# Patient Record
Sex: FEMALE | Race: WHITE | NOT HISPANIC OR LATINO | Employment: OTHER | URBAN - METROPOLITAN AREA
[De-identification: names, ages, dates, MRNs, and addresses within clinical notes are randomized per-mention and may not be internally consistent; named-entity substitution may affect disease eponyms.]

---

## 2019-02-26 ENCOUNTER — LAB REQUISITION (OUTPATIENT)
Dept: LAB | Facility: HOSPITAL | Age: 84
End: 2019-02-26

## 2019-02-26 DIAGNOSIS — Z00.00 ROUTINE GENERAL MEDICAL EXAMINATION AT A HEALTH CARE FACILITY: ICD-10-CM

## 2019-02-26 LAB
ANION GAP SERPL CALCULATED.3IONS-SCNC: 8 MMOL/L (ref 3–11)
BACTERIA UR QL AUTO: ABNORMAL /HPF
BASOPHILS # BLD AUTO: 0.01 10*3/MM3 (ref 0–0.2)
BASOPHILS NFR BLD AUTO: 0.1 % (ref 0–1)
BILIRUB UR QL STRIP: NEGATIVE
BUN BLD-MCNC: 33 MG/DL (ref 9–23)
BUN/CREAT SERPL: 44.6 (ref 7–25)
CALCIUM SPEC-SCNC: 8.9 MG/DL (ref 8.7–10.4)
CHLORIDE SERPL-SCNC: 97 MMOL/L (ref 99–109)
CLARITY UR: CLEAR
CO2 SERPL-SCNC: 26 MMOL/L (ref 20–31)
COLOR UR: YELLOW
CREAT BLD-MCNC: 0.74 MG/DL (ref 0.6–1.3)
DEPRECATED RDW RBC AUTO: 43.4 FL (ref 37–54)
EOSINOPHIL # BLD AUTO: 0.03 10*3/MM3 (ref 0–0.3)
EOSINOPHIL NFR BLD AUTO: 0.2 % (ref 0–3)
ERYTHROCYTE [DISTWIDTH] IN BLOOD BY AUTOMATED COUNT: 12.8 % (ref 11.3–14.5)
GFR SERPL CREATININE-BSD FRML MDRD: 73 ML/MIN/1.73
GFR SERPL CREATININE-BSD FRML MDRD: 89 ML/MIN/1.73
GLUCOSE BLD-MCNC: 135 MG/DL (ref 70–100)
GLUCOSE UR STRIP-MCNC: NEGATIVE MG/DL
HCT VFR BLD AUTO: 38.8 % (ref 34.5–44)
HGB BLD-MCNC: 12.7 G/DL (ref 11.5–15.5)
HGB UR QL STRIP.AUTO: NEGATIVE
HYALINE CASTS UR QL AUTO: ABNORMAL /LPF
IMM GRANULOCYTES # BLD AUTO: 0.08 10*3/MM3 (ref 0–0.05)
IMM GRANULOCYTES NFR BLD AUTO: 0.4 % (ref 0–0.6)
KETONES UR QL STRIP: NEGATIVE
LEUKOCYTE ESTERASE UR QL STRIP.AUTO: ABNORMAL
LYMPHOCYTES # BLD AUTO: 0.89 10*3/MM3 (ref 0.6–4.8)
LYMPHOCYTES NFR BLD AUTO: 4.5 % (ref 24–44)
MCH RBC QN AUTO: 30.5 PG (ref 27–31)
MCHC RBC AUTO-ENTMCNC: 32.7 G/DL (ref 32–36)
MCV RBC AUTO: 93.3 FL (ref 80–99)
MONOCYTES # BLD AUTO: 1.19 10*3/MM3 (ref 0–1)
MONOCYTES NFR BLD AUTO: 6 % (ref 0–12)
NEUTROPHILS # BLD AUTO: 17.55 10*3/MM3 (ref 1.5–8.3)
NEUTROPHILS NFR BLD AUTO: 89.2 % (ref 41–71)
NITRITE UR QL STRIP: NEGATIVE
PH UR STRIP.AUTO: 7 [PH] (ref 5–8)
PLATELET # BLD AUTO: 239 10*3/MM3 (ref 150–450)
PMV BLD AUTO: 9.8 FL (ref 6–12)
POTASSIUM BLD-SCNC: 3.8 MMOL/L (ref 3.5–5.5)
PROT UR QL STRIP: ABNORMAL
RBC # BLD AUTO: 4.16 10*6/MM3 (ref 3.89–5.14)
RBC # UR: ABNORMAL /HPF
REF LAB TEST METHOD: ABNORMAL
SODIUM BLD-SCNC: 131 MMOL/L (ref 132–146)
SP GR UR STRIP: 1.01 (ref 1–1.03)
SQUAMOUS #/AREA URNS HPF: ABNORMAL /HPF
UROBILINOGEN UR QL STRIP: ABNORMAL
WBC NRBC COR # BLD: 19.67 10*3/MM3 (ref 3.5–10.8)
WBC UR QL AUTO: ABNORMAL /HPF
YEAST URNS QL MICRO: ABNORMAL /HPF

## 2019-02-26 PROCEDURE — 85025 COMPLETE CBC W/AUTO DIFF WBC: CPT

## 2019-02-26 PROCEDURE — 81001 URINALYSIS AUTO W/SCOPE: CPT

## 2019-02-26 PROCEDURE — 80048 BASIC METABOLIC PNL TOTAL CA: CPT

## 2019-02-27 ENCOUNTER — HOSPITAL ENCOUNTER (EMERGENCY)
Facility: HOSPITAL | Age: 84
Discharge: NURSING, REHAB OR LONG-TERM CARE | End: 2019-02-27
Attending: EMERGENCY MEDICINE | Admitting: NURSE PRACTITIONER

## 2019-02-27 ENCOUNTER — APPOINTMENT (OUTPATIENT)
Dept: GENERAL RADIOLOGY | Facility: HOSPITAL | Age: 84
End: 2019-02-27

## 2019-02-27 VITALS
DIASTOLIC BLOOD PRESSURE: 78 MMHG | HEART RATE: 77 BPM | TEMPERATURE: 99.1 F | HEIGHT: 65 IN | RESPIRATION RATE: 16 BRPM | SYSTOLIC BLOOD PRESSURE: 171 MMHG | BODY MASS INDEX: 19.99 KG/M2 | WEIGHT: 120 LBS | OXYGEN SATURATION: 91 %

## 2019-02-27 DIAGNOSIS — R11.0 NAUSEA: ICD-10-CM

## 2019-02-27 DIAGNOSIS — N30.00 ACUTE CYSTITIS WITHOUT HEMATURIA: Primary | ICD-10-CM

## 2019-02-27 LAB
ALBUMIN SERPL-MCNC: 3.5 G/DL (ref 3.2–4.8)
ALBUMIN/GLOB SERPL: 1.1 G/DL (ref 1.5–2.5)
ALP SERPL-CCNC: 93 U/L (ref 25–100)
ALT SERPL W P-5'-P-CCNC: 27 U/L (ref 7–40)
ANION GAP SERPL CALCULATED.3IONS-SCNC: 6 MMOL/L (ref 3–11)
AST SERPL-CCNC: 22 U/L (ref 0–33)
BACTERIA UR QL AUTO: ABNORMAL /HPF
BASOPHILS # BLD AUTO: 0.01 10*3/MM3 (ref 0–0.2)
BASOPHILS NFR BLD AUTO: 0.1 % (ref 0–1)
BILIRUB SERPL-MCNC: 0.5 MG/DL (ref 0.3–1.2)
BILIRUB UR QL STRIP: NEGATIVE
BUN BLD-MCNC: 25 MG/DL (ref 9–23)
BUN/CREAT SERPL: 35.2 (ref 7–25)
CALCIUM SPEC-SCNC: 9.1 MG/DL (ref 8.7–10.4)
CHLORIDE SERPL-SCNC: 99 MMOL/L (ref 99–109)
CLARITY UR: ABNORMAL
CO2 SERPL-SCNC: 27 MMOL/L (ref 20–31)
COLOR UR: YELLOW
CREAT BLD-MCNC: 0.71 MG/DL (ref 0.6–1.3)
D-LACTATE SERPL-SCNC: 1.2 MMOL/L (ref 0.5–2)
DEPRECATED RDW RBC AUTO: 42.9 FL (ref 37–54)
EOSINOPHIL # BLD AUTO: 0.06 10*3/MM3 (ref 0–0.3)
EOSINOPHIL NFR BLD AUTO: 0.4 % (ref 0–3)
ERYTHROCYTE [DISTWIDTH] IN BLOOD BY AUTOMATED COUNT: 12.7 % (ref 11.3–14.5)
GFR SERPL CREATININE-BSD FRML MDRD: 77 ML/MIN/1.73
GLOBULIN UR ELPH-MCNC: 3.1 GM/DL
GLUCOSE BLD-MCNC: 120 MG/DL (ref 70–100)
GLUCOSE UR STRIP-MCNC: NEGATIVE MG/DL
HCT VFR BLD AUTO: 35.9 % (ref 34.5–44)
HGB BLD-MCNC: 11.8 G/DL (ref 11.5–15.5)
HGB UR QL STRIP.AUTO: NEGATIVE
HOLD SPECIMEN: NORMAL
HOLD SPECIMEN: NORMAL
HYALINE CASTS UR QL AUTO: ABNORMAL /LPF
IMM GRANULOCYTES # BLD AUTO: 0.06 10*3/MM3 (ref 0–0.05)
IMM GRANULOCYTES NFR BLD AUTO: 0.4 % (ref 0–0.6)
KETONES UR QL STRIP: NEGATIVE
LEUKOCYTE ESTERASE UR QL STRIP.AUTO: ABNORMAL
LIPASE SERPL-CCNC: 39 U/L (ref 6–51)
LYMPHOCYTES # BLD AUTO: 0.83 10*3/MM3 (ref 0.6–4.8)
LYMPHOCYTES NFR BLD AUTO: 5 % (ref 24–44)
MCH RBC QN AUTO: 30.6 PG (ref 27–31)
MCHC RBC AUTO-ENTMCNC: 32.9 G/DL (ref 32–36)
MCV RBC AUTO: 93 FL (ref 80–99)
MONOCYTES # BLD AUTO: 1.15 10*3/MM3 (ref 0–1)
MONOCYTES NFR BLD AUTO: 7 % (ref 0–12)
NEUTROPHILS # BLD AUTO: 14.44 10*3/MM3 (ref 1.5–8.3)
NEUTROPHILS NFR BLD AUTO: 87.5 % (ref 41–71)
NITRITE UR QL STRIP: NEGATIVE
PH UR STRIP.AUTO: 7 [PH] (ref 5–8)
PLATELET # BLD AUTO: 240 10*3/MM3 (ref 150–450)
PMV BLD AUTO: 9.3 FL (ref 6–12)
POTASSIUM BLD-SCNC: 3.4 MMOL/L (ref 3.5–5.5)
PROT SERPL-MCNC: 6.6 G/DL (ref 5.7–8.2)
PROT UR QL STRIP: ABNORMAL
RBC # BLD AUTO: 3.86 10*6/MM3 (ref 3.89–5.14)
RBC # UR: ABNORMAL /HPF
REF LAB TEST METHOD: ABNORMAL
SODIUM BLD-SCNC: 132 MMOL/L (ref 132–146)
SP GR UR STRIP: 1.02 (ref 1–1.03)
SQUAMOUS #/AREA URNS HPF: ABNORMAL /HPF
UROBILINOGEN UR QL STRIP: ABNORMAL
WBC NRBC COR # BLD: 16.49 10*3/MM3 (ref 3.5–10.8)
WBC UR QL AUTO: ABNORMAL /HPF
WHOLE BLOOD HOLD SPECIMEN: NORMAL
WHOLE BLOOD HOLD SPECIMEN: NORMAL

## 2019-02-27 PROCEDURE — 83690 ASSAY OF LIPASE: CPT | Performed by: EMERGENCY MEDICINE

## 2019-02-27 PROCEDURE — 87086 URINE CULTURE/COLONY COUNT: CPT | Performed by: NURSE PRACTITIONER

## 2019-02-27 PROCEDURE — 80053 COMPREHEN METABOLIC PANEL: CPT | Performed by: EMERGENCY MEDICINE

## 2019-02-27 PROCEDURE — 85025 COMPLETE CBC W/AUTO DIFF WBC: CPT | Performed by: EMERGENCY MEDICINE

## 2019-02-27 PROCEDURE — 81001 URINALYSIS AUTO W/SCOPE: CPT | Performed by: EMERGENCY MEDICINE

## 2019-02-27 PROCEDURE — 25010000002 CEFTRIAXONE PER 250 MG: Performed by: NURSE PRACTITIONER

## 2019-02-27 PROCEDURE — 99284 EMERGENCY DEPT VISIT MOD MDM: CPT

## 2019-02-27 PROCEDURE — 96365 THER/PROPH/DIAG IV INF INIT: CPT

## 2019-02-27 PROCEDURE — 87147 CULTURE TYPE IMMUNOLOGIC: CPT | Performed by: NURSE PRACTITIONER

## 2019-02-27 PROCEDURE — 83605 ASSAY OF LACTIC ACID: CPT | Performed by: EMERGENCY MEDICINE

## 2019-02-27 PROCEDURE — 96361 HYDRATE IV INFUSION ADD-ON: CPT

## 2019-02-27 PROCEDURE — 71045 X-RAY EXAM CHEST 1 VIEW: CPT

## 2019-02-27 RX ORDER — ACETAMINOPHEN 325 MG/1
650 TABLET ORAL 2 TIMES DAILY
COMMUNITY

## 2019-02-27 RX ORDER — HYDROCHLOROTHIAZIDE 12.5 MG/1
12.5 TABLET ORAL DAILY
COMMUNITY
End: 2019-04-11 | Stop reason: HOSPADM

## 2019-02-27 RX ORDER — GABAPENTIN 100 MG/1
100 CAPSULE ORAL 2 TIMES DAILY
Status: ON HOLD | COMMUNITY
End: 2019-04-11 | Stop reason: SDUPTHER

## 2019-02-27 RX ORDER — ONDANSETRON 4 MG/1
4 TABLET, FILM COATED ORAL EVERY 6 HOURS PRN
COMMUNITY

## 2019-02-27 RX ORDER — PROMETHAZINE HYDROCHLORIDE 25 MG/1
25 SUPPOSITORY RECTAL EVERY 6 HOURS PRN
COMMUNITY

## 2019-02-27 RX ORDER — RANITIDINE 150 MG/1
150 TABLET ORAL DAILY
COMMUNITY

## 2019-02-27 RX ORDER — SODIUM CHLORIDE 0.9 % (FLUSH) 0.9 %
10 SYRINGE (ML) INJECTION AS NEEDED
Status: DISCONTINUED | OUTPATIENT
Start: 2019-02-27 | End: 2019-02-27 | Stop reason: HOSPADM

## 2019-02-27 RX ORDER — CEFDINIR 300 MG/1
300 CAPSULE ORAL 2 TIMES DAILY
Qty: 20 CAPSULE | Refills: 0 | Status: ON HOLD | OUTPATIENT
Start: 2019-02-27 | End: 2019-04-05

## 2019-02-27 RX ORDER — DOCUSATE SODIUM 100 MG/1
100 CAPSULE, LIQUID FILLED ORAL 2 TIMES DAILY
COMMUNITY

## 2019-02-27 RX ORDER — ALENDRONATE SODIUM 70 MG/1
70 TABLET ORAL
COMMUNITY

## 2019-02-27 RX ORDER — LORATADINE 10 MG/1
10 TABLET ORAL DAILY
COMMUNITY

## 2019-02-27 RX ORDER — ASPIRIN 81 MG/1
81 TABLET ORAL DAILY
COMMUNITY

## 2019-02-27 RX ORDER — CALCIUM CARBONATE 500(1250)
1000 TABLET ORAL DAILY
Status: ON HOLD | COMMUNITY
End: 2019-04-05

## 2019-02-27 RX ORDER — ROPINIROLE 0.5 MG/1
0.5 TABLET, FILM COATED ORAL NIGHTLY
COMMUNITY

## 2019-02-27 RX ORDER — AMLODIPINE BESYLATE 5 MG/1
5 TABLET ORAL 2 TIMES DAILY
COMMUNITY
End: 2019-04-11 | Stop reason: HOSPADM

## 2019-02-27 RX ORDER — VENLAFAXINE 37.5 MG/1
37.5 TABLET ORAL 2 TIMES DAILY
COMMUNITY

## 2019-02-27 RX ORDER — POTASSIUM CHLORIDE 1500 MG/1
20 TABLET, FILM COATED, EXTENDED RELEASE ORAL DAILY
COMMUNITY
End: 2019-04-11 | Stop reason: HOSPADM

## 2019-02-27 RX ORDER — CEFTRIAXONE SODIUM 1 G/50ML
1 INJECTION, SOLUTION INTRAVENOUS ONCE
Status: COMPLETED | OUTPATIENT
Start: 2019-02-27 | End: 2019-02-27

## 2019-02-27 RX ADMIN — CEFTRIAXONE SODIUM 1 G: 1 INJECTION, SOLUTION INTRAVENOUS at 14:07

## 2019-02-27 RX ADMIN — SODIUM CHLORIDE 1000 ML: 9 INJECTION, SOLUTION INTRAVENOUS at 12:56

## 2019-03-01 LAB
BACTERIA SPEC AEROBE CULT: ABNORMAL
BACTERIA SPEC AEROBE CULT: ABNORMAL
STREP GROUPING: ABNORMAL

## 2019-04-05 ENCOUNTER — HOSPITAL ENCOUNTER (INPATIENT)
Facility: HOSPITAL | Age: 84
LOS: 6 days | Discharge: NURSING, REHAB OR LONG-TERM CARE | End: 2019-04-11
Attending: EMERGENCY MEDICINE | Admitting: INTERNAL MEDICINE

## 2019-04-05 ENCOUNTER — APPOINTMENT (OUTPATIENT)
Dept: GENERAL RADIOLOGY | Facility: HOSPITAL | Age: 84
End: 2019-04-05

## 2019-04-05 DIAGNOSIS — R41.82 ALTERED MENTAL STATUS, UNSPECIFIED ALTERED MENTAL STATUS TYPE: ICD-10-CM

## 2019-04-05 DIAGNOSIS — R74.02 ELEVATED SERUM LACTATE DEHYDROGENASE: ICD-10-CM

## 2019-04-05 DIAGNOSIS — R13.12 OROPHARYNGEAL DYSPHAGIA: ICD-10-CM

## 2019-04-05 DIAGNOSIS — Z78.9 IMPAIRED MOBILITY AND ADLS: ICD-10-CM

## 2019-04-05 DIAGNOSIS — Z74.09 IMPAIRED FUNCTIONAL MOBILITY, BALANCE, GAIT, AND ENDURANCE: ICD-10-CM

## 2019-04-05 DIAGNOSIS — N28.9 RENAL INSUFFICIENCY: ICD-10-CM

## 2019-04-05 DIAGNOSIS — A41.9 SEPSIS DUE TO URINARY TRACT INFECTION (HCC): Primary | ICD-10-CM

## 2019-04-05 DIAGNOSIS — N39.0 SEPSIS DUE TO URINARY TRACT INFECTION (HCC): Primary | ICD-10-CM

## 2019-04-05 DIAGNOSIS — E87.3 RESPIRATORY ALKALOSIS: ICD-10-CM

## 2019-04-05 DIAGNOSIS — Z74.09 IMPAIRED MOBILITY AND ADLS: ICD-10-CM

## 2019-04-05 DIAGNOSIS — E86.0 DEHYDRATION: ICD-10-CM

## 2019-04-05 PROBLEM — R74.01 TRANSAMINITIS: Status: ACTIVE | Noted: 2019-04-05

## 2019-04-05 PROBLEM — I10 HYPERTENSION: Status: ACTIVE | Noted: 2019-04-05

## 2019-04-05 PROBLEM — E11.9 DIABETES MELLITUS (HCC): Status: ACTIVE | Noted: 2019-04-05

## 2019-04-05 PROBLEM — F32.A DEPRESSION: Status: ACTIVE | Noted: 2019-04-05

## 2019-04-05 PROBLEM — E87.20 LACTIC ACIDOSIS: Status: ACTIVE | Noted: 2019-04-05

## 2019-04-05 PROBLEM — N17.9 AKI (ACUTE KIDNEY INJURY) (HCC): Status: ACTIVE | Noted: 2019-04-05

## 2019-04-05 PROBLEM — F03.90 DEMENTIA (HCC): Status: ACTIVE | Noted: 2019-04-05

## 2019-04-05 LAB
ALBUMIN SERPL-MCNC: 4.41 G/DL (ref 3.2–4.8)
ALBUMIN/GLOB SERPL: 1 G/DL (ref 1.5–2.5)
ALP SERPL-CCNC: 134 U/L (ref 25–100)
ALT SERPL W P-5'-P-CCNC: 86 U/L (ref 7–40)
AMPHET+METHAMPHET UR QL: NEGATIVE
AMPHETAMINES UR QL: NEGATIVE
ANION GAP SERPL CALCULATED.3IONS-SCNC: 12 MMOL/L (ref 3–11)
ARTERIAL PATENCY WRIST A: ABNORMAL
AST SERPL-CCNC: 66 U/L (ref 0–33)
ATMOSPHERIC PRESS: ABNORMAL MMHG
BACTERIA UR QL AUTO: ABNORMAL /HPF
BARBITURATES UR QL SCN: NEGATIVE
BASE EXCESS BLDA CALC-SCNC: 2.5 MMOL/L (ref 0–2)
BASOPHILS # BLD AUTO: 0.02 10*3/MM3 (ref 0–0.2)
BASOPHILS NFR BLD AUTO: 0.2 % (ref 0–1)
BDY SITE: ABNORMAL
BENZODIAZ UR QL SCN: NEGATIVE
BILIRUB SERPL-MCNC: 0.5 MG/DL (ref 0.3–1.2)
BILIRUB UR QL STRIP: NEGATIVE
BODY TEMPERATURE: 37 C
BUN BLD-MCNC: 39 MG/DL (ref 9–23)
BUN/CREAT SERPL: 28.9 (ref 7–25)
BUPRENORPHINE SERPL-MCNC: NEGATIVE NG/ML
CALCIUM SPEC-SCNC: 10.8 MG/DL (ref 8.7–10.4)
CANNABINOIDS SERPL QL: NEGATIVE
CHLORIDE SERPL-SCNC: 111 MMOL/L (ref 99–109)
CLARITY UR: ABNORMAL
CO2 BLDA-SCNC: 25.1 MMOL/L (ref 23–27)
CO2 SERPL-SCNC: 24 MMOL/L (ref 20–31)
COCAINE UR QL: NEGATIVE
COHGB MFR BLD: 1.5 % (ref 0–2)
COLOR UR: ABNORMAL
CREAT BLD-MCNC: 1.35 MG/DL (ref 0.6–1.3)
D-LACTATE SERPL-SCNC: 1.6 MMOL/L (ref 0.5–2)
D-LACTATE SERPL-SCNC: 3.1 MMOL/L (ref 0.5–2)
DEPRECATED RDW RBC AUTO: 48.8 FL (ref 37–54)
EOSINOPHIL # BLD AUTO: 0 10*3/MM3 (ref 0–0.3)
EOSINOPHIL NFR BLD AUTO: 0 % (ref 0–3)
ERYTHROCYTE [DISTWIDTH] IN BLOOD BY AUTOMATED COUNT: 14.3 % (ref 11.3–14.5)
GFR SERPL CREATININE-BSD FRML MDRD: 37 ML/MIN/1.73
GLOBULIN UR ELPH-MCNC: 4.6 GM/DL
GLUCOSE BLD-MCNC: 177 MG/DL (ref 70–100)
GLUCOSE UR STRIP-MCNC: NEGATIVE MG/DL
HCO3 BLDA-SCNC: 24.2 MMOL/L (ref 20–26)
HCT VFR BLD AUTO: 51.8 % (ref 34.5–44)
HCT VFR BLD CALC: 46 %
HGB BLD-MCNC: 16.6 G/DL (ref 11.5–15.5)
HGB BLDA-MCNC: 15 G/DL (ref 14–18)
HGB UR QL STRIP.AUTO: ABNORMAL
HOLD SPECIMEN: NORMAL
HOROWITZ INDEX BLD+IHG-RTO: 21 %
HYALINE CASTS UR QL AUTO: ABNORMAL /LPF
IMM GRANULOCYTES # BLD AUTO: 0.04 10*3/MM3 (ref 0–0.05)
IMM GRANULOCYTES NFR BLD AUTO: 0.3 % (ref 0–0.6)
KETONES UR QL STRIP: ABNORMAL
LEUKOCYTE ESTERASE UR QL STRIP.AUTO: ABNORMAL
LYMPHOCYTES # BLD AUTO: 1.22 10*3/MM3 (ref 0.6–4.8)
LYMPHOCYTES NFR BLD AUTO: 10 % (ref 24–44)
MCH RBC QN AUTO: 30.4 PG (ref 27–31)
MCHC RBC AUTO-ENTMCNC: 32 G/DL (ref 32–36)
MCV RBC AUTO: 94.9 FL (ref 80–99)
METHADONE UR QL SCN: NEGATIVE
METHGB BLD QL: 0.7 % (ref 0–1.5)
MODALITY: ABNORMAL
MONOCYTES # BLD AUTO: 0.47 10*3/MM3 (ref 0–1)
MONOCYTES NFR BLD AUTO: 3.9 % (ref 0–12)
NEUTROPHILS # BLD AUTO: 10.49 10*3/MM3 (ref 1.5–8.3)
NEUTROPHILS NFR BLD AUTO: 85.9 % (ref 41–71)
NITRITE UR QL STRIP: NEGATIVE
NOTE: ABNORMAL
OPIATES UR QL: NEGATIVE
OXYCODONE UR QL SCN: NEGATIVE
OXYHGB MFR BLDV: 94.9 % (ref 94–99)
PCO2 BLDA: 29.2 MM HG (ref 35–45)
PCO2 TEMP ADJ BLD: 29.2 MM HG (ref 35–45)
PCP UR QL SCN: NEGATIVE
PH BLDA: 7.53 PH UNITS (ref 7.35–7.45)
PH UR STRIP.AUTO: 8 [PH] (ref 5–8)
PH, TEMP CORRECTED: 7.53 PH UNITS
PLATELET # BLD AUTO: 361 10*3/MM3 (ref 150–450)
PMV BLD AUTO: 9.9 FL (ref 6–12)
PO2 BLDA: 76.2 MM HG (ref 83–108)
PO2 TEMP ADJ BLD: 76.2 MM HG (ref 83–108)
POTASSIUM BLD-SCNC: 3.9 MMOL/L (ref 3.5–5.5)
PROPOXYPH UR QL: NEGATIVE
PROT SERPL-MCNC: 9 G/DL (ref 5.7–8.2)
PROT UR QL STRIP: ABNORMAL
RBC # BLD AUTO: 5.46 10*6/MM3 (ref 3.89–5.14)
RBC # UR: ABNORMAL /HPF
REF LAB TEST METHOD: ABNORMAL
SODIUM BLD-SCNC: 147 MMOL/L (ref 132–146)
SP GR UR STRIP: 1.02 (ref 1–1.03)
SQUAMOUS #/AREA URNS HPF: ABNORMAL /HPF
TRICYCLICS UR QL SCN: NEGATIVE
UROBILINOGEN UR QL STRIP: ABNORMAL
VENTILATOR MODE: ABNORMAL
WBC NRBC COR # BLD: 12.2 10*3/MM3 (ref 3.5–10.8)
WBC UR QL AUTO: ABNORMAL /HPF
WHOLE BLOOD HOLD SPECIMEN: NORMAL
WHOLE BLOOD HOLD SPECIMEN: NORMAL

## 2019-04-05 PROCEDURE — 81001 URINALYSIS AUTO W/SCOPE: CPT | Performed by: EMERGENCY MEDICINE

## 2019-04-05 PROCEDURE — 80053 COMPREHEN METABOLIC PANEL: CPT | Performed by: EMERGENCY MEDICINE

## 2019-04-05 PROCEDURE — 85025 COMPLETE CBC W/AUTO DIFF WBC: CPT | Performed by: EMERGENCY MEDICINE

## 2019-04-05 PROCEDURE — 25010000002 VANCOMYCIN PER 500 MG

## 2019-04-05 PROCEDURE — 87040 BLOOD CULTURE FOR BACTERIA: CPT | Performed by: EMERGENCY MEDICINE

## 2019-04-05 PROCEDURE — 80306 DRUG TEST PRSMV INSTRMNT: CPT | Performed by: EMERGENCY MEDICINE

## 2019-04-05 PROCEDURE — 93010 ELECTROCARDIOGRAM REPORT: CPT | Performed by: INTERNAL MEDICINE

## 2019-04-05 PROCEDURE — 71045 X-RAY EXAM CHEST 1 VIEW: CPT

## 2019-04-05 PROCEDURE — 83605 ASSAY OF LACTIC ACID: CPT | Performed by: EMERGENCY MEDICINE

## 2019-04-05 PROCEDURE — 87086 URINE CULTURE/COLONY COUNT: CPT | Performed by: EMERGENCY MEDICINE

## 2019-04-05 PROCEDURE — 25010000002 CEFEPIME 2 G/NS 100 ML SOLUTION: Performed by: INTERNAL MEDICINE

## 2019-04-05 PROCEDURE — 36600 WITHDRAWAL OF ARTERIAL BLOOD: CPT

## 2019-04-05 PROCEDURE — 25010000003 CEFTRIAXONE PER 250 MG: Performed by: EMERGENCY MEDICINE

## 2019-04-05 PROCEDURE — 99291 CRITICAL CARE FIRST HOUR: CPT

## 2019-04-05 PROCEDURE — 87077 CULTURE AEROBIC IDENTIFY: CPT | Performed by: EMERGENCY MEDICINE

## 2019-04-05 PROCEDURE — 93005 ELECTROCARDIOGRAM TRACING: CPT | Performed by: INTERNAL MEDICINE

## 2019-04-05 PROCEDURE — 99291 CRITICAL CARE FIRST HOUR: CPT | Performed by: INTERNAL MEDICINE

## 2019-04-05 PROCEDURE — 82805 BLOOD GASES W/O2 SATURATION: CPT

## 2019-04-05 PROCEDURE — 92610 EVALUATE SWALLOWING FUNCTION: CPT

## 2019-04-05 PROCEDURE — 25010000002 LINEZOLID 600 MG/300ML SOLUTION: Performed by: INTERNAL MEDICINE

## 2019-04-05 PROCEDURE — 87186 SC STD MICRODIL/AGAR DIL: CPT | Performed by: EMERGENCY MEDICINE

## 2019-04-05 RX ORDER — LINEZOLID 2 MG/ML
600 INJECTION, SOLUTION INTRAVENOUS EVERY 12 HOURS
Status: DISCONTINUED | OUTPATIENT
Start: 2019-04-05 | End: 2019-04-06

## 2019-04-05 RX ORDER — NYSTATIN 100000 [USP'U]/G
1 POWDER TOPICAL 2 TIMES DAILY
COMMUNITY

## 2019-04-05 RX ORDER — CEFTRIAXONE SODIUM 2 G/50ML
2 INJECTION, SOLUTION INTRAVENOUS ONCE
Status: COMPLETED | OUTPATIENT
Start: 2019-04-05 | End: 2019-04-05

## 2019-04-05 RX ORDER — SODIUM CHLORIDE 0.9 % (FLUSH) 0.9 %
10 SYRINGE (ML) INJECTION AS NEEDED
Status: DISCONTINUED | OUTPATIENT
Start: 2019-04-05 | End: 2019-04-11 | Stop reason: HOSPADM

## 2019-04-05 RX ORDER — SODIUM CHLORIDE 9 MG/ML
50 INJECTION, SOLUTION INTRAVENOUS CONTINUOUS
Status: DISCONTINUED | OUTPATIENT
Start: 2019-04-05 | End: 2019-04-11 | Stop reason: HOSPADM

## 2019-04-05 RX ORDER — VANCOMYCIN HYDROCHLORIDE 1 G/200ML
1000 INJECTION, SOLUTION INTRAVENOUS ONCE
Status: COMPLETED | OUTPATIENT
Start: 2019-04-05 | End: 2019-04-05

## 2019-04-05 RX ADMIN — CEFTRIAXONE SODIUM 2 G: 2 INJECTION, SOLUTION INTRAVENOUS at 10:06

## 2019-04-05 RX ADMIN — VANCOMYCIN HYDROCHLORIDE 1000 MG: 1 INJECTION, SOLUTION INTRAVENOUS at 11:39

## 2019-04-05 RX ADMIN — SODIUM CHLORIDE 125 ML/HR: 900 INJECTION INTRAVENOUS at 09:23

## 2019-04-05 RX ADMIN — NICARDIPINE HYDROCHLORIDE 5 MG/HR: 0.1 INJECTION, SOLUTION INTRAVENOUS at 13:22

## 2019-04-05 RX ADMIN — CEFEPIME 2 G: 2 INJECTION, POWDER, FOR SOLUTION INTRAVENOUS at 16:11

## 2019-04-05 RX ADMIN — LINEZOLID 600 MG: 600 INJECTION, SOLUTION INTRAVENOUS at 14:46

## 2019-04-05 RX ADMIN — SODIUM CHLORIDE 1632 ML: 9 INJECTION, SOLUTION INTRAVENOUS at 09:23

## 2019-04-05 NOTE — H&P
INTENSIVIST   INITIAL HOSPITAL VISIT NOTE     Hospital:  LOS: 0 days     Ms. Roro Guzman, 89 y.o. female is seen for a Chief Complaint of:  Chief Complaint   Patient presents with   • Altered Mental Status       Sepsis due to urinary tract infection (CMS/[facility])    Hypertension    Diabetes mellitus (CMS/[facility])    Depression    Dementia    TAMI (acute kidney injury) (CMS/[facility])    Lactic acidosis    Transaminitis      Subjective   S     Ms. Guzman is a 89yo F Nursing Home Resident with a history of dementia and recurrent UTI who presented to the ED from [facility] for evaluation of altered mental status. Her son and POA are at bedside. Her son notes that for the past 1 week she has been less responsive and has been unable to feed herself. She developed a fever last night and her mentation worsened prompting transfer to the ED.     Upon arrival to the ED, she was noted to be poorly responsive with periods of apnea. Urinalysis was indicative of a UTI and she was given 1 dose of Rocephin.     Her POA and son both note that over the past several months, she has become weaker and cannot get around unassisted which is a change for her.        PMH: She  has a past medical history of Anemia, Clostridium difficile infection, Dementia, Depression, Diabetes mellitus (CMS/[facility]), GERD (gastroesophageal reflux disease), Hemorrhoid, Hypertension, and PVD (peripheral vascular disease) (CMS/[facility]).   PSxH: She  has no past surgical history on file.      Medications:  No current facility-administered medications on file prior to encounter.      Current Outpatient Medications on File Prior to Encounter   Medication Sig   • acetaminophen (TYLENOL) 325 MG tablet Take 650 mg by mouth 2 (Two) Times a Day.   • alendronate (FOSAMAX) 70 MG tablet Take 70 mg by mouth Every 7 (Seven) Days.   • amLODIPine (NORVASC) 5 MG tablet Take 5 mg by mouth 2 (Two) Times a Day.   • Artificial Tear Solution (BION TEARS OP) Apply 1 drop to eye(s) as directed by  provider 2 (Two) Times a Day.   • aspirin 81 MG EC tablet Take 81 mg by mouth Daily.   • calcium carbonate, oyster shell, 500 MG tablet tablet Take 1,000 mg by mouth Daily.   • docusate sodium (COLACE) 100 MG capsule Take 100 mg by mouth 2 (Two) Times a Day.   • gabapentin (NEURONTIN) 100 MG capsule Take 100 mg by mouth 2 (Two) Times a Day.   • hydrochlorothiazide (HYDRODIURIL) 12.5 MG tablet Take 12.5 mg by mouth Daily.   • loratadine (CLARITIN) 10 MG tablet Take 10 mg by mouth Daily.   • ondansetron (ZOFRAN) 4 MG tablet Take 4 mg by mouth Every 6 (Six) Hours As Needed for Nausea or Vomiting.   • potassium chloride (K-DUR) 10 MEQ CR tablet Take 20 mEq by mouth Daily.   • promethazine (PHENERGAN) 25 MG suppository Insert 25 mg into the rectum Every 6 (Six) Hours As Needed for Nausea or Vomiting.   • raNITIdine (ZANTAC) 150 MG tablet Take 150 mg by mouth Daily.   • rOPINIRole (REQUIP) 0.5 MG tablet Take 0.5 mg by mouth Every Night. Take 1 hour before bedtime.   • venlafaxine (EFFEXOR) 37.5 MG tablet Take 37.5 mg by mouth 2 (Two) Times a Day.   • cefdinir (OMNICEF) 300 MG capsule Take 1 capsule by mouth 2 (Two) Times a Day.       Allergies: She is allergic to ace inhibitors; amoxicillin; and penicillins.   FH: Her family history is not on file.   SH: She  reports that she does not drink alcohol or use drugs.     The patient's relevant past medical, surgical and social history were reviewed and updated in Epic as appropriate.     ROS (14):   Review of Systems   Unable to perform ROS: Mental status change       Objective   O     Vitals    Temp  Min: 102.3 °F (39.1 °C)  Max: 102.3 °F (39.1 °C)  BP  Min: 145/108  Max: 220/107  Pulse  Min: 101  Max: 134  Resp  Min: 24  Max: 24  SpO2  Min: 92 %  Max: 96 % No Data Recorded     I/O 24 hrs (7:00AM - 6:59 AM)  Intake/Output       04/05/19 0700 - 04/06/19 0659    Intake (ml) 3314    Output (ml) --    Net (ml) 3314    Last Weight  54.4 kg (120 lb)          Medications  (drips):    niCARdipine    Pharmacy to dose vancomycin    sodium chloride Last Rate: 125 mL/hr (04/05/19 0923)       Physical Examination    Telemetry: Normal sinus rhythm.    Constitutional:  No acute distress.  Poorly responsive.    HEENT: Normocephalic and atraumatic.   Neck:  Normal range of motion. Neck supple.   No JVD present.   No thyromegaly.    Cardiovascular: Regular rate and rhythm.  No murmurs, rub or gallop.  No peripheral edema.   Respiratory: Normal respiratory effort.  Clear to ascultation.   Abdominal:  Soft. No masses.   Non-tender. No distension.   No hepatosplenomegaly.   MSK: Normal muscle strength and tone.   Extremities: No digital cyanosis or clubbing.   Skin: Skin is warm and dry.  No rashes, lesions or ulcers noted.    Neurological:   Minimal response to painful stimuli.    Psychiatric:  Unable to assess.             Results from last 7 days   Lab Units 04/05/19  0858   WBC 10*3/mm3 12.20*   HEMOGLOBIN g/dL 16.6*   MCV fL 94.9   PLATELETS 10*3/mm3 361     Results from last 7 days   Lab Units 04/05/19  0858   SODIUM mmol/L 147*   POTASSIUM mmol/L 3.9   CO2 mmol/L 24.0   CREATININE mg/dL 1.35*     Estimated Creatinine Clearance: 22.8 mL/min (A) (by C-G formula based on SCr of 1.35 mg/dL (H)).  Results from last 7 days   Lab Units 04/05/19  0858   ALK PHOS U/L 134*   BILIRUBIN mg/dL 0.5   ALT (SGPT) U/L 86*   AST (SGOT) U/L 66*       Results from last 7 days   Lab Units 04/05/19  1009   PH, ARTERIAL pH units 7.527*   PCO2, ARTERIAL mm Hg 29.2*   PO2 ART mm Hg 76.2*   FIO2 % 21       Images:    Imaging Results (last 24 hours)     Procedure Component Value Units Date/Time    XR Chest 1 View [[id number]] Collected:  04/05/19 1143     Updated:  04/05/19 1147    Narrative:       EXAMINATION: XR CHEST 1 VW-04/05/2019:      INDICATION: Pneumonia; A41.9-Sepsis, unspecified organism; N39.0-Urinary  tract infection, site not specified.      COMPARISON: 02/27/2019.     FINDINGS: The cardiac silhouette is  normal. There is a tortuous thoracic  aorta. There is no acute inflammatory process, mass or effusion.           Impression:       Chronic change. There are no acute findings.     D:  04/05/2019  E:  04/05/2019     This report was finalized on 4/5/2019 11:44 AM by Dr. Derek Contreras MD.           ECG/EMG Results (last 24 hours)     ** No results found for the last 24 hours. **          I reviewed the patient's new laboratory and imaging results.  I independently reviewed the patient's new images.    Assessment/Plan   A / P     Ms. Guzman is a 89yo F nursing home resident who presents to the ED with altered mental status and UTI. She is hypertensive and minimally responsive.       Nutrition:   No diet orders on file  Advance Directives:   Code Status and Medical Interventions:   Ordered at: 04/05/19 1104     Limited Support to NOT Include:    Intubation    Cardioversion/Defibrillation     Level Of Support Discussed With:    Health Care Surrogate     Code Status:    No CPR     Medical Interventions (Level of Support Prior to Arrest):    Limited       Active Hospital Problems    Diagnosis   • **Sepsis due to urinary tract infection (CMS/[facility])   • Hypertension   • Diabetes mellitus (CMS/[facility])   • Depression   • Dementia   • TAMI (acute kidney injury) (CMS/[facility])   • Lactic acidosis   • Transaminitis       Assessment / Plan:    1. Admit to ICU  2. Change Rocephin to Linezolid and Cefepime as she has a history of VRE UTI and to cover pseudomonas.   3. Cardene as needed for BP control  4. Monitor renal function.   5. Trend lactate  6. Continue IV fluids  7. NPO until mental status improves.   8. AM labs    I have discussed the patient's current condition with her son and her POA at the bedside. They agree that she should be DNR with no intubation. They would like for her to be full support otherwise. If she does not have any improvement and our interventions are causing her pain, they would then be agreeable to focusing on  comfort measures.       I discussed the patient's findings and my recommendations with family and nursing staff    Time:   Critical Care Time: was greater than 30 minutes.(excluding time spent on other separately billable procedures or treating other patients).        Porsha V Case, DO  Pulmonary and Critical Care Medicine

## 2019-04-05 NOTE — PLAN OF CARE
Problem: Patient Care Overview  Goal: Plan of Care Review  Outcome: Ongoing (interventions implemented as appropriate)   04/05/19 6304   Coping/Psychosocial   Plan of Care Reviewed With patient;son   Plan of Care Review   Progress (eval)   SLP evaluation completed. Will continue to address dysphagia. Please see note for further details and recommendations.

## 2019-04-05 NOTE — THERAPY EVALUATION
Acute Care - Speech Language Pathology   Swallow Initial Evaluation [facility]   Clinical Swallow Evaluation       Patient Name: Roro Guzman  : 1926  MRN: [id number]  Today's Date: 2019               Admit Date: 2019    Visit Dx:     ICD-10-CM ICD-9-CM   1. Sepsis due to urinary tract infection (CMS/HCC) A41.9 038.9    N39.0 995.91     599.0   2. Renal insufficiency N28.9 593.9   3. Dehydration E86.0 276.51   4. Respiratory alkalosis E87.3 276.3   5. Elevated serum lactate dehydrogenase R74.0 790.4   6. Altered mental status, unspecified altered mental status type R41.82 780.97     Patient Active Problem List   Diagnosis   • Sepsis due to urinary tract infection (CMS/HCC)   • Hypertension   • Diabetes mellitus (CMS/HCC)   • Depression   • Dementia   • TAMI (acute kidney injury) (CMS/HCC)   • Lactic acidosis   • Transaminitis     Past Medical History:   Diagnosis Date   • Anemia    • Clostridium difficile infection    • Dementia    • Depression    • Diabetes mellitus (CMS/HCC)    • GERD (gastroesophageal reflux disease)    • Hemorrhoid    • Hypertension    • PVD (peripheral vascular disease) (CMS/HCC)      History reviewed. No pertinent surgical history.     SWALLOW EVALUATION (last 72 hours)      SLP Adult Swallow Evaluation     Row Name 19 1500                   Rehab Evaluation    Document Type  evaluation  -AV        Subjective Information  no complaints  -AV        Patient Observations  alert;cooperative  -AV        Patient/Family Observations  son present   -AV        Patient Effort  good  -AV           General Information    Patient Profile Reviewed  yes  -AV        Pertinent History Of Current Problem  sepsis, cognitive decline recently at [facility]  -AV        Current Method of Nutrition  pureed;nectar/syrup-thick liquids  -AV        Precautions/Limitations, Vision  WFL;for purposes of eval  -AV        Precautions/Limitations, Hearing  WFL;for purposes of eval  -AV        Prior Level of  Function-Communication  cognitive-linguistic impairment  -AV        Prior Level of Function-Swallowing  nectar thick liquids;puree  -AV        Plans/Goals Discussed with  patient;family  -AV        Barriers to Rehab  cognitive status  -AV        Patient's Goals for Discharge  patient could not state  -AV        Family Goals for Discharge  patient able to return to PO diet  -AV           Pain Assessment    Additional Documentation  Pain Scale: FACES Pre/Post-Treatment (Group)  -AV           Pain Scale: FACES Pre/Post-Treatment    Pain: FACES Scale, Pretreatment  0-->no hurt  -AV        Pain: FACES Scale, Post-Treatment  0-->no hurt  -AV           Oral Motor and Function    Dentition Assessment  poor oral hygiene;missing teeth  -AV        Secretion Management  WNL/WFL  -AV        Mucosal Quality  dry  -AV        Volitional Swallow  WFL  -AV        Volitional Cough  WFL  -AV           Oral Musculature and Cranial Nerve Assessment    Oral Motor General Assessment  generalized oral motor weakness  -AV           General Eating/Swallowing Observations    Eating/Swallowing Skills  fed by SLP  -AV        Positioning During Eating  upright 90 degree;upright in bed  -AV        Utensils Used  spoon;cup;straw  -AV        Consistencies Trialed  pureed;thin liquids  -AV           Clinical Swallow Eval    Oral Prep Phase  impaired  -AV        Oral Transit  impaired  -AV        Oral Residue  WFL  -AV        Pharyngeal Phase  no overt signs/symptoms of pharyngeal impairment  -AV        Esophageal Phase  unremarkable  -AV        Clinical Swallow Evaluation Summary  Clinical swallow eval completed this pm:  patient placed on puree and nectar at SNF 2' cog change. Patient more alert today per family. Patient with no overt s/s with puree and thins.  Provided with adaptive cup. Did not trial solids at this time 2' concern for fatigue.  Rec puree and thins at this time.  will follow up with diet tolerance   -AV           Clinical  Impression    SLP Swallowing Diagnosis  suspected pharyngeal dysfunction  -AV        Functional Impact  risk of aspiration/pneumonia  -AV        Rehab Potential/Prognosis, Swallowing  adequate, monitor progress closely  -AV        Swallow Criteria for Skilled Therapeutic Interventions Met  demonstrates skilled criteria  -AV           Recommendations    Therapy Frequency (Swallow)  PRN  -AV        Predicted Duration Therapy Intervention (Days)  until discharge  -AV        SLP Diet Recommendation  puree;thin liquids  -AV        Recommended Precautions and Strategies  upright posture during/after eating;small bites of food and sips of liquid  -AV        SLP Rec. for Method of Medication Administration  meds whole;as tolerated  -AV        Monitor for Signs of Aspiration  yes;notify SLP if any concerns  -AV        Anticipated Dischage Disposition  [facility] nursing [facility]  -AV          User Key  (r) = Recorded By, (t) = Taken By, (c) = Cosigned By    Initials Name Effective Dates    AV Giovanna Rojas, MS CCC-SLP 04/03/18 -           EDUCATION  The patient has been educated in the following areas:   Dysphagia (Swallowing Impairment) Oral Care/Hydration.    SLP Recommendation and Plan  SLP Swallowing Diagnosis: suspected pharyngeal dysfunction  SLP Diet Recommendation: puree, thin liquids  Recommended Precautions and Strategies: upright posture during/after eating, small bites of food and sips of liquid     Monitor for Signs of Aspiration: yes, notify SLP if any concerns     Swallow Criteria for Skilled Therapeutic Interventions Met: demonstrates skilled criteria  Anticipated Dischage Disposition: skilled nursing facility  Rehab Potential/Prognosis, Swallowing: adequate, monitor progress closely  Therapy Frequency (Swallow): PRN  Predicted Duration Therapy Intervention (Days): until discharge       Plan of Care Reviewed With: patient, son  Plan of Care Review  Plan of Care Reviewed With: patient, son  Progress:  (eval)    SLP GOALS     Row Name 04/05/19 1500             Oral Nutrition/Hydration Goal 1 (SLP)    Oral Nutrition/Hydration Goal 1, SLP  LTG:  Patient will tolerate LRD without s/s of pen/asp.   -AV      Time Frame (Oral Nutrition/Hydration Goal 1, SLP)  by discharge  -AV        User Key  (r) = Recorded By, (t) = Taken By, (c) = Cosigned By    Initials Name Provider Type    Giovanna Ferguson, MS CCC-SLP Speech and Language Pathologist             Time Calculation:   Time Calculation- SLP     Row Name 04/05/19 1534             Time Calculation- SLP    SLP Start Time  1430  -AV      SLP Received On  04/05/19  -AV        User Key  (r) = Recorded By, (t) = Taken By, (c) = Cosigned By    Initials Name Provider Type    Giovanna Ferguson, MS CCC-SLP Speech and Language Pathologist          Therapy Charges for Today     Code Description Service Date Service Provider Modifiers Qty    92190011249  ST EVAL ORAL PHARYNG SWALLOW 3 4/5/2019 Giovanna Rojas, MS CCC-SLP GN 1               Giovanna Rojas MS CCC-SLP  4/5/2019

## 2019-04-05 NOTE — PLAN OF CARE
Problem: Skin Injury Risk (Adult)  Goal: Identify Related Risk Factors and Signs and Symptoms  Outcome: Outcome(s) achieved Date Met: 04/05/19    Goal: Skin Health and Integrity  Outcome: Ongoing (interventions implemented as appropriate)      Problem: Fall Risk (Adult)  Goal: Identify Related Risk Factors and Signs and Symptoms  Outcome: Outcome(s) achieved Date Met: 04/05/19    Goal: Absence of Fall  Outcome: Ongoing (interventions implemented as appropriate)      Problem: Patient Care Overview  Goal: Plan of Care Review  Outcome: Ongoing (interventions implemented as appropriate)   04/05/19 3698   Coping/Psychosocial   Plan of Care Reviewed With patient;son   Plan of Care Review   Progress improving   OTHER   Outcome Summary Received pt from ED with dx of urosepsis. IVABX infusing. Pt awake/alert/oriented to person only. Speech consult, puree and nectar thick diet ordered. Cardene gtt infused for a few hours, to keep systolic <180 per MD order.        Problem: Sepsis/Septic Shock (Adult)  Goal: Signs and Symptoms of Listed Potential Problems Will be Absent, Minimized or Managed (Sepsis/Septic Shock)  Outcome: Ongoing (interventions implemented as appropriate)

## 2019-04-05 NOTE — ED PROVIDER NOTES
Subjective   Roro Guzman is a 89 y.o.female with a history of dementia and recurrent urinary tract infections who was brought to the emergency department by EMS from [facility] for evaluation of altered mental status. Staff at the facility report that the patient developed a fever last night with an associated decrease in baseline mental status and responsiveness. Her symptoms had not improved this morning which is when they called EMS for transport. EMS obtained an axillary temperature of 100.4 prior to arrival.        History provided by:  EMS personnel  History limited by:  Dementia and mental status change  Altered Mental Status   Presenting symptoms: partial responsiveness    Severity:  Unable to specify  Most recent episode:  Yesterday  Episode history:  Continuous  Duration:  2 days  Timing:  Constant  Progression:  Unchanged  Chronicity:  New  Context: dementia    Associated symptoms: fever        Review of Systems   Unable to perform ROS: Dementia   Constitutional: Positive for fever.       Past Medical History:   Diagnosis Date   • Anemia    • Clostridium difficile infection    • Dementia    • Depression    • Diabetes mellitus (CMS/[facility])    • GERD (gastroesophageal reflux disease)    • Hemorrhoid    • Hypertension    • PVD (peripheral vascular disease) (CMS/[facility])        Allergies   Allergen Reactions   • Ace Inhibitors Other (See Comments)     UNKNOWN   • Amoxicillin Other (See Comments)     UNKNOWN   • Penicillins Other (See Comments)     UNKNOWN       History reviewed. No pertinent surgical history.    History reviewed. No pertinent family history.    Social History     Socioeconomic History   • Marital status:      Spouse name: Not on file   • Number of children: Not on file   • Years of education: Not on file   • Highest education level: Not on file   Tobacco Use   • Smoking status: Unknown If Ever Smoked   Substance and Sexual Activity   • Alcohol use: No     Frequency:  Never   • Drug use: No   • Sexual activity: Defer         Objective   Physical Exam   Constitutional: She appears well-developed and well-nourished. No distress.   HENT:   Head: Normocephalic and atraumatic.   Mouth/Throat: Mucous membranes are dry.   Dry oropharynx.   Eyes: Conjunctivae are normal. No scleral icterus.   Neck: Normal range of motion. Neck supple.   Cardiovascular: Normal rate, regular rhythm and normal heart sounds.   No murmur heard.  Pulmonary/Chest: Effort normal. Tachypnea noted. No respiratory distress. She has rales in the left middle field and the left lower field.   Crackles in the left base and left mid-lung field.   Abdominal: Soft. There is no tenderness.   Musculoskeletal: She exhibits no edema.   No CCE.   Neurological:   Minimally responsive. Flaccid.   Skin: Skin is warm and dry. No rash noted. No cyanosis or erythema. Nails show no clubbing.   Nursing note and vitals reviewed.      Critical Care  Performed by: Tee Simons MD  Authorized by: Tee Simons MD     Critical care provider statement:     Critical care time (minutes):  40    Critical care time was exclusive of:  Separately billable procedures and treating other patients    Critical care was necessary to treat or prevent imminent or life-threatening deterioration of the following conditions:  Sepsis and CNS failure or compromise    Critical care was time spent personally by me on the following activities:  Evaluation of patient's response to treatment, examination of patient, obtaining history from patient or surrogate, ordering and performing treatments and interventions, ordering and review of laboratory studies, ordering and review of radiographic studies, re-evaluation of patient's condition, development of treatment plan with patient or surrogate, pulse oximetry and discussions with consultants                 ED Course  ED Course as of Apr 05 1114 Fri Apr 05, 2019   0901 Rectal temperature is 102.3  [AS]    0909 I discussed findings with Rui Pelletier, her power of .  He is a very thin layer with the patient and reports she has a good quality of life and had complete control of her mental faculties 2 weeks ago but has had a downhill course over the last week.  I discussed plan of care and he would like to pursue aggressive medications including fluids antibiotics and further care, not a palliative care at the current time.  I discussed intubation and he said he would like to make that decision at a later date based on how she responds initially, and will proceed into the ED to be with her now.  [HH]   0959 On re-evaluation she is now moving somewhat and is more responsive but with cheyne-escalanet breathing.  [AS]   1000 Paged the intensivist Dr. Major.  [AS]   1016 I discussed the findings to date with the son.  Patient is actually improved from presentation although she is poorly responsive now and certainly not anywhere near her stated baseline per her POA.  Sepsis fluid bolus is infusing.  Antibiotics are given.  She is alkalotic on ABG.I discussed case with . Pedrito will admit for definitive care to the ICU.  [HH]   1107 Patient is again reevaluated.  Her heart rate is improved markedly.  The patient suddenly awakened in the ER.  She is now conversive.  She looks markedly improved after her initial fluid bolus.  I discussed the findings with patient and family.  [HH]   1110 Patient is seen by Dr. Major.  She is awaiting transfer to the ICU.  [HH]      ED Course User Index  [AS] Bridgette Canales  [HH] Tee Simons MD     Recent Results (from the past 24 hour(s))   Comprehensive Metabolic Panel    Collection Time: 04/05/19  8:58 AM   Result Value Ref Range    Glucose 177 (H) 70 - 100 mg/dL    BUN 39 (H) 9 - 23 mg/dL    Creatinine 1.35 (H) 0.60 - 1.30 mg/dL    Sodium 147 (H) 132 - 146 mmol/L    Potassium 3.9 3.5 - 5.5 mmol/L    Chloride 111 (H) 99 - 109 mmol/L    CO2 24.0 20.0 - 31.0 mmol/L    Calcium 10.8  (H) 8.7 - 10.4 mg/dL    Total Protein 9.0 (H) 5.7 - 8.2 g/dL    Albumin 4.41 3.20 - 4.80 g/dL    ALT (SGPT) 86 (H) 7 - 40 U/L    AST (SGOT) 66 (H) 0 - 33 U/L    Alkaline Phosphatase 134 (H) 25 - 100 U/L    Total Bilirubin 0.5 0.3 - 1.2 mg/dL    eGFR Non African Amer 37 (L) >60 mL/min/1.73    Globulin 4.6 gm/dL    A/G Ratio 1.0 (L) 1.5 - 2.5 g/dL    BUN/Creatinine Ratio 28.9 (H) 7.0 - 25.0    Anion Gap 12.0 (H) 3.0 - 11.0 mmol/L   Lactic Acid, Plasma    Collection Time: 04/05/19  8:58 AM   Result Value Ref Range    Lactate 3.1 (C) 0.5 - 2.0 mmol/L   Urinalysis With Culture If Indicated - Urine, Catheter    Collection Time: 04/05/19  8:58 AM   Result Value Ref Range    Color, UA Dark Yellow (A) Yellow, Straw    Appearance, UA Turbid (A) Clear    pH, UA 8.0 5.0 - 8.0    Specific Gravity, UA 1.020 1.005 - 1.030    Glucose, UA Negative Negative    Ketones, UA Trace (A) Negative    Bilirubin, UA Negative Negative    Blood, UA Large (3+) (A) Negative    Protein, UA >=300 mg/dL (3+) (A) Negative    Leuk Esterase, UA Large (3+) (A) Negative    Nitrite, UA Negative Negative    Urobilinogen, UA 0.2 E.U./dL 0.2 - 1.0 E.U./dL   CBC Auto Differential    Collection Time: 04/05/19  8:58 AM   Result Value Ref Range    WBC 12.20 (H) 3.50 - 10.80 10*3/mm3    RBC 5.46 (H) 3.89 - 5.14 10*6/mm3    Hemoglobin 16.6 (H) 11.5 - 15.5 g/dL    Hematocrit 51.8 (H) 34.5 - 44.0 %    MCV 94.9 80.0 - 99.0 fL    MCH 30.4 27.0 - 31.0 pg    MCHC 32.0 32.0 - 36.0 g/dL    RDW 14.3 11.3 - 14.5 %    RDW-SD 48.8 37.0 - 54.0 fl    MPV 9.9 6.0 - 12.0 fL    Platelets 361 150 - 450 10*3/mm3    Neutrophil % 85.9 (H) 41.0 - 71.0 %    Lymphocyte % 10.0 (L) 24.0 - 44.0 %    Monocyte % 3.9 0.0 - 12.0 %    Eosinophil % 0.0 0.0 - 3.0 %    Basophil % 0.2 0.0 - 1.0 %    Immature Grans % 0.3 0.0 - 0.6 %    Neutrophils, Absolute 10.49 (H) 1.50 - 8.30 10*3/mm3    Lymphocytes, Absolute 1.22 0.60 - 4.80 10*3/mm3    Monocytes, Absolute 0.47 0.00 - 1.00 10*3/mm3     Eosinophils, Absolute 0.00 0.00 - 0.30 10*3/mm3    Basophils, Absolute 0.02 0.00 - 0.20 10*3/mm3    Immature Grans, Absolute 0.04 0.00 - 0.05 10*3/mm3   Light Blue Top    Collection Time: 04/05/19  8:58 AM   Result Value Ref Range    Extra Tube hold for add-on    Green Top (Gel)    Collection Time: 04/05/19  8:58 AM   Result Value Ref Range    Extra Tube Hold for add-ons.    Lavender Top    Collection Time: 04/05/19  8:58 AM   Result Value Ref Range    Extra Tube hold for add-on    Gold Top - SST    Collection Time: 04/05/19  8:58 AM   Result Value Ref Range    Extra Tube Hold for add-ons.    Urinalysis, Microscopic Only - Urine, Catheter    Collection Time: 04/05/19  8:58 AM   Result Value Ref Range    RBC, UA 21-30 (A) None Seen, 0-2 /HPF    WBC, UA Too Numerous to Count (A) None Seen, 0-2 /HPF    Bacteria, UA 4+ (A) None Seen, Trace /HPF    Squamous Epithelial Cells, UA 0-2 None Seen, 0-2 /HPF    Hyaline Casts, UA None Seen 0 - 6 /LPF    Methodology Manual Light Microscopy    Blood Gas, Arterial With Co-Ox    Collection Time: 04/05/19 10:09 AM   Result Value Ref Range    Site Left Radial     Raf's Test N/A     pH, Arterial 7.527 (H) 7.350 - 7.450 pH units    pCO2, Arterial 29.2 (L) 35.0 - 45.0 mm Hg    pO2, Arterial 76.2 (L) 83.0 - 108.0 mm Hg    HCO3, Arterial 24.2 20.0 - 26.0 mmol/L    Base Excess, Arterial 2.5 (H) 0.0 - 2.0 mmol/L    Hemoglobin, Blood Gas 15.0 14 - 18 g/dL    Hematocrit, Blood Gas 46.0 %    Oxyhemoglobin 94.9 94 - 99 %    Methemoglobin 0.70 0.00 - 1.50 %    Carboxyhemoglobin 1.5 0 - 2 %    CO2 Content 25.1 23 - 27 mmol/L    Temperature 37.0 C    Barometric Pressure for Blood Gas  mmHg    Modality Room Air     FIO2 21 %    Ventilator Mode       Note      pH, Temp Corrected 7.527 pH Units    pCO2, Temperature Corrected 29.2 (L) 35 - 45 mm Hg    pO2, Temperature Corrected 76.2 (L) 83 - 108 mm Hg     Note: In addition to lab results from this visit, the labs listed above may include labs taken  "at another facility or during a different encounter within the last 24 hours. Please correlate lab times with ED admission and discharge times for further clarification of the services performed during this visit.    XR Chest 1 View    (Results Pending)     Vitals:    04/05/19 0854 04/05/19 1015   BP: (!) 145/108 (!) 220/107   Patient Position: Lying    Pulse: (!) 134 113   Resp: 24    Temp: (!) 102.3 °F (39.1 °C)    TempSrc: Rectal    SpO2: 92% 94%   Weight: 54.4 kg (120 lb)    Height: 165.1 cm (65\")      Medications   sodium chloride 0.9 % flush 10 mL (not administered)   sodium chloride 0.9 % infusion (125 mL/hr Intravenous New Bag 4/5/19 0923)   Pharmacy to dose vancomycin (not administered)   aztreonam (AZACTAM) 2 g in sodium chloride 0.9 % 100 mL IVPB-MBP (not administered)   aztreonam (AZACTAM) 1 g in sodium chloride 0.9 % 100 mL IVPB-MBP (not administered)   sodium chloride 0.9 % bolus 1,632 mL (1,632 mL Intravenous New Bag 4/5/19 0923)   cefTRIAXone (ROCEPHIN) IVPB 2 g (2 g Intravenous New Bag 4/5/19 1006)     ECG/EMG Results (last 24 hours)     ** No results found for the last 24 hours. **        No orders to display                      MDM  Number of Diagnoses or Management Options  Dehydration:   Elevated serum lactate dehydrogenase:   Renal insufficiency:   Respiratory alkalosis:   Sepsis due to urinary tract infection (CMS/HCC):      Amount and/or Complexity of Data Reviewed  Decide to obtain previous medical records or to obtain history from someone other than the patient: yes    Critical Care  Total time providing critical care: 30-74 minutes      Final diagnoses:   Sepsis due to urinary tract infection (CMS/HCC)   Renal insufficiency   Dehydration   Respiratory alkalosis   Elevated serum lactate dehydrogenase   Altered mental status, unspecified altered mental status type       Documentation assistance provided by miriam Canales.  Information recorded by the miriam was done at my direction " and has been verified and validated by me.       Bridgette Canales  04/05/19 0900       Bridgette Canales  04/05/19 0912       Bridgette Canales  04/05/19 1020       Tee Simons MD  04/05/19 1111

## 2019-04-06 ENCOUNTER — APPOINTMENT (OUTPATIENT)
Dept: GENERAL RADIOLOGY | Facility: HOSPITAL | Age: 84
End: 2019-04-06

## 2019-04-06 LAB
ANION GAP SERPL CALCULATED.3IONS-SCNC: 10 MMOL/L (ref 3–11)
BASOPHILS # BLD AUTO: 0.01 10*3/MM3 (ref 0–0.2)
BASOPHILS NFR BLD AUTO: 0.1 % (ref 0–1)
BUN BLD-MCNC: 30 MG/DL (ref 9–23)
BUN/CREAT SERPL: 33 (ref 7–25)
CALCIUM SPEC-SCNC: 8.8 MG/DL (ref 8.7–10.4)
CHLORIDE SERPL-SCNC: 115 MMOL/L (ref 99–109)
CO2 SERPL-SCNC: 21 MMOL/L (ref 20–31)
CREAT BLD-MCNC: 0.91 MG/DL (ref 0.6–1.3)
DEPRECATED RDW RBC AUTO: 48.9 FL (ref 37–54)
EOSINOPHIL # BLD AUTO: 0.01 10*3/MM3 (ref 0–0.3)
EOSINOPHIL NFR BLD AUTO: 0.1 % (ref 0–3)
ERYTHROCYTE [DISTWIDTH] IN BLOOD BY AUTOMATED COUNT: 14.1 % (ref 11.3–14.5)
GFR SERPL CREATININE-BSD FRML MDRD: 58 ML/MIN/1.73
GLUCOSE BLD-MCNC: 143 MG/DL (ref 70–100)
HCT VFR BLD AUTO: 42.1 % (ref 34.5–44)
HGB BLD-MCNC: 13 G/DL (ref 11.5–15.5)
IMM GRANULOCYTES # BLD AUTO: 0.06 10*3/MM3 (ref 0–0.05)
IMM GRANULOCYTES NFR BLD AUTO: 0.4 % (ref 0–0.6)
LYMPHOCYTES # BLD AUTO: 1.82 10*3/MM3 (ref 0.6–4.8)
LYMPHOCYTES NFR BLD AUTO: 13.2 % (ref 24–44)
MAGNESIUM SERPL-MCNC: 1.9 MG/DL (ref 1.3–2.7)
MCH RBC QN AUTO: 29.5 PG (ref 27–31)
MCHC RBC AUTO-ENTMCNC: 30.9 G/DL (ref 32–36)
MCV RBC AUTO: 95.5 FL (ref 80–99)
MONOCYTES # BLD AUTO: 0.79 10*3/MM3 (ref 0–1)
MONOCYTES NFR BLD AUTO: 5.7 % (ref 0–12)
NEUTROPHILS # BLD AUTO: 11.14 10*3/MM3 (ref 1.5–8.3)
NEUTROPHILS NFR BLD AUTO: 80.9 % (ref 41–71)
PLATELET # BLD AUTO: 284 10*3/MM3 (ref 150–450)
PMV BLD AUTO: 9.6 FL (ref 6–12)
POTASSIUM BLD-SCNC: 2.7 MMOL/L (ref 3.5–5.5)
POTASSIUM BLD-SCNC: 3.8 MMOL/L (ref 3.5–5.5)
RBC # BLD AUTO: 4.41 10*6/MM3 (ref 3.89–5.14)
SODIUM BLD-SCNC: 146 MMOL/L (ref 132–146)
WBC NRBC COR # BLD: 13.77 10*3/MM3 (ref 3.5–10.8)

## 2019-04-06 PROCEDURE — 92610 EVALUATE SWALLOWING FUNCTION: CPT

## 2019-04-06 PROCEDURE — 97110 THERAPEUTIC EXERCISES: CPT

## 2019-04-06 PROCEDURE — 97530 THERAPEUTIC ACTIVITIES: CPT

## 2019-04-06 PROCEDURE — 84132 ASSAY OF SERUM POTASSIUM: CPT | Performed by: INTERNAL MEDICINE

## 2019-04-06 PROCEDURE — 25010000002 LINEZOLID 600 MG/300ML SOLUTION: Performed by: INTERNAL MEDICINE

## 2019-04-06 PROCEDURE — 97162 PT EVAL MOD COMPLEX 30 MIN: CPT

## 2019-04-06 PROCEDURE — 85025 COMPLETE CBC W/AUTO DIFF WBC: CPT | Performed by: INTERNAL MEDICINE

## 2019-04-06 PROCEDURE — 97165 OT EVAL LOW COMPLEX 30 MIN: CPT

## 2019-04-06 PROCEDURE — 80048 BASIC METABOLIC PNL TOTAL CA: CPT

## 2019-04-06 PROCEDURE — 71045 X-RAY EXAM CHEST 1 VIEW: CPT

## 2019-04-06 PROCEDURE — 99233 SBSQ HOSP IP/OBS HIGH 50: CPT | Performed by: INTERNAL MEDICINE

## 2019-04-06 PROCEDURE — 83735 ASSAY OF MAGNESIUM: CPT | Performed by: NURSE PRACTITIONER

## 2019-04-06 PROCEDURE — 25010000003 POTASSIUM CHLORIDE 10 MEQ/100ML SOLUTION: Performed by: NURSE PRACTITIONER

## 2019-04-06 PROCEDURE — 25010000002 MAGNESIUM SULFATE 2 GM/50ML SOLUTION: Performed by: INTERNAL MEDICINE

## 2019-04-06 RX ORDER — MAGNESIUM SULFATE HEPTAHYDRATE 40 MG/ML
2 INJECTION, SOLUTION INTRAVENOUS AS NEEDED
Status: DISCONTINUED | OUTPATIENT
Start: 2019-04-06 | End: 2019-04-11 | Stop reason: HOSPADM

## 2019-04-06 RX ORDER — AMLODIPINE BESYLATE 5 MG/1
5 TABLET ORAL
Status: DISCONTINUED | OUTPATIENT
Start: 2019-04-06 | End: 2019-04-07

## 2019-04-06 RX ORDER — MINERAL OIL AND WHITE PETROLATUM 150; 830 MG/G; MG/G
OINTMENT OPHTHALMIC
Status: DISCONTINUED | OUTPATIENT
Start: 2019-04-06 | End: 2019-04-11 | Stop reason: HOSPADM

## 2019-04-06 RX ORDER — NYSTATIN 100000 [USP'U]/G
1 POWDER TOPICAL 2 TIMES DAILY
Status: DISCONTINUED | OUTPATIENT
Start: 2019-04-06 | End: 2019-04-11 | Stop reason: HOSPADM

## 2019-04-06 RX ORDER — MAGNESIUM SULFATE 1 G/100ML
1 INJECTION INTRAVENOUS AS NEEDED
Status: DISCONTINUED | OUTPATIENT
Start: 2019-04-06 | End: 2019-04-11 | Stop reason: HOSPADM

## 2019-04-06 RX ORDER — MAGNESIUM SULFATE HEPTAHYDRATE 40 MG/ML
2 INJECTION, SOLUTION INTRAVENOUS ONCE
Status: COMPLETED | OUTPATIENT
Start: 2019-04-06 | End: 2019-04-06

## 2019-04-06 RX ORDER — ACETAMINOPHEN 325 MG/1
650 TABLET ORAL 2 TIMES DAILY
Status: DISCONTINUED | OUTPATIENT
Start: 2019-04-06 | End: 2019-04-11 | Stop reason: HOSPADM

## 2019-04-06 RX ORDER — POTASSIUM CHLORIDE 750 MG/1
40 CAPSULE, EXTENDED RELEASE ORAL AS NEEDED
Status: DISCONTINUED | OUTPATIENT
Start: 2019-04-06 | End: 2019-04-11 | Stop reason: HOSPADM

## 2019-04-06 RX ORDER — HEPARIN SODIUM 5000 [USP'U]/ML
5000 INJECTION, SOLUTION INTRAVENOUS; SUBCUTANEOUS EVERY 8 HOURS SCHEDULED
Status: DISCONTINUED | OUTPATIENT
Start: 2019-04-06 | End: 2019-04-11 | Stop reason: HOSPADM

## 2019-04-06 RX ORDER — SODIUM CHLORIDE 0.9 % (FLUSH) 0.9 %
3 SYRINGE (ML) INJECTION EVERY 12 HOURS SCHEDULED
Status: DISCONTINUED | OUTPATIENT
Start: 2019-04-06 | End: 2019-04-11 | Stop reason: HOSPADM

## 2019-04-06 RX ORDER — POTASSIUM CHLORIDE 7.45 MG/ML
10 INJECTION INTRAVENOUS
Status: DISCONTINUED | OUTPATIENT
Start: 2019-04-06 | End: 2019-04-11 | Stop reason: HOSPADM

## 2019-04-06 RX ORDER — VENLAFAXINE 37.5 MG/1
37.5 TABLET ORAL 2 TIMES DAILY WITH MEALS
Status: DISCONTINUED | OUTPATIENT
Start: 2019-04-06 | End: 2019-04-11 | Stop reason: HOSPADM

## 2019-04-06 RX ORDER — MAGNESIUM SULFATE HEPTAHYDRATE 40 MG/ML
4 INJECTION, SOLUTION INTRAVENOUS AS NEEDED
Status: DISCONTINUED | OUTPATIENT
Start: 2019-04-06 | End: 2019-04-11 | Stop reason: HOSPADM

## 2019-04-06 RX ORDER — FAMOTIDINE 20 MG/1
20 TABLET, FILM COATED ORAL DAILY
Status: DISCONTINUED | OUTPATIENT
Start: 2019-04-06 | End: 2019-04-11 | Stop reason: HOSPADM

## 2019-04-06 RX ORDER — POTASSIUM CHLORIDE 1.5 G/1.77G
40 POWDER, FOR SOLUTION ORAL AS NEEDED
Status: DISCONTINUED | OUTPATIENT
Start: 2019-04-06 | End: 2019-04-11 | Stop reason: HOSPADM

## 2019-04-06 RX ORDER — SODIUM CHLORIDE 0.9 % (FLUSH) 0.9 %
3-10 SYRINGE (ML) INJECTION AS NEEDED
Status: DISCONTINUED | OUTPATIENT
Start: 2019-04-06 | End: 2019-04-11 | Stop reason: HOSPADM

## 2019-04-06 RX ADMIN — AMLODIPINE BESYLATE 5 MG: 5 TABLET ORAL at 13:31

## 2019-04-06 RX ADMIN — FAMOTIDINE 20 MG: 20 TABLET ORAL at 13:31

## 2019-04-06 RX ADMIN — VENLAFAXINE 37.5 MG: 37.5 TABLET ORAL at 17:25

## 2019-04-06 RX ADMIN — NYSTATIN 1 APPLICATION: 100000 POWDER TOPICAL at 20:19

## 2019-04-06 RX ADMIN — POTASSIUM CHLORIDE 10 MEQ: 7.46 INJECTION, SOLUTION INTRAVENOUS at 05:48

## 2019-04-06 RX ADMIN — POTASSIUM CHLORIDE 10 MEQ: 7.46 INJECTION, SOLUTION INTRAVENOUS at 10:28

## 2019-04-06 RX ADMIN — NYSTATIN 1 APPLICATION: 100000 POWDER TOPICAL at 13:31

## 2019-04-06 RX ADMIN — POTASSIUM CHLORIDE 10 MEQ: 7.46 INJECTION, SOLUTION INTRAVENOUS at 07:55

## 2019-04-06 RX ADMIN — CEFEPIME HYDROCHLORIDE 1 G: 1 INJECTION, POWDER, FOR SOLUTION INTRAMUSCULAR; INTRAVENOUS at 13:36

## 2019-04-06 RX ADMIN — MAGNESIUM SULFATE HEPTAHYDRATE 2 G: 40 INJECTION, SOLUTION INTRAVENOUS at 08:47

## 2019-04-06 RX ADMIN — LINEZOLID 600 MG: 600 INJECTION, SOLUTION INTRAVENOUS at 03:48

## 2019-04-06 RX ADMIN — POTASSIUM CHLORIDE 10 MEQ: 7.46 INJECTION, SOLUTION INTRAVENOUS at 08:58

## 2019-04-06 RX ADMIN — POTASSIUM CHLORIDE 10 MEQ: 7.46 INJECTION, SOLUTION INTRAVENOUS at 12:04

## 2019-04-06 RX ADMIN — POTASSIUM CHLORIDE 10 MEQ: 7.46 INJECTION, SOLUTION INTRAVENOUS at 06:52

## 2019-04-06 RX ADMIN — ACETAMINOPHEN 650 MG: 325 TABLET, FILM COATED ORAL at 13:31

## 2019-04-06 NOTE — PLAN OF CARE
Problem: Skin Injury Risk (Adult)  Goal: Skin Health and Integrity  Outcome: Ongoing (interventions implemented as appropriate)   04/06/19 0634   Skin Injury Risk (Adult)   Skin Health and Integrity achieves outcome       Problem: Fall Risk (Adult)  Goal: Absence of Fall  Outcome: Ongoing (interventions implemented as appropriate)   04/06/19 0634   Fall Risk (Adult)   Absence of Fall achieves outcome       Problem: Patient Care Overview  Goal: Plan of Care Review  Outcome: Ongoing (interventions implemented as appropriate)   04/06/19 0634   Coping/Psychosocial   Plan of Care Reviewed With patient   Plan of Care Review   Progress improving   OTHER   Outcome Summary Pt AOX1 to name. Able to communicate. [place]. SBP<180. BM x1 large, digital disimpaction. More awake, alert this morning. AM lab K 2.7 replacement started.    Coping/Psychosocial   Patient Agreement with Plan of Care unable to participate       Problem: Sepsis/Septic Shock (Adult)  Goal: Signs and Symptoms of Listed Potential Problems Will be Absent, Minimized or Managed (Sepsis/Septic Shock)  Outcome: Ongoing (interventions implemented as appropriate)   04/05/19 5695   Goal/Outcome Evaluation   Problems Assessed (Sepsis) all   Problems Present (Sepsis) none

## 2019-04-06 NOTE — PLAN OF CARE
Problem: Patient Care Overview  Goal: Plan of Care Review  Outcome: Ongoing (interventions implemented as appropriate)   04/06/19 4838   Coping/Psychosocial   Plan of Care Reviewed With patient   OTHER   Outcome Summary OT initial eval and brief chart review completed. Pt presents with decreased independence with ADL's and mobility. Recommend continued skilled OT services and return to ECF at d/c.

## 2019-04-06 NOTE — PROGRESS NOTES
"Critical Care Note     LOS: 1 day   Patient Care Team:  Maria Puentes APRN as PCP - General (Nurse Practitioner)    Chief Complaint/Reason for visit:    Chief Complaint   Patient presents with   • Altered Mental Status   urosepsis    Subjective     89-year-old woman, nursing home resident with dementia and a history of recurrent urinary tract infections presenting with altered mentation and found to have pyuria, fever.  Her son indicates that over the last several months he seen her become visibly weaker.  Currently she cannot get around without assistance.    Interval History:   Afebrile  Room air saturation 95%  Blood pressure elevated  Tolerated dysphagia diet  Incontinent of urine  Serum creatinine normalized at 0.91  Potassium low at 2.7, being replaced    Review of Systems:    All systems were reviewed and negative except as noted in subjective.    Medical history, surgical history, social history, family history reviewed    Objective     Intake/Output:    Intake/Output Summary (Last 24 hours) at 4/6/2019 1224  Last data filed at 4/6/2019 1204  Gross per 24 hour   Intake 3753.49 ml   Output 800 ml   Net 2953.49 ml       Nutrition:  Diet Pureed; Nectar / Syrup Thick; Cardiac    Infusions:    niCARdipine 5-15 mg/hr Last Rate: Stopped (04/05/19 1616)   sodium chloride 125 mL/hr Last Rate: Stopped (04/05/19 1244)       Telemetry: Sinus rhythm             Vital Signs  Blood pressure 172/86, pulse 96, temperature 97.9 °F (36.6 °C), temperature source Axillary, resp. rate 22, height 165.1 cm (65\"), weight 50 kg (110 lb 3.7 oz), SpO2 95 %.    Physical Exam:  General Appearance:   Elderly white woman in no distress   Head:   Normocephalic, atraumatic   Eyes:          Nipples are equal and reactive to light, no jaundice   Ears:     Throat:  Oral mucosa moist   Neck:  Limited range of motion, trachea midline, no JVD, no palpable thyroid   Back:    Thoracic spine kyphosis   Lungs:    Symmetric chest expansion.  Breath " sounds bilateral, equal, clear    Heart:   Regular rhythm, S1, S2 auscultated, no murmur   Abdomen:    Bowel sounds present, nontender, nondistended   Rectal:   Deferred   Extremities:  Generalized decreased musculature.  No pitting edema   Pulses:    Skin:  Intact without any breakdown   Lymph nodes:    Neurologic:  Awake, hard of hearing, follows simple commands      Results Review:     I reviewed the patient's new clinical results.   Results from last 7 days   Lab Units 04/06/19  0352 04/05/19  0858   SODIUM mmol/L 146 147*   POTASSIUM mmol/L 2.7* 3.9   CHLORIDE mmol/L 115* 111*   CO2 mmol/L 21.0 24.0   BUN mg/dL 30* 39*   CREATININE mg/dL 0.91 1.35*   CALCIUM mg/dL 8.8 10.8*   BILIRUBIN mg/dL  --  0.5   ALK PHOS U/L  --  134*   ALT (SGPT) U/L  --  86*   AST (SGOT) U/L  --  66*   GLUCOSE mg/dL 143* 177*     Results from last 7 days   Lab Units 04/06/19  0352 04/05/19  0858   WBC 10*3/mm3 13.77* 12.20*   HEMOGLOBIN g/dL 13.0 16.6*   HEMATOCRIT % 42.1 51.8*   PLATELETS 10*3/mm3 284 361     Results from last 7 days   Lab Units 04/05/19  1009   PH, ARTERIAL pH units 7.527*   PO2 ART mm Hg 76.2*   PCO2, ARTERIAL mm Hg 29.2*   HCO3 ART mmol/L 24.2     Lab Results   Component Value Date    BLOODCX No growth at 24 hours 04/05/2019     Lab Results   Component Value Date    URINECX >100,000 CFU/mL Gram Negative Bacilli (A) 04/05/2019       I reviewed the patient's new imaging including images and reports.      All medications reviewed.     cefepime 1 g Intravenous Q12H   Linezolid 600 mg Intravenous Q12H         Assessment/Plan       Sepsis due to urinary tract infection (CMS/HCC)    TAMI (acute kidney injury) (CMS/HCC)    Diabetes mellitus (CMS/HCC)    Lactic acidosis    Hypertension    Depression    Dementia    Transaminitis      89-year-old woman hospitalized with sepsis secondary to recurrent urinary tract infection with associated lactic acidosis and renal insufficiency.  Overnight with hydration her creatinine has  normalized and temperature has normalized as well.  Blood pressure is now elevated.  She takes Norvasc, hydrochlorothiazide as an outpatient.  Urine culture is showing a gram-negative marlin.    PLAN:  Resume antihypertensives, except the diuretic  Continue cefepime, stop Zyvox given gram-negative marlin on culture  Follow-up blood and urine cultures  Mobilize to the chair  Dysphasia diet  Start H2 blocker  Monitor electrolytes and replace    VTE Prophylaxis:SCDS    Stress Ulcer Prophylaxis:none    Elaina Frost MD  04/06/19  12:24 PM      Time:35min  I personally provided care to this critically ill patient as documented above.  Critical care time does not include time spent on separately billed procedures.  Non of my critical care time was concurrent with other critical care providers.

## 2019-04-06 NOTE — PLAN OF CARE
Problem: Skin Injury Risk (Adult)  Goal: Skin Health and Integrity  Outcome: Ongoing (interventions implemented as appropriate)      Problem: Fall Risk (Adult)  Goal: Absence of Fall  Outcome: Ongoing (interventions implemented as appropriate)      Problem: Sepsis/Septic Shock (Adult)  Goal: Signs and Symptoms of Listed Potential Problems Will be Absent, Minimized or Managed (Sepsis/Septic Shock)  Outcome: Ongoing (interventions implemented as appropriate)      Problem: Patient Care Overview  Goal: Plan of Care Review  Outcome: Ongoing (interventions implemented as appropriate)

## 2019-04-06 NOTE — PLAN OF CARE
Problem: Patient Care Overview  Goal: Plan of Care Review  Outcome: Ongoing (interventions implemented as appropriate)   04/06/19 4261   Coping/Psychosocial   Plan of Care Reviewed With patient   Plan of Care Review   Progress no change   SLP re-evaluation completed. Will address dysphagia. Please see note for further details and recommendations.

## 2019-04-06 NOTE — PLAN OF CARE
Problem: Patient Care Overview  Goal: Plan of Care Review  Outcome: Ongoing (interventions implemented as appropriate)   04/06/19 5667   Coping/Psychosocial   Plan of Care Reviewed With patient   Plan of Care Review   Progress improving   OTHER   Outcome Summary Presents w/ evolving sympt incl. dehy, lact.acid, elev BP (172/86 p/t PT), lethargy, abn.electrolytes,decr.focusing on task(unable to focus onLE's during ther.ex), underlying Dem.,decr.strength/endurance & impaired funct mobil;able to sixto rolling/bed mob,sitting activ.x4 min & LE ther.ex, but c/o B ankle pain & fatigue; recommend ret. to ECF at d/c

## 2019-04-06 NOTE — PLAN OF CARE
Problem: Patient Care Overview  Goal: Plan of Care Review   04/06/19 7804   OTHER   Outcome Summary Pt awke for meals, but still not eating much. IVF and home meds ordred. Pt remains only oriented to person, follows commands. PT/OT worked with pt in bed. K/Mag replaced. Ordered to floor.

## 2019-04-06 NOTE — THERAPY EVALUATION
Acute Care - Occupational Therapy Initial Evaluation  [facility]     Patient Name: Roro Guzman  : 1926  MRN: [id number]  Today's Date: 2019  Onset of Illness/Injury or Date of Surgery: 19  Date of Referral to OT: 19  Referring Physician: Dr. Garay    Admit Date: 2019       ICD-10-CM ICD-9-CM   1. Sepsis due to urinary tract infection (CMS/[facility]) A41.9 038.9    N39.0 995.91     599.0   2. Renal insufficiency N28.9 593.9   3. Dehydration E86.0 276.51   4. Respiratory alkalosis E87.3 276.3   5. Elevated serum lactate dehydrogenase R74.0 790.4   6. Altered mental status, unspecified altered mental status type R41.82 780.97   7. Impaired mobility and ADLs Z74.09 799.89     Patient Active Problem List   Diagnosis   • Sepsis due to urinary tract infection (CMS/[facility])   • Hypertension   • Diabetes mellitus (CMS/[facility])   • Depression   • Dementia   • TAMI (acute kidney injury) (CMS/[facility])   • Lactic acidosis   • Transaminitis     Past Medical History:   Diagnosis Date   • Anemia    • Clostridium difficile infection    • Dementia    • Depression    • Diabetes mellitus (CMS/[facility])    • GERD (gastroesophageal reflux disease)    • Hemorrhoid    • Hypertension    • PVD (peripheral vascular disease) (CMS/[facility])      History reviewed. No pertinent surgical history.       OT ASSESSMENT FLOWSHEET (last 12 hours)      Occupational Therapy Evaluation     Row Name 19 1352                   OT Evaluation Time/Intention    Subjective Information  no complaints  -JR        Document Type  evaluation  -JR        Mode of Treatment  occupational therapy  -JR        Patient Effort  good  -JR        Symptoms Noted During/After Treatment  none  -JR           General Information    Patient Profile Reviewed?  yes  -JR        Onset of Illness/Injury or Date of Surgery  19  -JR        Referring Physician  Dr. Garay  -JR        Prior Level of Function  independent:;feeding;mod assist:;transfer;max  assist:;dressing;bathing Son reports 1-2 person assist with transfers  -JR        Equipment Currently Used at Home  wheelchair  -JR        Pertinent History of Current Functional Problem  Pt with h/o dementia and recurrent UTI admitted from [facility] with AMS, found to have pyuria and fever. Pt's son reports she has been less responsive and unable to feed herself x 1 week.  -JR        Existing Precautions/Restrictions  fall dementia  -JR        Risks Reviewed  patient and family:;increased discomfort  -JR        Benefits Reviewed  patient and family:;improve function;increase independence  -JR        Barriers to Rehab  medically complex;previous functional deficit;cognitive status  -JR           Relationship/Environment    Primary Source of Support/Comfort  child(susan)  -JR           Resource/Environmental Concerns    Current Living Arrangements  extended care facility  -JR           Cognitive Assessment/Interventions    Additional Documentation  Cognitive Assessment/Intervention (Group)  -JR           Cognitive Assessment/Intervention- PT/OT    Affect/Mental Status (Cognitive)  confused  -JR        Orientation Status (Cognition)  oriented to;person Unable to state last name, oriented to   -JR        Follows Commands (Cognition)  follows one step commands;0-24% accuracy;delayed response/completion;increased processing time needed;verbal cues/prompting required  -JR        Safety Deficit (Cognitive)  severe deficit  -JR           Bed Mobility Assessment/Treatment    Comment (Bed Mobility)  Son refused OOB to chair this date  -JR           ADL Assessment/Intervention    BADL Assessment/Intervention  grooming  -JR           Grooming Assessment/Training    [place] Level (Grooming)  wash face, hands;maximum assist (25% patient effort);verbal cues  -JR        Grooming Position  supine  -JR           General ROM    GENERAL ROM COMMENTS  B UE ROM WFL  -JR           MMT (Manual Muscle Testing)    General MMT  Comments  Functionally 2+/5  -JR           Motor Assessment/Interventions    Additional Documentation  Therapeutic Exercise (Group);Therapeutic Exercise Interventions (Group)  -JR           Therapeutic Exercise    Upper Extremity Range of Motion (Therapeutic Exercise)  shoulder flexion/extension, bilateral;shoulder abduction/adduction, bilateral;elbow flexion/extension, bilateral;forearm supination/pronation, bilateral  -JR        Exercise Type (Therapeutic Exercise)  PROM (passive range of motion)  -JR        Position (Therapeutic Exercise)  supine  -JR        Sets/Reps (Therapeutic Exercise)  1/10  -JR           Sensory Assessment/Intervention    Sensory General Assessment  no sensation deficits identified  -JR           Positioning and Restraints    Pre-Treatment Position  in bed  -JR        Post Treatment Position  bed  -JR        In Bed  notified nsg;supine;call light within reach;encouraged to call for assist;exit alarm on;with family/caregiver  -JR           Pain Scale: FACES Pre/Post-Treatment    Pain: FACES Scale, Pretreatment  0-->no hurt  -JR        Pain: FACES Scale, Post-Treatment  0-->no hurt  -JR           Plan of Care Review    Plan of Care Reviewed With  patient  -JR           Clinical Impression (OT)    Date of Referral to OT  04/05/19  -JR        OT Diagnosis  Decreased independence with ADL's and moblity  -JR        Patient/Family Goals Statement (OT Eval)  Son would like pt to be able to [facility]  -        Criteria for Skilled Therapeutic Interventions Met (OT Eval)  yes;treatment indicated  -JR        Rehab Potential (OT Eval)  fair, will monitor progress closely  -JR        Therapy Frequency (OT Eval)  daily  -JR        Care Plan Review (OT)  risks/benefits reviewed;patient/other agree to care plan  -JR        Anticipated Discharge Disposition (OT)  extended care facility  -JR           Vital Signs    Pre Systolic BP Rehab  172  -JR        Pre Treatment Diastolic BP  90  -JR         Post Systolic BP Rehab  142  -JR        Post Treatment Diastolic BP  82  -JR        Pretreatment Heart Rate (beats/min)  92  -JR        Posttreatment Heart Rate (beats/min)  92  -JR        Pre SpO2 (%)  96  -JR        O2 Delivery Pre Treatment  room air  -JR        Post SpO2 (%)  97  -JR        O2 Delivery Post Treatment  room air  -JR        Pre Patient Position  Supine  -JR        Intra Patient Position  Supine  -JR        Post Patient Position  Supine  -JR           Planned OT Interventions    Planned Therapy Interventions (OT Eval)  BADL retraining;patient/caregiver education/training;ROM/therapeutic exercise;strengthening exercise;transfer/mobility retraining  -JR           OT Goals    Grooming Goal Selection (OT)  grooming, OT goal 1  -JR        Self-Feeding Goal Selection (OT)  self feeding, OT goal 1  -JR        Strength Goal Selection (OT)  strength, OT goal 1  -JR        Additional Documentation  Self-Feeding Goal Selection (OT) (Row);Grooming Goal Selection (OT) (Row);Strength Goal Selection (OT) (Row)  -JR           Grooming Goal 1 (OT)    Activity/Device (Grooming Goal 1, OT)  wash face, hands;hair care  -JR        [place] (Grooming Goal 1, OT)  moderate assist (50-74% patient effort);verbal cues required  -JR        Time Frame (Grooming Goal 1, OT)  long term goal (LTG);2 weeks  -JR        Progress/Outcome (Grooming Goal 1, OT)  goal ongoing  -JR           Self-Feeding Goal 1 (OT)    Activity/Assistive Device (Self-Feeding Goal 1, OT)  self-feeding skills, all  -JR        [place] Level/Cues Needed (Self-Feeding Goal 1, OT)  moderate assist (50-74% patient effort);verbal cues required  -JR        Time Frame (Self-Feeding Goal 1, OT)  long term goal (LTG);2 weeks  -JR        Progress/Outcomes (Self-Feeding Goal 1, OT)  goal ongoing  -JR           Strength Goal 1 (OT)    Strength Goal 1 (OT)  Pt to participate with B UE AAROM x 10 reps to support ADL participation.  -JR        Time Frame  (Strength Goal 1, OT)  long term goal (LTG);2 weeks  -JR        Progress/Outcome (Strength Goal 1, OT)  goal ongoing  -JR          User Key  (r) = Recorded By, (t) = Taken By, (c) = Cosigned By    Initials Name Effective Dates    JR Patito James OT 06/22/15 -          Occupational Therapy Education     Title: PT OT SLP Therapies (In Progress)     Topic: Occupational Therapy (In Progress)     Point: ADL training (In Progress)     Description: Instruct learner(s) on proper safety adaptation and remediation techniques during self care or transfers.   Instruct in proper use of assistive devices.    Learning Progress Summary           Patient Acceptance, E, NR by  at 4/6/2019  1:52 PM    Comment:  Educated pt and family regarding role of therapy and ongoing treatment plan.                               User Key     Initials Effective Dates Name Provider Type Discipline     06/22/15 -  Patito James, OT Occupational Therapist OT                  OT Recommendation and Plan  Outcome Summary/Treatment Plan (OT)  Anticipated Discharge Disposition (OT): extended care facility  Planned Therapy Interventions (OT Eval): BADL retraining, patient/caregiver education/training, ROM/therapeutic exercise, strengthening exercise, transfer/mobility retraining  Therapy Frequency (OT Eval): daily  Plan of Care Review  Plan of Care Reviewed With: patient  Plan of Care Reviewed With: patient  Outcome Summary: OT initial eval and brief chart review completed. Pt presents with decreased independence with ADL's and mobility. Recommend continued skilled OT services and return to ECF at d/c.    Outcome Measures     Row Name 04/06/19 8876             How much help from another is currently needed...    Putting on and taking off regular lower body clothing?  1  -JR      Bathing (including washing, rinsing, and drying)  1  -JR      Toileting (which includes using toilet bed pan or urinal)  1  -JR      Putting on and taking off regular  upper body clothing  1  -JR      Taking care of personal grooming (such as brushing teeth)  1  -JR      Eating meals  1  -JR      Score  6  -         Functional Assessment    Outcome Measure Options  AM-PAC 6 Clicks Daily Activity (OT)  -        User Key  (r) = Recorded By, (t) = Taken By, (c) = Cosigned By    Initials Name Provider Type    Patito Ray OT Occupational Therapist          Time Calculation:   Time Calculation- OT     Row Name 04/06/19 1352             Time Calculation- OT    OT Start Time  1352  -      OT Received On  04/06/19  -      OT Goal Re-Cert Due Date  04/16/19  -        User Key  (r) = Recorded By, (t) = Taken By, (c) = Cosigned By    Initials Name Provider Type     Patito James OT Occupational Therapist        Therapy Charges for Today     Code Description Service Date Service Provider Modifiers Qty    [id number]  OT EVAL LOW COMPLEXITY 4 4/6/2019 Patito James OT GO 1               Patito James OT  4/6/2019

## 2019-04-06 NOTE — PROGRESS NOTES
"                  Clinical Nutrition     Nutrition Assessment  Reason for Visit:   Difficulty chewing/swallowing, MST score 2+, Unintentional weight loss, Reduced oral intake      Patient Name: Roro Guzman  YOB: 1926  MRN: [id number]  Date of Encounter: 04/06/19 2:40 PM  Admission date: 4/5/2019      Nutrition Assessment   Assessment       Admission diagnosis     Sepsis due to urinary tract infection (CMS/HCC)    Additional applicable diagnosis/conditions/procedures this adm   AMS  TAMI  Lactic acidosis  Suspected pharyngeal dysfunction    (4/6) S/p SLP evaluation at bedside: SLP Diet Recommendation: puree, nectar thick liquids      Applicable PMH:  Hypertension  Diabetes mellitus (CMS/HCC)  Depression  Dementia  PVD  CDiff  GERD    Pt [facility] a [facility] at [facility]    Reported/Observed/Food/Nutrition Related History:      Pts son states that pt has lost weight recently with UBW being at approx 128lb, he states pt has not been eating well for 3-4 weeks, unclear of how much less than normal she's been eating. He states pts recurrent UTI's have seemed to affect her overall status and her PO intake. He states she loves desserts and will drink Boost ONS.       Anthropometrics     Height: 165.1 cm (65\")  Last filed wt: Weight: 50 kg (110 lb 3.7 oz) (04/05/19 1300)  Weight Method: Bed scale    BMI: BMI (Calculated): 18.3  Underweight:<18.5kg/m2    Ideal Body Weight (IBW) (kg): 57.29    Weight Change   UBW: 128lb   Weight change: will await to calculate as fluid status likley affecting adm wt     Labs reviewed     Results from last 7 days   Lab Units 04/06/19  0352 04/05/19  0858   GLUCOSE mg/dL 143* 177*   BUN mg/dL 30* 39*   CREATININE mg/dL 0.91 1.35*   SODIUM mmol/L 146 147*   CHLORIDE mmol/L 115* 111*   POTASSIUM mmol/L 2.7* 3.9   MAGNESIUM mg/dL 1.9  --    ALT (SGPT) U/L  --  86*     Results from last 7 days   Lab Units 04/05/19  0858   ALBUMIN g/dL 4.41             No results found for: " HGBA1C      Medications reviewed   Pertinent:  GTT:NaCl@50mL/hr    Current Nutrition Prescription     PO: Diet Pureed; Nectar / Syrup Thick; Cardiac    Intake: insufficent data            Nutrition Diagnosis     4/6  Problem Predicted suboptimal energy intake   Etiology PO intake trend PTA   Signs/Symptoms Poor per report        Nutrition Intervention   1.  Follow treatment progress, Care plan reviewed  2. Food preferences noted for   3. ONS added: Boost GC TID      Goal:   General: Nutrition support treatment  PO: Establish PO      Monitoring/Evaluation:   Per protocol, PO intake, Supplement intake, Weight      Will Continue to follow per protocol      Marsha Downs RDN, LD  Time Spent: 30min

## 2019-04-06 NOTE — THERAPY EVALUATION
"Acute Care - Physical Therapy Initial Evaluation  [facility]     Patient Name: Roro Guzman  : 1926  MRN: [id number]  Today's Date: 2019   Onset of Illness/Injury or Date of Surgery: 19  Date of Referral to PT: 19  Referring Physician: YODIT Garay MD      Admit Date: 2019    Visit Dx:     ICD-10-CM ICD-9-CM   1. Sepsis due to urinary tract infection (CMS/HCC) A41.9 038.9    N39.0 995.91     599.0   2. Renal insufficiency N28.9 593.9   3. Dehydration E86.0 276.51   4. Respiratory alkalosis E87.3 276.3   5. Elevated serum lactate dehydrogenase R74.0 790.4   6. Altered mental status, unspecified altered mental status type R41.82 780.97   7. Impaired mobility and ADLs Z74.09 799.89   8. Impaired functional mobility, balance, gait, and endurance Z74.09 V49.89     Patient Active Problem List   Diagnosis   • Sepsis due to urinary tract infection (CMS/HCC)   • Hypertension   • Diabetes mellitus (CMS/HCC)   • Depression   • Dementia   • TAMI (acute kidney injury) (CMS/[facility])   • Lactic acidosis   • Transaminitis     Past Medical History:   Diagnosis Date   • Anemia    • Clostridium difficile infection    • Dementia    • Depression    • Diabetes mellitus (CMS/HCC)    • GERD (gastroesophageal reflux disease)    • Hemorrhoid    • Hypertension    • PVD (peripheral vascular disease) (CMS/[facility])      History reviewed. No pertinent surgical history.     PT ASSESSMENT (last 12 hours)      Physical Therapy Evaluation     Row Name 19 1448          PT Evaluation Time/Intention    Subjective Information  complains of;fatigue sonHadRefUICw/OT(\"unableToTol.\");nsgOK'dBed inChairPosDavid shetty  -DM     Document Type  evaluation  -DM     Mode of Treatment  individual therapy;physical therapy  -DM     Patient Effort  fair  -DM     Symptoms Noted During/After Treatment  fatigue;increased pain  -DM     Comment  INCONT.;PCTin to change/bathe w/PT present;req.reposn.L SIDELYING   -DM     Row Name 19 1448          " "General Information    Patient Profile Reviewed?  yes  -DM     Onset of Illness/Injury or Date of Surgery  04/05/19  -DM     Referring Physician  YODIT Garay MD  -DM     Patient Observations  alert;cooperative;agree to therapy EYES open;nsg:pt.readWordsOnBoard,echoed\"[employer]\"(onTV)  -DM     Patient/Family Observations  son had left room  -DM     General Observations of Patient  R sidelying in ICU bed;tele,IV,RA,PureWick (changed),exit alarm;eyes following Staff but unable to focus purposefully; req. bed/linen change  -DM     Prior Level of Function  mod assist:;transfer mod2A SPT(W/CrolledToBR);maxAdsg/bathe,bedMob;indepFeeding  -DM     Equipment Currently Used at Home  wheelchair  -DM     Pertinent History of Current Functional Problem  onset AMS,FEVER@[facility];to [facility] ER w/ recurr UTI, Lact.acid, TAMI,dehy, transaminitis;H/o Dem, depression;in noted elevBP in ER;once rehydrated, fever subsided  -DM     Existing Precautions/Restrictions  fall;other (see comments) Dem;exit alarm  -DM     Risks Reviewed  patient:;increased discomfort;change in vital signs;lines disloged  -DM     Benefits Reviewed  patient:;improve function;increase independence;increase strength;decrease pain;increase knowledge  -DM     Barriers to Rehab  medically complex;previous functional deficit;cognitive status  -DM     Row Name 04/06/19 144          Relationship/Environment    Primary Source of Support/Comfort  child(susan)  -DM     Lives With  facility resident [place]  -DM     Family Caregiver if Needed  child(susan), adult son provided PMH to STAFF  -DM     Row Name 04/06/19 1447          Resource/Environmental Concerns    Current Living Arrangements  extended care facility  -DM     Resource/Environmental Concerns  none  -DM     Transportation Concerns  car, none  -DM     Row Name 04/06/19 1448          Cognitive Assessment/Interventions    Additional Documentation  Cognitive Assessment/Intervention (Group)  -DM     Row Name 04/06/19 " "1448          Cognitive Assessment/Intervention- PT/OT    Affect/Mental Status (Cognitive)  confused  -DM     Orientation Status (Cognition)  oriented to;person puyhov8wvFstz,thenPersev.w/\"usedToPlayTogether w/Murell\"  -DM     Follows Commands (Cognition)  follows one step commands;0-24% accuracy  -DM     Safety Deficit (Cognitive)  severe deficit;insight into deficits/self awareness;safety precautions follow-through/compliance  -DM     Personal Safety Interventions  fall prevention program maintained  -DM     Row Name 04/06/19 1448          Safety Issues, Functional Mobility    Impairments Affecting Function (Mobility)  balance;cognition;endurance/activity tolerance;pain;strength  -DM     Row Name 04/06/19 1448          Bed Mobility Assessment/Treatment    Bed Mobility Assessment/Treatment  rolling left;rolling right;scooting/bridging;supine-sit;sit-supine  -DM     Rolling Left [place] (Bed Mobility)  verbal cues;dependent (less than 25% patient effort);2 person assist PT/NsgRolled/PCTbathed s/pIncont;linensChanged  -DM     Rolling Right [place] (Bed Mobility)  verbal cues;dependent (less than 25% patient effort);2 person assist  -DM     Scooting/Bridging [place] (Bed Mobility)  verbal cues;dependent (less than 25% patient effort);2 person assist to HOB  -DM     Supine-Sit [place] (Bed Mobility)  verbal cues;dependent (less than 25% patient effort) Bed placed in chair position for 4 min.  -DM     Sit-Supine [place] (Bed Mobility)  verbal cues;dependent (less than 25% patient effort)  -DM     Bed Mobility, Safety Issues  cognitive deficits limit understanding;decreased use of arms for pushing/pulling;decreased use of legs for bridging/pushing;impaired trunk control for bed mobility  -DM     Assistive Device (Bed Mobility)  bed rails;draw sheet  -DM     Comment (Bed Mobility)  def. EOBd/tSonDecl.earlier duringOTev.;s/p therEx,sit.activ.,reposn.in L sidelying per nsg req.  -DM     Row Name " "04/06/19 1448          Transfer Assessment/Treatment    Comment (Transfers)  def.per nsg(son had decl.)  -DM     Row Name 04/06/19 1448          Gait/Stairs Assessment/Training    [place] Level (Gait)  unable to assess  -DM     Row Name 04/06/19 1448          General ROM    GENERAL ROM COMMENTS  min.act.mvmtBLE;B foot drop@rest (L > R);pass.L ankleDF guy. 50%,R ankle DF Guy. 25 - 30 %  -DM     Row Name 04/06/19 1448          MMT (Manual Muscle Testing)    General MMT Comments  unable to participate in MMT;LLE grossly 1 to 1+ /5, & RLE 1 + to 2-/5  -DM     Row Name 04/06/19 1448          Motor Assessment/Intervention    Additional Documentation  Balance (Group);Balance Interventions (Group);Therapeutic Exercise (Group);Therapeutic Exercise Interventions (Group)  -DM     Row Name 04/06/19 1448          Therapeutic Exercise    21575 - PT Therapeutic Exercise Minutes  19  -DM     37766 - PT Therapeutic Activity Minutes  16  -DM     Row Name 04/06/19 1448          Therapeutic Exercise    Lower Extremity (Therapeutic Exercise)  gastroc stretch, bilateral;hamstring stretch, bilateral  -DM     Lower Extremity Range of Motion (Therapeutic Exercise)  hip flexion/extension, bilateral;hip abduction/adduction, bilateral;hip internal/external rotation, bilateral;knee flexion/extension, bilateral;ankle dorsiflexion/plantar flexion, bilateral A/A BKFO;maxA HipAbd/add,rot,SAQ,H.slides,AP  -DM     Exercise Type (Therapeutic Exercise)  AAROM (active assistive range of motion);PROM (passive range of motion);isometric contraction, static;static stretching  -DM     Position (Therapeutic Exercise)  seated;supine  -DM     Sets/Reps (Therapeutic Exercise)  1/10  -DM     Comment (Therapeutic Exercise)  ANTI-GRAV.exer (INCL.Neck) w/bed in chair posn.x 4 min.;remainder in sup.,w/freq rests; onset B ankle discomfort;nods \"yes\" when asked if arthritis pain   -DM     Row Name 04/06/19 1448          Balance    Balance  static sitting " balance;dynamic sitting balance  -DM     Row Name 04/06/19 1448          Static Sitting Balance    Level of [place] (Unsupported Sitting, Static Balance)  dependent, less than 25% patient effort  -DM     Sitting Position (Unsupported Sitting, Static Balance)  other (see comments) bed in chair posn.  -DM     Time Able to Maintain Position (Unsupported Sitting, Static Balance)  3 to 4 minutes  -DM     Comment (Unsupported Sitting, Static Balance)  did anti-grav exer LE's,neck A/AROM  -DM     Row Name 04/06/19 1448          Dynamic Sitting Balance    Level of [place], Reaches Outside Midline (Sitting, Dynamic Balance)  dependent, less than 25% patient effort  -DM     Sitting Position, Reaches Outside Midline (Sitting, Dynamic Balance)  other (see comments) bed in chair posn.  -DM     Comment, Reaches Outside Midline (Sitting, Dynamic Balance)  WS trunk L/R & F/B; pillows supp. B UE's  -DM     Row Name 04/06/19 1448          Pain Assessment    Additional Documentation  Pain Scale: FACES Pre/Post-Treatment (Group)  -DM     Row Name 04/06/19 1448          Pain Scale: Numbers Pre/Post-Treatment    Pain Location - Side  Bilateral  -DM     Pain Location  ankle  -DM     Pain Intervention(s)  Repositioned;Rest  -DM     Row Name 04/06/19 1448          Pain Scale: FACES Pre/Post-Treatment    Pain: FACES Scale, Pretreatment  2-->hurts little bit  -DM     Pain: FACES Scale, Post-Treatment  4-->hurts little more  -DM     Row Name 04/06/19 1448          Plan of Care Review    Plan of Care Reviewed With  patient  -DM     Row Name 04/06/19 1448          Physical Therapy Clinical Impression    Date of Referral to PT  04/05/19  -DM     PT Diagnosis (PT Clinical Impression)  impaired funct mobil  -DM     Criteria for Skilled Interventions Met (PT Clinical Impression)  yes;treatment indicated  -DM     Pathology/Pathophysiology Noted (Describe Specifically for Each System)  pulmonary;genitourinary  -DM     Impairments Found  (describe specific impairments)  gait, locomotion, and balance  -DM     Rehab Potential (PT Clinical Summary)  fair, will monitor progress closely  -DM     Care Plan Review (PT)  evaluation/treatment results reviewed;patient/other agree to care plan  -DM     Row Name 04/06/19 1448          Vital Signs    Pre Systolic BP Rehab  142  -DM     Pre Treatment Diastolic BP  82  -DM     Post Systolic BP Rehab  144  -DM     Post Treatment Diastolic BP  83  -DM     Pretreatment Heart Rate (beats/min)  82  -DM     Intratreatment Heart Rate (beats/min)  96  -DM     Posttreatment Heart Rate (beats/min)  88  -DM     Pre SpO2 (%)  96  -DM     O2 Delivery Pre Treatment  room air  -DM     Intra SpO2 (%)  95  -DM     O2 Delivery Intra Treatment  room air  -DM     Post SpO2 (%)  97  -DM     O2 Delivery Post Treatment  room air  -DM     Pre Patient Position  Side Lying  -DM     Intra Patient Position  Sitting  -DM     Post Patient Position  Side Lying  -DM     Row Name 04/06/19 1448          Physical Therapy Goals    Bed Mobility Goal Selection (PT)  bed mobility, PT goal 1  -DM     Transfer Goal Selection (PT)  transfer, PT goal 1  -DM     Balance Goal Selection (PT)  balance, PT goal 1  -DM     Additional Documentation  Balance Goal Selection (PT) (Row)  -DM     Row Name 04/06/19 1448          Bed Mobility Goal 1 (PT)    Activity/Assistive Device (Bed Mobility Goal 1, PT)  bed mobility activities, all  -DM     [place] Level/Cues Needed (Bed Mobility Goal 1, PT)  maximum assist (25-49% patient effort)  -DM     Time Frame (Bed Mobility Goal 1, PT)  long term goal (LTG);1 week  -DM     Row Name 04/06/19 1448          Transfer Goal 1 (PT)    Activity/Assistive Device (Transfer Goal 1, PT)  sit-to-stand/stand-to-sit;bed-to-chair/chair-to-bed  -DM     [place] Level/Cues Needed (Transfer Goal 1, PT)  maximum assist (25-49% patient effort);2 person assist  -DM     Time Frame (Transfer Goal 1, PT)  long term goal (LTG);1 week  -DM      Row Name 04/06/19 1448          Balance Goal 1 (PT)    Activity/Assistive Device (Balance Goal 1, PT)  sitting, static  -DM     [place] Level/Cues Needed (Balance Goal 1, PT)  minimum assist (75% or more patient effort)  -DM     Time Frame (Balance Goal 1, PT)  long term goal (LTG);1 week  -DM     Row Name 04/06/19 1448          Patient Education Goal (PT)    Activity (Patient Education Goal, PT)  HEP EXER  -DM     [place]/Cues/Accuracy (Memory Goal 2, PT)  demonstrates adequately  -DM     Time Frame (Patient Education Goal, PT)  long term goal (LTG);1 week  -DM     Row Name 04/06/19 1448          Positioning and Restraints    Pre-Treatment Position  in bed  -DM     Post Treatment Position  bed  -DM     In Bed  notified nsg;side lying left;call light within reach;encouraged to call for assist;exit alarm on;pillow between legs;waffle boots/both Propped w/ pillows   -DM       User Key  (r) = Recorded By, (t) = Taken By, (c) = Cosigned By    Initials Name Provider Type    Chloé Gillette, PT Physical Therapist        Physical Therapy Education     Title: PT OT SLP Therapies (In Progress)     Topic: Physical Therapy (In Progress)     Point: Mobility training (In Progress)     Learning Progress Summary           Patient Acceptance, E,D, NR by DM at 4/6/2019  4:25 PM                   Point: Home exercise program (In Progress)     Learning Progress Summary           Patient Acceptance, E,D, NR by DM at 4/6/2019  4:25 PM                   Point: Body mechanics (In Progress)     Learning Progress Summary           Patient Acceptance, E,D, NR by DM at 4/6/2019  4:25 PM                   Point: Precautions (In Progress)     Learning Progress Summary           Patient Acceptance, E,D, NR by DM at 4/6/2019  4:25 PM                               User Key     Initials Effective Dates Name Provider Type Discipline    NICOLE 06/19/15 -  Chloé Herrmann, PT Physical Therapist PT              PT Recommendation and  Plan  Anticipated Discharge Disposition (PT): skilled nursing facility  Planned Therapy Interventions (PT Eval): balance training, bed mobility training, home exercise program, patient/family education, strengthening, transfer training  Therapy Frequency (PT Clinical Impression): daily  Outcome Summary/Treatment Plan (PT)  Anticipated Discharge Disposition (PT): skilled nursing facility  Plan of Care Reviewed With: patient  Progress: improving  Outcome Summary: Presents w/ evolving sympt incl. dehy, lact.acid, elev BP (172/86 p/t PT), lethargy, abn.electrolytes,decr.focusing on task(unable to focus onLE's during ther.ex), underlying Dem.,decr.strength/endurance & impaired funct mobil;able to sixto rolling/bed mob,sitting activ.x4 min & LE ther.ex, but c/o B ankle pain & fatigue; recommend ret. to ECF at d/c    Outcome Measures     Row Name 04/06/19 1448 04/06/19 1352          How much help from another person do you currently need...    Turning from your back to your side while in flat bed without using bedrails?  1  -DM  --     Moving from lying on back to sitting on the side of a flat bed without bedrails?  1  -DM  --     Moving to and from a bed to a chair (including a wheelchair)?  1  -DM  --     Standing up from a chair using your arms (e.g., wheelchair, bedside chair)?  1  -DM  --     Climbing 3-5 steps with a railing?  1  -DM  --     To walk in hospital room?  1  -DM  --     AM-PAC 6 Clicks Score  6  -DM  --        How much help from another is currently needed...    Putting on and taking off regular lower body clothing?  --  1  -JR     Bathing (including washing, rinsing, and drying)  --  1  -JR     Toileting (which includes using toilet bed pan or urinal)  --  1  -JR     Putting on and taking off regular upper body clothing  --  1  -JR     Taking care of personal grooming (such as brushing teeth)  --  1  -JR     Eating meals  --  1  -JR     Score  --  6  -JR        Functional Assessment    Outcome Measure  Options  AM-PAC 6 Clicks Basic Mobility (PT)  -DM  AM-PAC 6 Clicks Daily Activity (OT)  -JR       User Key  (r) = Recorded By, (t) = Taken By, (c) = Cosigned By    Initials Name Provider Type    Chloé Gillette, PT Physical Therapist    JR Jacob, Patito JENKINS, OT Occupational Therapist         Time Calculation:   PT Charges     Row Name 04/06/19 1632 04/06/19 1448          Time Calculation    Start Time  1448  -DM  --     PT Received On  04/06/19  -DM  --     PT Goal Re-Cert Due Date  04/16/19  -DM  --        Time Calculation- PT    Total Timed Code Minutes- PT  35 minute(s)  -DM  --        Timed Charges    81999 - PT Therapeutic Exercise Minutes  --  19  -DM     55197 - PT Therapeutic Activity Minutes  --  16  -DM       User Key  (r) = Recorded By, (t) = Taken By, (c) = Cosigned By    Initials Name Provider Type    Chloé Gillette, PT Physical Therapist        Therapy Charges for Today     Code Description Service Date Service Provider Modifiers Qty    07502322977 HC PT THER PROC EA 15 MIN 4/6/2019 Chloé Herrmann, PT GP 1    42960017285 HC PT THERAPEUTIC ACT EA 15 MIN 4/6/2019 Chloé Herrmann, PT GP 1    61111198115 HC PT EVAL MOD COMPLEXITY 4 4/6/2019 Chloé Herrmann, PT GP 1          PT G-Codes  Outcome Measure Options: AM-PAC 6 Clicks Basic Mobility (PT)  AM-PAC 6 Clicks Score: 6  Score: 6      Chloé Herrmann, PT  4/6/2019

## 2019-04-06 NOTE — THERAPY RE-EVALUATION
Acute Care - Speech Language Pathology   Swallow Initial Evaluation [facility]   Clinical Swallow Re-Evaluation       Patient Name: Roro Guzman  : 1926  MRN: [id number]  Today's Date: 2019               Admit Date: 2019    Visit Dx:     ICD-10-CM ICD-9-CM   1. Sepsis due to urinary tract infection (CMS/HCC) A41.9 038.9    N39.0 995.91     599.0   2. Renal insufficiency N28.9 593.9   3. Dehydration E86.0 276.51   4. Respiratory alkalosis E87.3 276.3   5. Elevated serum lactate dehydrogenase R74.0 790.4   6. Altered mental status, unspecified altered mental status type R41.82 780.97     Patient Active Problem List   Diagnosis   • Sepsis due to urinary tract infection (CMS/HCC)   • Hypertension   • Diabetes mellitus (CMS/HCC)   • Depression   • Dementia   • TAMI (acute kidney injury) (CMS/HCC)   • Lactic acidosis   • Transaminitis     Past Medical History:   Diagnosis Date   • Anemia    • Clostridium difficile infection    • Dementia    • Depression    • Diabetes mellitus (CMS/HCC)    • GERD (gastroesophageal reflux disease)    • Hemorrhoid    • Hypertension    • PVD (peripheral vascular disease) (CMS/[facility])      History reviewed. No pertinent surgical history.     SWALLOW EVALUATION (last 72 hours)      SLP Adult Swallow Evaluation     Row Name 19 1030 19 1500                Rehab Evaluation    Document Type  --  evaluation  -AV       Subjective Information  --  no complaints  -AV       Patient Observations  --  alert;cooperative  -AV       Patient/Family Observations  --  son present   -AV       Patient Effort  --  good  -AV          General Information    Patient Profile Reviewed  --  yes  -AV       Pertinent History Of Current Problem  --  sepsis, cognitive decline recently at [facility]  -AV       Current Method of Nutrition  --  pureed;nectar/syrup-thick liquids  -AV       Precautions/Limitations, Vision  --  WFL;for purposes of eval  -AV       Precautions/Limitations, Hearing  --  WFL;for  purposes of eval  -AV       Prior Level of Function-Communication  --  cognitive-linguistic impairment  -AV       Prior Level of Function-Swallowing  --  nectar thick liquids;puree  -AV       Plans/Goals Discussed with  --  patient;family  -AV       Barriers to Rehab  --  cognitive status  -AV       Patient's Goals for Discharge  --  patient could not state  -AV       Family Goals for Discharge  --  patient able to return to PO diet  -AV          Pain Assessment    Additional Documentation  --  Pain Scale: FACES Pre/Post-Treatment (Group)  -AV          Pain Scale: FACES Pre/Post-Treatment    Pain: FACES Scale, Pretreatment  --  0-->no hurt  -AV       Pain: FACES Scale, Post-Treatment  --  0-->no hurt  -AV          Oral Motor and Function    Dentition Assessment  --  poor oral hygiene;missing teeth  -AV       Secretion Management  --  WNL/WFL  -AV       Mucosal Quality  --  dry  -AV       Volitional Swallow  --  WFL  -AV       Volitional Cough  --  WFL  -AV          Oral Musculature and Cranial Nerve Assessment    Oral Motor General Assessment  --  generalized oral motor weakness  -AV          General Eating/Swallowing Observations    Eating/Swallowing Skills  --  fed by SLP  -AV       Positioning During Eating  --  upright 90 degree;upright in bed  -AV       Utensils Used  --  spoon;cup;straw  -AV       Consistencies Trialed  --  pureed;thin liquids  -AV          Clinical Swallow Eval    Oral Prep Phase  impaired  -AV  impaired  -AV       Oral Transit  impaired  -AV  impaired  -AV       Oral Residue  WFL  -AV  WFL  -AV       Pharyngeal Phase  suspected pharyngeal impairment  -AV  no overt signs/symptoms of pharyngeal impairment  -AV       Esophageal Phase  unremarkable  -AV  unremarkable  -AV       Clinical Swallow Evaluation Summary  Inconsistent coughing with thins and inconsistent alertness levels. No overt s/s with nectar and puree. rec level 2 and puree and MBS monday if still inpattient   -AV  Clinical swallow  sindhu completed this pm:  patient placed on puree and nectar at SNF 2' cog change. Patient more alert today per family. Patient with no overt s/s with puree and thins.  Provided with adaptive cup. Did not trial solids at this time 2' concern for fatigue.  Rec puree and thins at this time.  will follow up with diet tolerance   -AV          Clinical Impression    SLP Swallowing Diagnosis  suspected pharyngeal dysfunction  -AV  suspected pharyngeal dysfunction  -AV       Functional Impact  risk of aspiration/pneumonia  -AV  risk of aspiration/pneumonia  -AV       Rehab Potential/Prognosis, Swallowing  adequate, monitor progress closely  -AV  adequate, monitor progress closely  -AV       Swallow Criteria for Skilled Therapeutic Interventions Met  demonstrates skilled criteria  -AV  demonstrates skilled criteria  -AV          Recommendations    Therapy Frequency (Swallow)  evaluation only  -AV  PRN  -AV       Predicted Duration Therapy Intervention (Days)  until discharge  -AV  until discharge  -AV       SLP Diet Recommendation  puree;nectar thick liquids  -AV  puree;thin liquids  -AV       Recommended Precautions and Strategies  small bites of food and sips of liquid;upright posture during/after eating  -AV  upright posture during/after eating;small bites of food and sips of liquid  -AV       SLP Rec. for Method of Medication Administration  meds whole;with thick liquids  -AV  meds whole;as tolerated  -AV       Monitor for Signs of Aspiration  yes;notify SLP if any concerns  -AV  yes;notify SLP if any concerns  -AV       Anticipated Dischage Disposition  skilled nursing facility  -AV  skilled nursing facility  -AV         User Key  (r) = Recorded By, (t) = Taken By, (c) = Cosigned By    Initials Name Effective Dates    AV Giovanna Rojas, MS CCC-SLP 04/03/18 -           EDUCATION  The patient has been educated in the following areas:   Dysphagia (Swallowing Impairment) Oral Care/Hydration.    SLP Recommendation and  Plan  SLP Swallowing Diagnosis: suspected pharyngeal dysfunction  SLP Diet Recommendation: puree, nectar thick liquids  Recommended Precautions and Strategies: small bites of food and sips of liquid, upright posture during/after eating     Monitor for Signs of Aspiration: yes, notify SLP if any concerns     Swallow Criteria for Skilled Therapeutic Interventions Met: demonstrates skilled criteria  Anticipated Dischage Disposition: skilled nursing facility  Rehab Potential/Prognosis, Swallowing: adequate, monitor progress closely  Therapy Frequency (Swallow): evaluation only  Predicted Duration Therapy Intervention (Days): until discharge       Plan of Care Reviewed With: patient  Plan of Care Review  Plan of Care Reviewed With: patient  Progress: no change    SLP GOALS     Row Name 04/05/19 1500             Oral Nutrition/Hydration Goal 1 (SLP)    Oral Nutrition/Hydration Goal 1, SLP  LTG:  Patient will tolerate LRD without s/s of pen/asp.   -AV      Time Frame (Oral Nutrition/Hydration Goal 1, SLP)  by discharge  -AV        User Key  (r) = Recorded By, (t) = Taken By, (c) = Cosigned By    Initials Name Provider Type    Giovanna Ferguson MS CCC-SLP Speech and Language Pathologist             Time Calculation:   Time Calculation- SLP     Row Name 04/06/19 1336             Time Calculation- SLP    SLP Start Time  1030  -AV      SLP Received On  04/06/19  -AV        User Key  (r) = Recorded By, (t) = Taken By, (c) = Cosigned By    Initials Name Provider Type    Giovanna Ferguson MS CCC-SLP Speech and Language Pathologist          Therapy Charges for Today     Code Description Service Date Service Provider Modifiers Qty    53502735212 HC ST EVAL ORAL PHARYNG SWALLOW 3 4/5/2019 Giovanna Rojas MS CCC-SLP GN 1    [id number] HC ST EVAL ORAL PHARYNG SWALLOW 3 4/6/2019 Giovanna Rojas MS CCC-SLP GN 1               Giovanna Rojas MS CCC-SLP  4/6/2019

## 2019-04-07 LAB
ALBUMIN SERPL-MCNC: 3 G/DL (ref 3.2–4.8)
ALBUMIN/GLOB SERPL: 1 G/DL (ref 1.5–2.5)
ALP SERPL-CCNC: 76 U/L (ref 25–100)
ALT SERPL W P-5'-P-CCNC: 70 U/L (ref 7–40)
ANION GAP SERPL CALCULATED.3IONS-SCNC: 5 MMOL/L (ref 3–11)
AST SERPL-CCNC: 47 U/L (ref 0–33)
BASOPHILS # BLD AUTO: 0.01 10*3/MM3 (ref 0–0.2)
BASOPHILS NFR BLD AUTO: 0.1 % (ref 0–1)
BILIRUB SERPL-MCNC: 0.4 MG/DL (ref 0.3–1.2)
BUN BLD-MCNC: 29 MG/DL (ref 9–23)
BUN/CREAT SERPL: 38.7 (ref 7–25)
CALCIUM SPEC-SCNC: 8 MG/DL (ref 8.7–10.4)
CHLORIDE SERPL-SCNC: 116 MMOL/L (ref 99–109)
CO2 SERPL-SCNC: 21 MMOL/L (ref 20–31)
CREAT BLD-MCNC: 0.75 MG/DL (ref 0.6–1.3)
DEPRECATED RDW RBC AUTO: 48.6 FL (ref 37–54)
EOSINOPHIL # BLD AUTO: 0.08 10*3/MM3 (ref 0–0.3)
EOSINOPHIL NFR BLD AUTO: 0.7 % (ref 0–3)
ERYTHROCYTE [DISTWIDTH] IN BLOOD BY AUTOMATED COUNT: 14.3 % (ref 11.3–14.5)
GFR SERPL CREATININE-BSD FRML MDRD: 72 ML/MIN/1.73
GLOBULIN UR ELPH-MCNC: 2.9 GM/DL
GLUCOSE BLD-MCNC: 110 MG/DL (ref 70–100)
HCT VFR BLD AUTO: 36.9 % (ref 34.5–44)
HGB BLD-MCNC: 11.8 G/DL (ref 11.5–15.5)
IMM GRANULOCYTES # BLD AUTO: 0.05 10*3/MM3 (ref 0–0.05)
IMM GRANULOCYTES NFR BLD AUTO: 0.4 % (ref 0–0.6)
LYMPHOCYTES # BLD AUTO: 1.56 10*3/MM3 (ref 0.6–4.8)
LYMPHOCYTES NFR BLD AUTO: 12.8 % (ref 24–44)
MAGNESIUM SERPL-MCNC: 2.4 MG/DL (ref 1.3–2.7)
MCH RBC QN AUTO: 29.9 PG (ref 27–31)
MCHC RBC AUTO-ENTMCNC: 32 G/DL (ref 32–36)
MCV RBC AUTO: 93.4 FL (ref 80–99)
MONOCYTES # BLD AUTO: 0.66 10*3/MM3 (ref 0–1)
MONOCYTES NFR BLD AUTO: 5.4 % (ref 0–12)
NEUTROPHILS # BLD AUTO: 9.81 10*3/MM3 (ref 1.5–8.3)
NEUTROPHILS NFR BLD AUTO: 80.6 % (ref 41–71)
PLATELET # BLD AUTO: 218 10*3/MM3 (ref 150–450)
PMV BLD AUTO: 9.7 FL (ref 6–12)
POTASSIUM BLD-SCNC: 3.4 MMOL/L (ref 3.5–5.5)
POTASSIUM BLD-SCNC: 3.4 MMOL/L (ref 3.5–5.5)
PROT SERPL-MCNC: 5.9 G/DL (ref 5.7–8.2)
RBC # BLD AUTO: 3.95 10*6/MM3 (ref 3.89–5.14)
SODIUM BLD-SCNC: 142 MMOL/L (ref 132–146)
WBC NRBC COR # BLD: 12.17 10*3/MM3 (ref 3.5–10.8)

## 2019-04-07 PROCEDURE — 84132 ASSAY OF SERUM POTASSIUM: CPT | Performed by: INTERNAL MEDICINE

## 2019-04-07 PROCEDURE — 85025 COMPLETE CBC W/AUTO DIFF WBC: CPT | Performed by: INTERNAL MEDICINE

## 2019-04-07 PROCEDURE — 25010000003 POTASSIUM CHLORIDE 10 MEQ/100ML SOLUTION: Performed by: NURSE PRACTITIONER

## 2019-04-07 PROCEDURE — 80053 COMPREHEN METABOLIC PANEL: CPT | Performed by: INTERNAL MEDICINE

## 2019-04-07 PROCEDURE — 83735 ASSAY OF MAGNESIUM: CPT | Performed by: INTERNAL MEDICINE

## 2019-04-07 PROCEDURE — 99233 SBSQ HOSP IP/OBS HIGH 50: CPT | Performed by: INTERNAL MEDICINE

## 2019-04-07 PROCEDURE — 25010000002 HEPARIN (PORCINE) PER 1000 UNITS: Performed by: INTERNAL MEDICINE

## 2019-04-07 RX ORDER — LABETALOL HYDROCHLORIDE 5 MG/ML
20 INJECTION, SOLUTION INTRAVENOUS
Status: DISCONTINUED | OUTPATIENT
Start: 2019-04-07 | End: 2019-04-11 | Stop reason: HOSPADM

## 2019-04-07 RX ORDER — NIFEDIPINE 10 MG/1
20 CAPSULE ORAL EVERY 8 HOURS SCHEDULED
Status: DISCONTINUED | OUTPATIENT
Start: 2019-04-07 | End: 2019-04-10

## 2019-04-07 RX ADMIN — FAMOTIDINE 20 MG: 20 TABLET ORAL at 08:51

## 2019-04-07 RX ADMIN — SODIUM CHLORIDE, PRESERVATIVE FREE 3 ML: 5 INJECTION INTRAVENOUS at 00:06

## 2019-04-07 RX ADMIN — ACETAMINOPHEN 650 MG: 325 TABLET, FILM COATED ORAL at 08:52

## 2019-04-07 RX ADMIN — VENLAFAXINE 37.5 MG: 37.5 TABLET ORAL at 18:22

## 2019-04-07 RX ADMIN — POTASSIUM CHLORIDE 40 MEQ: 1.5 POWDER, FOR SOLUTION ORAL at 20:20

## 2019-04-07 RX ADMIN — CEFEPIME HYDROCHLORIDE 1 G: 1 INJECTION, POWDER, FOR SOLUTION INTRAMUSCULAR; INTRAVENOUS at 23:34

## 2019-04-07 RX ADMIN — VENLAFAXINE 37.5 MG: 37.5 TABLET ORAL at 09:09

## 2019-04-07 RX ADMIN — SODIUM CHLORIDE 50 ML/HR: 900 INJECTION INTRAVENOUS at 00:05

## 2019-04-07 RX ADMIN — NYSTATIN 1 APPLICATION: 100000 POWDER TOPICAL at 09:08

## 2019-04-07 RX ADMIN — SODIUM CHLORIDE 50 ML/HR: 900 INJECTION INTRAVENOUS at 14:22

## 2019-04-07 RX ADMIN — LABETALOL 20 MG/4 ML (5 MG/ML) INTRAVENOUS SYRINGE 20 MG: at 11:04

## 2019-04-07 RX ADMIN — SODIUM CHLORIDE, PRESERVATIVE FREE 3 ML: 5 INJECTION INTRAVENOUS at 09:03

## 2019-04-07 RX ADMIN — HEPARIN SODIUM 5000 UNITS: 5000 INJECTION INTRAVENOUS; SUBCUTANEOUS at 13:40

## 2019-04-07 RX ADMIN — POTASSIUM CHLORIDE 10 MEQ: 7.46 INJECTION, SOLUTION INTRAVENOUS at 07:54

## 2019-04-07 RX ADMIN — CEFEPIME HYDROCHLORIDE 1 G: 1 INJECTION, POWDER, FOR SOLUTION INTRAMUSCULAR; INTRAVENOUS at 00:06

## 2019-04-07 RX ADMIN — NIFEDIPINE 20 MG: 10 CAPSULE, LIQUID FILLED ORAL at 14:34

## 2019-04-07 RX ADMIN — POTASSIUM CHLORIDE 10 MEQ: 7.46 INJECTION, SOLUTION INTRAVENOUS at 11:53

## 2019-04-07 RX ADMIN — AMLODIPINE BESYLATE 5 MG: 5 TABLET ORAL at 08:51

## 2019-04-07 RX ADMIN — HEPARIN SODIUM 5000 UNITS: 5000 INJECTION INTRAVENOUS; SUBCUTANEOUS at 05:51

## 2019-04-07 RX ADMIN — ACETAMINOPHEN 650 MG: 325 TABLET, FILM COATED ORAL at 20:20

## 2019-04-07 RX ADMIN — NIFEDIPINE 20 MG: 10 CAPSULE, LIQUID FILLED ORAL at 20:20

## 2019-04-07 RX ADMIN — POTASSIUM CHLORIDE 10 MEQ: 7.46 INJECTION, SOLUTION INTRAVENOUS at 09:01

## 2019-04-07 RX ADMIN — POTASSIUM CHLORIDE 10 MEQ: 7.46 INJECTION, SOLUTION INTRAVENOUS at 10:44

## 2019-04-07 RX ADMIN — HEPARIN SODIUM 5000 UNITS: 5000 INJECTION INTRAVENOUS; SUBCUTANEOUS at 20:21

## 2019-04-07 RX ADMIN — CEFEPIME HYDROCHLORIDE 1 G: 1 INJECTION, POWDER, FOR SOLUTION INTRAMUSCULAR; INTRAVENOUS at 13:40

## 2019-04-07 RX ADMIN — NYSTATIN 1 APPLICATION: 100000 POWDER TOPICAL at 20:22

## 2019-04-07 NOTE — PROGRESS NOTES
Discharge Planning Assessment  [facility]     Patient Name: Roro Guzman  MRN: [id number]  Today's Date: 4/7/2019    Admit Date: 4/5/2019    Discharge Needs Assessment     Row Name 04/07/19 1511       Living Environment    Lives With  facility resident    Current Living Arrangements  extended care facility    Primary Care Provided by  other (see comments)    Provides Primary Care For  no one, unable/limited ability to care for self    Family Caregiver if Needed  child(susan), adult    Quality of Family Relationships  helpful;involved;supportive    Able to Return to Prior Arrangements  yes       Resource/Environmental Concerns    Resource/Environmental Concerns  none    Transportation Concerns  other (see comments)       Transition Planning    Patient/Family Anticipated Services at Transition      Transportation Anticipated  health plan transportation       Discharge Needs Assessment    Readmission Within the Last 30 Days  no previous admission in last 30 days    Concerns to be Addressed  discharge planning    Equipment Currently Used at Home  wheelchair;walker, rolling    Anticipated Changes Related to Illness  none    Equipment Needed After Discharge  none    Outpatient/Agency/Support Group Needs  skilled nursing facility    Discharge Facility/Level of Care Needs  nursing facility, skilled        Discharge Plan     Row Name 04/07/19 1514       Plan    Plan  IDP    Patient/Family in Agreement with Plan  yes    Plan Comments  Pt is in LTC at [facility] (per son) - CM will need to verify this on Monday.  Pt is wheelchair and bed bound but is able to transfer w assistance.  Pt is confused at this time, son assisted w IDP.  Pt has MD that follows [facility] . [facility]  She will need [facility] at the time of transport.  May need PT/OT at  when d/c. Pt goal is to return to  when medically ready. CM will continue to follow.    Final Discharge Disposition Code  03 - skilled  nursing facility (SNF)        Destination      No service coordination in this encounter.      Durable Medical Equipment      No service coordination in this encounter.      Dialysis/Infusion      No service coordination in this encounter.      Home Medical Care      No service coordination in this encounter.      Therapy      No service coordination in this encounter.      Community Resources      No service coordination in this encounter.          Demographic Summary     Row Name 04/07/19 1510       General Information    Admission Type  inpatient    Arrived From  emergency department    Referral Source  admission list    Reason for Consult  discharge planning    Preferred Language  English       Contact Information    Permission Granted to Share Info With  ;family/designee    Contact Information Comments  Rui (son/EC) [phone]        Functional Status     Row Name 04/07/19 1510       Functional Status    Usual Activity Tolerance  poor       Functional Status, IADL    Medications  completely dependent    Meal Preparation  completely dependent    Housekeeping  completely dependent    Laundry  completely dependent    Shopping  completely dependent        Psychosocial    No documentation.       Abuse/Neglect    No documentation.       Legal    No documentation.       Substance Abuse    No documentation.       Patient Forms    No documentation.           Kelli Grimm RN

## 2019-04-07 NOTE — PLAN OF CARE
Problem: Skin Injury Risk (Adult)  Goal: Skin Health and Integrity  Outcome: Ongoing (interventions implemented as appropriate)   04/07/19 0451   Skin Injury Risk (Adult)   Skin Health and Integrity making progress toward outcome       Problem: Fall Risk (Adult)  Goal: Absence of Fall  Outcome: Ongoing (interventions implemented as appropriate)   04/07/19 0451   Fall Risk (Adult)   Absence of Fall making progress toward outcome       Problem: Patient Care Overview  Goal: Plan of Care Review  Outcome: Ongoing (interventions implemented as appropriate)   04/07/19 0451   Coping/Psychosocial   Plan of Care Reviewed With patient   Plan of Care Review   Progress no change   OTHER   Outcome Summary Oriented to person only, but is pleasant. VSS. IVF infusing. Moans occasionally but denies pain. No family at bedside although ICU nurse stated that son has been notified of transfer. Primafit in place; draining yellow urine. Waffle mattress on the bed.

## 2019-04-07 NOTE — PROGRESS NOTES
[facility] Medicine Services  PROGRESS NOTE    Patient Name: Roro Guzman  : 1926  MRN: [id number]    Date of Admission: 2019  Length of Stay: 2  Primary Care Physician: Maria Puentes APRN    Subjective   Subjective     CC:  Sepsis 2/2 UTI    HPI:  Patient transferred out of ICU to floor today. BP has been very elevated, to as high as >200 systolic this AM. Patient has no complaints, oriented to self only. No family present at bedside.     Review of Systems  Unable to assess given mental status    Otherwise ROS is negative except as mentioned in the HPI.    Objective   Objective     Vital Signs:   Temp:  [97 °F (36.1 °C)-98 °F (36.7 °C)] 97 °F (36.1 °C)  Heart Rate:  [68-96] 87  Resp:  [22-28] 28  BP: (145-223)/(69-94) 223/89        Physical Exam:  GEN- no acute distress noted, resting in bed, awake  HEENT- atraumatic, normocephlic, eomi  NECK- supple, trachea midline, no masses  RESP: ctab, normal effort  CV: no murmurs, s1/s2, rrr  MSK: no edema noted, spontaneous movement of all extremities  NEURO: alert, oriented, no focal deficits  SKIN: no rashes  PSYCH: pleasantly confused       Results Reviewed:  I have personally reviewed current lab, radiology, and data and agree.    Results from last 7 days   Lab Units 19  0343 04/06/19  0352 19  0858   WBC 10*3/mm3 12.17* 13.77* 12.20*   HEMOGLOBIN g/dL 11.8 13.0 16.6*   HEMATOCRIT % 36.9 42.1 51.8*   PLATELETS 10*3/mm3 218 284 361     Results from last 7 days   Lab Units 19  0343 19  1539 19  0352 19  0858   SODIUM mmol/L 142  --  146 147*   POTASSIUM mmol/L 3.4* 3.8 2.7* 3.9   CHLORIDE mmol/L 116*  --  115* 111*   CO2 mmol/L 21.0  --  21.0 24.0   BUN mg/dL 29*  --  30* 39*   CREATININE mg/dL 0.75  --  0.91 1.35*   GLUCOSE mg/dL 110*  --  143* 177*   CALCIUM mg/dL 8.0*  --  8.8 10.8*   ALT (SGPT) U/L 70*  --   --  86*   AST (SGOT) U/L 47*  --   --  66*     Estimated Creatinine Clearance:  41.4 mL/min (by C-G formula based on SCr of 0.75 mg/dL).    No results found for: BNP    Microbiology Results Abnormal     Procedure Component Value - Date/Time    Blood Culture - Blood, Arm, Right [[id number]] Collected:  04/05/19 0945    Lab Status:  Preliminary result Specimen:  Blood from Arm, Right Updated:  04/07/19 1030     Blood Culture No growth at 2 days    Blood Culture - Blood, Arm, Right [[id number]] Collected:  04/05/19 0950    Lab Status:  Preliminary result Specimen:  Blood from Arm, Right Updated:  04/07/19 1030     Blood Culture No growth at 2 days    Urine Culture - Urine, Urine, Catheter [[id number]]  (Abnormal)  (Susceptibility) Collected:  04/05/19 0858    Lab Status:  Preliminary result Specimen:  Urine, Catheter Updated:  04/07/19 0738     Urine Culture >100,000 CFU/mL Escherichia coli      >100,000 CFU/mL Gram Negative Bacilli    Susceptibility      Escherichia coli     DAR (Preliminary)     Ampicillin Susceptible     Ampicillin + Sulbactam Susceptible     Aztreonam Susceptible     Cefepime Susceptible     Cefotaxime Susceptible     Ceftriaxone Susceptible     Cefuroxime sodium Susceptible     Cephalothin Resistant     Ciprofloxacin Resistant     Ertapenem Susceptible     Gentamicin Susceptible     Levofloxacin Resistant     Meropenem Susceptible     Nitrofurantoin Susceptible     Piperacillin + Tazobactam Susceptible     Tetracycline Resistant     Tobramycin Susceptible     Trimethoprim + Sulfamethoxazole Susceptible                          Imaging Results (last 24 hours)     Procedure Component Value Units Date/Time    XR Chest 1 View [[id number]] Collected:  04/06/19 1351     Updated:  04/06/19 2258    Narrative:          EXAMINATION: XR CHEST 1 VW - 04/06/2019     INDICATION:  A41.9-Sepsis, unspecified organism; N39.0-Urinary tract  infection, site not specified; N28.9-Disorder of kidney and ureter,  unspecified; E86.0-Dehydration; E87.3-Alkalosis; R74.0-Nonspecific  elevation of levels of  transaminase and lactic acid dehydrogenase (ldh);  R41.82-Altered mental status, unspecified.      COMPARISON: 2019     FINDINGS: Patient again appears rotated to the right, apparently due to  marked levoconvex scoliosis. Heart shadow is mildly enlarged. The  vasculature appears normal. Lungs are moderately well-expanded and  appear clear. Old left-sided rib fractures and advanced shoulder joint  DJD are again noted.           Impression:       Stable chest exam with cardiomegaly but no evidence of  congestive failure. No new chest disease is identified.     DICTATED:   2019  EDITED/ls :   2019      This report was finalized on 2019 10:55 PM by DR. Earl Valadez MD.             Results for orders placed during the hospital encounter of 02/07/15   CONVERTED (HISTORICAL) ECHO    Narrative Patient:      SABRINA CABA    Med Rec#:     [id number]               :          1926            Date:         2015            Age:          88y                   Height:       162.56 cm / 64.0 in  Weight:       58.51 kg / 129.0 lbs  Sex:          F                     BSA:          1.62  Room#:        3347-1                    Sonographer:  Laurie Jolly RDCS, RDMS  Referring:    BELCASTROMARISA  Reading:      Cory Culver MD  Primary:      Chuckie Burt  Unit:         3 Main  ______________________________________________________________________    Transthoracic Echocardiogram    Indication:  ASYMPTOMATIC BRADYCARDIA  BP:           187/71    Conclusions  1. The study quality is technically difficult; all measurements are  approximate.   2. Global left ventricular wall motion and contractility are within  normal limits.  3. The left ventricle appears hyperdynamic.  4. There is an E to A reversal in the mitral valve flow pattern  suggestive of diastolic dysfunction.  5. The right ventricle is mildly dilated.   6. There is mild aortic regurgitation.   7. There is no evidence of mitral valve prolapse.  8.  No pulmonary hypertension is noted.  9. There is no evidence of pericardial effusion.  10. There is no dilatation of the aortic root.  11. The venous system appears normal.    Findings       Technical Comments:  The study quality is technically difficult.  The study is technically  limited due to poor acoustic windows.      Left Ventricle:  The left ventricular chamber size is normal. Global left ventricular  wall motion and contractility are within normal limits. The left  ventricle appears hyperdynamic. The estimated ejection fraction is  greater than 65%.  There is an E to A reversal in the mitral valve flow  pattern suggestive of diastolic dysfunction.     Left Atrium:  The left atrial chamber size is normal.     Right Ventricle:  The right ventricle is mildly dilated.  The right ventricular global  systolic function is normal.   Right Atrium:  The right atrial cavity size is normal. No atrial septal defect is  visualized.     Aortic Valve:  The aortic valve is trileaflet. The aortic valve leaflets are mildly  thickened. Mild aortic cusp sclerosis is present. There is aortic  annular calcification. There is mild aortic regurgitation.  There is no  evidence of aortic stenosis.     Mitral Valve:  The mitral valve leaflets appear normal. There is no evidence of mitral  valve prolapse. There is no evidence of mitral regurgitation. There is  no evidence of mitral stenosis.     Tricuspid Valve:  The tricuspid valve leaflets are normal.  There is a trace tricuspid  regurgitation.  No pulmonary hypertension is noted.     Pulmonic Valve:  The pulmonic valve appears normal. There is a trace pulmonic  regurgitation.      Pericardium:  There is no evidence of pericardial effusion.     Aorta:  There is no dilatation of the aortic root.     Pulmonary Artery:  The main pulmonary artery appears normal.     Venous:  The venous system appears normal.     Measurements   Chambers  Name                    Value           RVIDd (AP)  2D           2.9 cm          IVSd (2D)               1 cm            LVIDd (2D)              4 cm            LVIDs (2D)              2.2 cm          LVPWd (2D)              1 cm            EF (2D)                 80 %            Ao root diameter (2D)   2.7 cm          LA dimension (AP) 2D    3 cm            LA:Ao ratio (2D)        1.1 ratio         Diastolic/Systolic Function  Name                    Value           MV E-wave Vmax          0.43 m/sec      MV A-wave Vmax          0.81 m/sec      MV E:A ratio            0.5 ratio       LV septal e' Vmax       0.08 m/sec      LV lateral e' Vmax      0.05 m/sec      LV E:e' septal ratio    5.2 ratio       LV E:e' lateral ratio   8.3 ratio         Aortic Valve  Name                    Value           AV Vmax                 1.25 m/sec      AV peak gradient        6 mmHg          AR PHT                  768 msec        AR peak gradient        73 mmHg           Pulmonic Valve/Qp:Qs  Name                    Value           PV Vmax                 0.85 m/sec      PV peak gradient        3 mmHg          PV acceleration time    35 msec           Electronically signed by: Cory Culver MD on 02/09/2015 17:21:27       I have reviewed the medications:    Current Facility-Administered Medications:   •  acetaminophen (TYLENOL) tablet 650 mg, 650 mg, Oral, BID, Elaina Frost MD, 650 mg at 04/07/19 0852  •  artificial tears (LUBRIFRESH P.M.) ophthalmic ointment, , Topical, Q1H PRN, Elaina Frost MD  •  cefepime (MAXIPIME) 1 g/100 mL 0.9% NS IVPB (mbp), 1 g, Intravenous, Q12H, Vishal Garay MD, 1 g at 04/07/19 0006  •  famotidine (PEPCID) tablet 20 mg, 20 mg, Oral, Daily, Elaina Frost MD, 20 mg at 04/07/19 0851  •  heparin (porcine) 5000 UNIT/ML injection 5,000 Units, 5,000 Units, Subcutaneous, Q8H, Case, Porsha V., DO, 5,000 Units at 04/07/19 0551  •  labetalol (NORMODYNE,TRANDATE) injection 20 mg, 20 mg, Intravenous, Q10 Min PRN, Rosanne Rankin  MD  •  magnesium sulfate 4 gram infusion - Mg less than or equal to 1mg/dL, 4 g, Intravenous, PRN **OR** magnesium sulfate 3 gram infusion (1gm x 3) - Mg 1.1 - 1.5 mg/dL, 1 g, Intravenous, PRN **OR** Magnesium Sulfate 2 gram infusion- Mg 1.6 - 1.9 mg/dL, 2 g, Intravenous, PRN, Elaina Frost MD  •  NIFEdipine (PROCARDIA) capsule 20 mg, 20 mg, Oral, Q8H, Rosnane Rankin MD  •  nystatin (MYCOSTATIN) powder 1 application, 1 application, Topical, BID, Elaina Frost MD, 1 application at 04/07/19 0908  •  potassium chloride (MICRO-K) CR capsule 40 mEq, 40 mEq, Oral, PRN **OR** potassium chloride (KLOR-CON) packet 40 mEq, 40 mEq, Oral, PRN **OR** potassium chloride 10 mEq in 100 mL IVPB, 10 mEq, Intravenous, Q1H PRN, Elaina Frost MD  •  potassium chloride 10 mEq in 100 mL IVPB, 10 mEq, Intravenous, Q1H PRN, Armand Dumont, APRN, Last Rate: 100 mL/hr at 04/07/19 0901, 10 mEq at 04/07/19 0901  •  sodium chloride 0.9 % flush 10 mL, 10 mL, Intravenous, PRN, Tee Simons MD  •  sodium chloride 0.9 % flush 3 mL, 3 mL, Intravenous, Q12H, Case, Porsha V., DO, 3 mL at 04/07/19 0903  •  sodium chloride 0.9 % flush 3-10 mL, 3-10 mL, Intravenous, PRN, Case, Porsha V., DO  •  sodium chloride 0.9 % infusion, 50 mL/hr, Intravenous, Continuous, Elaina Frost MD, Last Rate: 50 mL/hr at 04/07/19 0005, 50 mL/hr at 04/07/19 0005  •  venlafaxine (EFFEXOR) tablet 37.5 mg, 37.5 mg, Oral, BID With Meals, Elaina Frost MD, 37.5 mg at 04/07/19 0909      Assessment/Plan   Assessment / Plan     Active Hospital Problems    Diagnosis POA   • **Sepsis due to urinary tract infection (CMS/[facility]) [A41.9, N39.0] Yes   • Hypertension [I10] Yes   • Diabetes mellitus (CMS/[facility]) [E11.9] Yes   • Depression [F32.9] Yes   • Dementia [F03.90] Yes   • TAMI (acute kidney injury) (CMS/[facility]) [N17.9] Yes   • Lactic acidosis [E87.2] Yes   • Transaminitis [R74.0] Yes          Brief Hospital Course to date:  Roro Guzman  is a 89-year-old woman hospitalized with sepsis secondary to recurrent urinary tract infection with associated lactic acidosis and renal insufficiency. Patient was initially admitted to the ICU for her care, and quickly improved with IV hydration as well as IV abx. Patient transferred to the floor 4/7 for further care.     HTN urgency  -- noted issues with uncontrolled BP during admission, home anti-HTN initially held but resumed on HD 2 (amlodipine), did require cardene gtt in ICU   -- this AM BP up >200 systolic, will trial prn IV labetalol and adjust amlodipine to nifedipine for better BP control; would ideally like to add ACE/ARB as patient with T2DM, though reported allergy, will attempt to clarify    Sepsis secondary to UTI  Lactic acidosis, resolved  -- urine culture growing 2 organisms- >100k Ecoli and >100k GNB not yet speciated   -- currently on cefepime, but given her history of resistant organisms (VRE UTI) will ask ID to see in the AM to assist     Hypokalemia  -- replace per protocol  -- Mg normal     TAMI, resolved  -- improved with IVF, continue to monitor, CMP in AM    Transaminitis  -- likely related to sepsis, improving, monitor       DVT Prophylaxis:  heparin    Disposition: I expect the patient to be discharged to NH facility pending improvement in BP and ultimate abx plan.    CODE STATUS:   Code Status and Medical Interventions:   Ordered at: 04/05/19 1104     Limited Support to NOT Include:    Intubation    Cardioversion/Defibrillation     Level Of Support Discussed With:    Health Care Surrogate     Code Status:    No CPR     Medical Interventions (Level of Support Prior to Arrest):    Limited         Electronically signed by Rosanne Rankin MD, 04/07/19, 10:40 AM.

## 2019-04-08 ENCOUNTER — APPOINTMENT (OUTPATIENT)
Dept: ULTRASOUND IMAGING | Facility: HOSPITAL | Age: 84
End: 2019-04-08

## 2019-04-08 ENCOUNTER — APPOINTMENT (OUTPATIENT)
Dept: GENERAL RADIOLOGY | Facility: HOSPITAL | Age: 84
End: 2019-04-08

## 2019-04-08 LAB
ALBUMIN SERPL-MCNC: 3.32 G/DL (ref 3.2–4.8)
ALBUMIN/GLOB SERPL: 1.1 G/DL (ref 1.5–2.5)
ALP SERPL-CCNC: 83 U/L (ref 25–100)
ALT SERPL W P-5'-P-CCNC: 58 U/L (ref 7–40)
ANION GAP SERPL CALCULATED.3IONS-SCNC: 8 MMOL/L (ref 3–11)
AST SERPL-CCNC: 33 U/L (ref 0–33)
BACTERIA SPEC AEROBE CULT: ABNORMAL
BACTERIA SPEC AEROBE CULT: ABNORMAL
BASOPHILS # BLD AUTO: 0.01 10*3/MM3 (ref 0–0.2)
BASOPHILS NFR BLD AUTO: 0.1 % (ref 0–1)
BILIRUB SERPL-MCNC: 0.6 MG/DL (ref 0.3–1.2)
BUN BLD-MCNC: 24 MG/DL (ref 9–23)
BUN/CREAT SERPL: 36.9 (ref 7–25)
CALCIUM SPEC-SCNC: 8.1 MG/DL (ref 8.7–10.4)
CHLORIDE SERPL-SCNC: 115 MMOL/L (ref 99–109)
CO2 SERPL-SCNC: 20 MMOL/L (ref 20–31)
CREAT BLD-MCNC: 0.65 MG/DL (ref 0.6–1.3)
DEPRECATED RDW RBC AUTO: 47.2 FL (ref 37–54)
EOSINOPHIL # BLD AUTO: 0.1 10*3/MM3 (ref 0–0.3)
EOSINOPHIL NFR BLD AUTO: 1.3 % (ref 0–3)
ERYTHROCYTE [DISTWIDTH] IN BLOOD BY AUTOMATED COUNT: 14.1 % (ref 11.3–14.5)
GFR SERPL CREATININE-BSD FRML MDRD: 85 ML/MIN/1.73
GLOBULIN UR ELPH-MCNC: 3.1 GM/DL
GLUCOSE BLD-MCNC: 90 MG/DL (ref 70–100)
HCT VFR BLD AUTO: 36.9 % (ref 34.5–44)
HGB BLD-MCNC: 12.3 G/DL (ref 11.5–15.5)
IMM GRANULOCYTES # BLD AUTO: 0.05 10*3/MM3 (ref 0–0.05)
IMM GRANULOCYTES NFR BLD AUTO: 0.6 % (ref 0–0.6)
LYMPHOCYTES # BLD AUTO: 1.18 10*3/MM3 (ref 0.6–4.8)
LYMPHOCYTES NFR BLD AUTO: 15 % (ref 24–44)
MCH RBC QN AUTO: 30.6 PG (ref 27–31)
MCHC RBC AUTO-ENTMCNC: 33.3 G/DL (ref 32–36)
MCV RBC AUTO: 91.8 FL (ref 80–99)
MONOCYTES # BLD AUTO: 0.51 10*3/MM3 (ref 0–1)
MONOCYTES NFR BLD AUTO: 6.5 % (ref 0–12)
NEUTROPHILS # BLD AUTO: 6 10*3/MM3 (ref 1.5–8.3)
NEUTROPHILS NFR BLD AUTO: 76.5 % (ref 41–71)
PLATELET # BLD AUTO: 214 10*3/MM3 (ref 150–450)
PMV BLD AUTO: 9.5 FL (ref 6–12)
POTASSIUM BLD-SCNC: 3.8 MMOL/L (ref 3.5–5.5)
PROT SERPL-MCNC: 6.4 G/DL (ref 5.7–8.2)
RBC # BLD AUTO: 4.02 10*6/MM3 (ref 3.89–5.14)
SODIUM BLD-SCNC: 143 MMOL/L (ref 132–146)
WBC NRBC COR # BLD: 7.85 10*3/MM3 (ref 3.5–10.8)

## 2019-04-08 PROCEDURE — 97110 THERAPEUTIC EXERCISES: CPT

## 2019-04-08 PROCEDURE — 99233 SBSQ HOSP IP/OBS HIGH 50: CPT | Performed by: INTERNAL MEDICINE

## 2019-04-08 PROCEDURE — 76775 US EXAM ABDO BACK WALL LIM: CPT

## 2019-04-08 PROCEDURE — 85025 COMPLETE CBC W/AUTO DIFF WBC: CPT | Performed by: INTERNAL MEDICINE

## 2019-04-08 PROCEDURE — 92611 MOTION FLUOROSCOPY/SWALLOW: CPT

## 2019-04-08 PROCEDURE — 74230 X-RAY XM SWLNG FUNCJ C+: CPT

## 2019-04-08 PROCEDURE — 80053 COMPREHEN METABOLIC PANEL: CPT | Performed by: INTERNAL MEDICINE

## 2019-04-08 PROCEDURE — 25010000002 HEPARIN (PORCINE) PER 1000 UNITS: Performed by: INTERNAL MEDICINE

## 2019-04-08 RX ORDER — CARVEDILOL 12.5 MG/1
12.5 TABLET ORAL EVERY 12 HOURS SCHEDULED
Status: DISCONTINUED | OUTPATIENT
Start: 2019-04-08 | End: 2019-04-11 | Stop reason: HOSPADM

## 2019-04-08 RX ADMIN — HEPARIN SODIUM 5000 UNITS: 5000 INJECTION INTRAVENOUS; SUBCUTANEOUS at 22:28

## 2019-04-08 RX ADMIN — BARIUM SULFATE 20 ML: 400 PASTE ORAL at 11:03

## 2019-04-08 RX ADMIN — POTASSIUM CHLORIDE 40 MEQ: 1.5 POWDER, FOR SOLUTION ORAL at 01:04

## 2019-04-08 RX ADMIN — BARIUM SULFATE 100 ML: 0.81 POWDER, FOR SUSPENSION ORAL at 11:03

## 2019-04-08 RX ADMIN — CARVEDILOL 12.5 MG: 12.5 TABLET, FILM COATED ORAL at 22:29

## 2019-04-08 RX ADMIN — HEPARIN SODIUM 5000 UNITS: 5000 INJECTION INTRAVENOUS; SUBCUTANEOUS at 05:32

## 2019-04-08 RX ADMIN — NIFEDIPINE 20 MG: 10 CAPSULE, LIQUID FILLED ORAL at 16:08

## 2019-04-08 RX ADMIN — ACETAMINOPHEN 650 MG: 325 TABLET, FILM COATED ORAL at 08:04

## 2019-04-08 RX ADMIN — CARVEDILOL 12.5 MG: 12.5 TABLET, FILM COATED ORAL at 12:50

## 2019-04-08 RX ADMIN — NYSTATIN 1 APPLICATION: 100000 POWDER TOPICAL at 08:05

## 2019-04-08 RX ADMIN — VENLAFAXINE 37.5 MG: 37.5 TABLET ORAL at 08:05

## 2019-04-08 RX ADMIN — CEFEPIME HYDROCHLORIDE 1 G: 1 INJECTION, POWDER, FOR SOLUTION INTRAMUSCULAR; INTRAVENOUS at 12:51

## 2019-04-08 RX ADMIN — ACETAMINOPHEN 650 MG: 325 TABLET, FILM COATED ORAL at 22:29

## 2019-04-08 RX ADMIN — NIFEDIPINE 20 MG: 10 CAPSULE, LIQUID FILLED ORAL at 22:29

## 2019-04-08 RX ADMIN — NYSTATIN 1 APPLICATION: 100000 POWDER TOPICAL at 22:28

## 2019-04-08 RX ADMIN — FAMOTIDINE 20 MG: 20 TABLET ORAL at 08:05

## 2019-04-08 RX ADMIN — NIFEDIPINE 20 MG: 10 CAPSULE, LIQUID FILLED ORAL at 05:32

## 2019-04-08 RX ADMIN — BARIUM SULFATE 50 ML: 400 SUSPENSION ORAL at 11:03

## 2019-04-08 RX ADMIN — HEPARIN SODIUM 5000 UNITS: 5000 INJECTION INTRAVENOUS; SUBCUTANEOUS at 16:07

## 2019-04-08 NOTE — PROGRESS NOTES
"                  Clinical Nutrition     Nutrition Assessment  Reason for Visit:   Follow-up protocol      Patient Name: Roro Guzman  YOB: 1926  MRN: [id number]  Date of Encounter: 04/08/19 1:52 PM  Admission date: 4/5/2019    Comment: NSG Tech who has fed her rpt she is doing better, taking bites of food and some supplement.      Adjusted suppl to one with higher Kcal, offered trial of very high calorie supplement.   Will perform physical exam for malnutrition assessment as feasible     Nutrition Assessment     Admission diagnosis     Sepsis due to urinary tract infection (CMS/HCC)    Additional applicable diagnosis/conditions/procedures this adm   AMS  TAMI  Lactic acidosis  Suspected pharyngeal dysfunction    (4/6) S/p SLP evaluation at bedside: SLP Diet Recommendation: puree, nectar thick liquids      Applicable PMH:  Hypertension  Diabetes mellitus (CMS/HCC)  Depression  Dementia  PVD  CDiff  GERD    Pt is a resident at [facility]    Reported/Observed/Food/Nutrition Related History:     Pt sleeping at time of visit. Son believes has been wt loss but unable to quantify. Per records at her residence, [facility], pt was 123 lbs on March 12. Son allows she has not been eating normally with this illness. NSG Tech who has fed her rpt she is doing better, taking bites of food and some supplement.          Anthropometrics     Height: 165.1 cm (65\")  Last filed wt: Weight: 59.4 kg (131 lb) (04/08/19 0700)  Weight Method: Bed scale    BMI: BMI (Calculated): 18.3  Underweight:<18.5kg/m2    Ideal Body Weight (IBW) (kg): 57.29    Weight Change   UBW: 128lb, 123 lb on March 12 per residence records.   Weight change: ? current bed wt of 131 lbs (note bed wt of 110 lbs when arrived prior to IV hydration)       Labs reviewed     Results from last 7 days   Lab Units 04/08/19  0331 04/07/19  1707 04/07/19  0343  04/06/19  0352 04/05/19  0858   GLUCOSE mg/dL 90  --  110*  --  143* 177*   BUN mg/dL 24*  --  " "29*  --  30* 39*   CREATININE mg/dL 0.65  --  0.75  --  0.91 1.35*   SODIUM mmol/L 143  --  142  --  146 147*   CHLORIDE mmol/L 115*  --  116*  --  115* 111*   POTASSIUM mmol/L 3.8 3.4* 3.4*   < > 2.7* 3.9   MAGNESIUM mg/dL  --   --  2.4  --  1.9  --    ALT (SGPT) U/L 58*  --  70*  --   --  86*    < > = values in this interval not displayed.     Results from last 7 days   Lab Units 04/08/19  0331 04/07/19  0343 04/05/19  0858   ALBUMIN g/dL 3.32 3.00* 4.41             No results found for: HGBA1C      Medications reviewed   Pertinent:  Pepcid Abx; MgSO4, KCl given today    Current Nutrition Prescription     PO: Diet Dysphagia; II - Pureed; Thin; Other; small bite/sips   Supplement: Boost Glucose Control 3x/da  Adjusted today to Boost Plus with trial of Boost VHC    Intake: took up to 25% of a few meals, mostly \"bites\", containers of applesauce and supplement.       Nutrition Diagnosis     4/6, 4/8  Problem Predicted suboptimal energy intake   Etiology AMS and decr appetite w UTI   Signs/Symptoms Poor intake per report      Goal:   General: Nutrition support treatment  PO: Establish PO, Increase intake    Nutrition Intervention    Follow treatment progress, Care plan reviewed, Advise alternate selection, supplement adjusted      Monitoring/Evaluation:   PO intake, Supplement intake, Weight perform physical exam for malnutrition assessment as feasible      Will Continue to follow per protocol      Kaylie Guillory RD  Time Spent: 30min      "

## 2019-04-08 NOTE — NURSING NOTE
5 IV attempts, per charge nurse Stevie CARDONA and Diamond RN. Unsuccessful,  Jos notified and will come up to attempt.   Pt took multiple attempts to take PO blood pressure medication crushed in apple sauce will recheck BP shortly to see effect.

## 2019-04-08 NOTE — PROGRESS NOTES
[facility] Medicine Services  PROGRESS NOTE    Patient Name: Roro Guzman  : 1926  MRN: [id number]    Date of Admission: 2019  Length of Stay: 3  Primary Care Physician: Maria Puentes APRN    Subjective   Subjective     CC:  Sepsis 2/2 UTI    HPI:  No overnight events. BP remains high. IV access lost this morning, trying to regain access.     Review of Systems  Unable to assess given mental status    Otherwise ROS is negative except as mentioned in the HPI.    Objective   Objective     Vital Signs:   Temp:  [98.1 °F (36.7 °C)-98.7 °F (37.1 °C)] 98.2 °F (36.8 °C)  Heart Rate:  [63-79] 79  Resp:  [14-18] 18  BP: (143-196)/(67-82) 196/79        Physical Exam:  GEN- no acute distress noted, resting in bed, awake  HEENT- atraumatic, normocephlic, eomi  NECK- supple, trachea midline, no masses  RESP: ctab, normal effort  CV: no murmurs, s1/s2, rrr  MSK: no edema noted, spontaneous movement of all extremities  NEURO: alert, oriented, no focal deficits  SKIN: no rashes  PSYCH: pleasantly confused       Results Reviewed:  I have personally reviewed current lab, radiology, and data and agree.    Results from last 7 days   Lab Units 19  03319  0343 19  0352   WBC 10*3/mm3 7.85 12.17* 13.77*   HEMOGLOBIN g/dL 12.3 11.8 13.0   HEMATOCRIT % 36.9 36.9 42.1   PLATELETS 10*3/mm3 214 218 284     Results from last 7 days   Lab Units 19  0331 19  1707 19  0343  19  0352 19  0858   SODIUM mmol/L 143  --  142  --  146 147*   POTASSIUM mmol/L 3.8 3.4* 3.4*   < > 2.7* 3.9   CHLORIDE mmol/L 115*  --  116*  --  115* 111*   CO2 mmol/L 20.0  --  21.0  --  21.0 24.0   BUN mg/dL 24*  --  29*  --  30* 39*   CREATININE mg/dL 0.65  --  0.75  --  0.91 1.35*   GLUCOSE mg/dL 90  --  110*  --  143* 177*   CALCIUM mg/dL 8.1*  --  8.0*  --  8.8 10.8*   ALT (SGPT) U/L 58*  --  70*  --   --  86*   AST (SGOT) U/L 33  --  47*  --   --  66*    < > = values in this  interval not displayed.     Estimated Creatinine Clearance: 42.1 mL/min (by C-G formula based on SCr of 0.65 mg/dL).    No results found for: BNP    Microbiology Results Abnormal     Procedure Component Value - Date/Time    Urine Culture - Urine, Urine, Catheter [[id number]]  (Abnormal)  (Susceptibility) Collected:  04/05/19 0858    Lab Status:  Final result Specimen:  Urine, Catheter Updated:  04/08/19 1139     Urine Culture >100,000 CFU/mL Escherichia coli      >100,000 CFU/mL Proteus mirabilis    Susceptibility      Escherichia coli     DAR     Ampicillin Susceptible     Ampicillin + Sulbactam Susceptible     Aztreonam Susceptible     Cefepime Susceptible     Cefotaxime Susceptible     Ceftriaxone Susceptible     Cefuroxime sodium Susceptible     Cephalothin Resistant     Ciprofloxacin Resistant     Ertapenem Susceptible     Gentamicin Susceptible     Levofloxacin Resistant     Meropenem Susceptible     Nitrofurantoin Susceptible     Piperacillin + Tazobactam Susceptible     Tetracycline Resistant     Tobramycin Susceptible     Trimethoprim + Sulfamethoxazole Susceptible                Susceptibility      Proteus mirabilis     DAR     Ampicillin Resistant     Ampicillin + Sulbactam Susceptible     Aztreonam Susceptible     Cefepime Susceptible     Cefotaxime Susceptible     Ceftriaxone Susceptible     Cefuroxime sodium Susceptible     Cephalothin Susceptible     Ciprofloxacin Resistant     Ertapenem Susceptible     Gentamicin Susceptible     Levofloxacin Intermediate     Meropenem Susceptible     Piperacillin + Tazobactam Susceptible     Tobramycin Susceptible     Trimethoprim + Sulfamethoxazole Resistant                    Blood Culture - Blood, Arm, Right [[id number]] Collected:  04/05/19 0945    Lab Status:  Preliminary result Specimen:  Blood from Arm, Right Updated:  04/08/19 1030     Blood Culture No growth at 3 days    Blood Culture - Blood, Arm, Right [[id number]] Collected:  04/05/19 0950    Lab Status:   Preliminary result Specimen:  Blood from Arm, Right Updated:  19 1030     Blood Culture No growth at 3 days          Imaging Results (last 24 hours)     Procedure Component Value Units Date/Time    FL Video Swallow With Speech [[id number]] Updated:  19 1104          Results for orders placed during the hospital encounter of 02/07/15   CONVERTED (HISTORICAL) ECHO    Narrative Patient:      SABRINA CABA    [facility] Rec#:     [id number]               :          1926            Date:         2015            Age:          88y                   Height:       162.56 cm / 64.0 in  Weight:       58.51 kg / 129.0 lbs  Sex:          F                     BSA:          1.62  Room#:        [facility]                    Sonographer:  Laurie Jolly RDCS, RDMS  Referring:    LIZETH  Reading:      Cory Culver MD  Primary:      Chuckie Burt  Unit:         3 Main  ______________________________________________________________________    Transthoracic Echocardiogram    Indication:  ASYMPTOMATIC BRADYCARDIA  BP:           187/71    Conclusions  1. The study quality is technically difficult; all measurements are  approximate.   2. Global left ventricular wall motion and contractility are within  normal limits.  3. The left ventricle appears hyperdynamic.  4. There is an E to A reversal in the mitral valve flow pattern  suggestive of diastolic dysfunction.  5. The right ventricle is mildly dilated.   6. There is mild aortic regurgitation.   7. There is no evidence of mitral valve prolapse.  8. No pulmonary hypertension is noted.  9. There is no evidence of pericardial effusion.  10. There is no dilatation of the aortic root.  11. The venous system appears normal.    Findings       Technical Comments:  The study quality is technically difficult.  The study is technically  limited due to poor acoustic windows.      Left Ventricle:  The left ventricular chamber size is normal. Global left ventricular  wall motion and  contractility are within normal limits. The left  ventricle appears hyperdynamic. The estimated ejection fraction is  greater than 65%.  There is an E to A reversal in the mitral valve flow  pattern suggestive of diastolic dysfunction.     Left Atrium:  The left atrial chamber size is normal.     Right Ventricle:  The right ventricle is mildly dilated.  The right ventricular global  systolic function is normal.   Right Atrium:  The right atrial cavity size is normal. No atrial septal defect is  visualized.     Aortic Valve:  The aortic valve is trileaflet. The aortic valve leaflets are mildly  thickened. Mild aortic cusp sclerosis is present. There is aortic  annular calcification. There is mild aortic regurgitation.  There is no  evidence of aortic stenosis.     Mitral Valve:  The mitral valve leaflets appear normal. There is no evidence of mitral  valve prolapse. There is no evidence of mitral regurgitation. There is  no evidence of mitral stenosis.     Tricuspid Valve:  The tricuspid valve leaflets are normal.  There is a trace tricuspid  regurgitation.  No pulmonary hypertension is noted.     Pulmonic Valve:  The pulmonic valve appears normal. There is a trace pulmonic  regurgitation.      Pericardium:  There is no evidence of pericardial effusion.     Aorta:  There is no dilatation of the aortic root.     Pulmonary Artery:  The main pulmonary artery appears normal.     Venous:  The venous system appears normal.     Measurements   Chambers  Name                    Value           RVIDd (AP) 2D           2.9 cm          IVSd (2D)               1 cm            LVIDd (2D)              4 cm            LVIDs (2D)              2.2 cm          LVPWd (2D)              1 cm            EF (2D)                 80 %            Ao root diameter (2D)   2.7 cm          LA dimension (AP) 2D    3 cm            LA:Ao ratio (2D)        1.1 ratio         Diastolic/Systolic Function  Name                    Value           MV E-wave  Vmax          0.43 m/sec      MV A-wave Vmax          0.81 m/sec      MV E:A ratio            0.5 ratio       LV septal e' Vmax       0.08 m/sec      LV lateral e' Vmax      0.05 m/sec      LV E:e' septal ratio    5.2 ratio       LV E:e' lateral ratio   8.3 ratio         Aortic Valve  Name                    Value           AV Vmax                 1.25 m/sec      AV peak gradient        6 mmHg          AR PHT                  768 msec        AR peak gradient        73 mmHg           Pulmonic Valve/Qp:Qs  Name                    Value           PV Vmax                 0.85 m/sec      PV peak gradient        3 mmHg          PV acceleration time    35 msec           Electronically signed by: Cory Culver MD on 02/09/2015 17:21:27       I have reviewed the medications:    Current Facility-Administered Medications:   •  acetaminophen (TYLENOL) tablet 650 mg, 650 mg, Oral, BID, Elaina Frost MD, 650 mg at 04/08/19 0804  •  artificial tears (LUBRIFRESH P.M.) ophthalmic ointment, , Topical, Q1H PRN, Elaina Frost MD  •  carvedilol (COREG) tablet 12.5 mg, 12.5 mg, Oral, Q12H, Rosanne Rankin MD  •  cefepime (MAXIPIME) 1 g/100 mL 0.9% NS IVPB (mbp), 1 g, Intravenous, Q12H, Vishal Garay MD, 1 g at 04/07/19 2334  •  famotidine (PEPCID) tablet 20 mg, 20 mg, Oral, Daily, Elaina Frost MD, 20 mg at 04/08/19 0805  •  heparin (porcine) 5000 UNIT/ML injection 5,000 Units, 5,000 Units, Subcutaneous, Q8H, Case, Porsha V., DO, 5,000 Units at 04/08/19 0532  •  labetalol (NORMODYNE,TRANDATE) injection 20 mg, 20 mg, Intravenous, Q10 Min PRN, Rosanne Rankin MD, 20 mg at 04/07/19 1104  •  magnesium sulfate 4 gram infusion - Mg less than or equal to 1mg/dL, 4 g, Intravenous, PRN **OR** magnesium sulfate 3 gram infusion (1gm x 3) - Mg 1.1 - 1.5 mg/dL, 1 g, Intravenous, PRN **OR** Magnesium Sulfate 2 gram infusion- Mg 1.6 - 1.9 mg/dL, 2 g, Intravenous, PRN, Elaina Frost MD  •  NIFEdipine  (PROCARDIA) capsule 20 mg, 20 mg, Oral, Q8H, Rosanne Rankin MD, 20 mg at 04/08/19 0532  •  nystatin (MYCOSTATIN) powder 1 application, 1 application, Topical, BID, Elaina Frost MD, 1 application at 04/08/19 0805  •  potassium chloride (MICRO-K) CR capsule 40 mEq, 40 mEq, Oral, PRN **OR** potassium chloride (KLOR-CON) packet 40 mEq, 40 mEq, Oral, PRN, 40 mEq at 04/08/19 0104 **OR** potassium chloride 10 mEq in 100 mL IVPB, 10 mEq, Intravenous, Q1H PRN, Elaina Frost MD  •  potassium chloride 10 mEq in 100 mL IVPB, 10 mEq, Intravenous, Q1H PRN, Armand Dumont, APRN, Last Rate: 100 mL/hr at 04/07/19 1153, 10 mEq at 04/07/19 1153  •  sodium chloride 0.9 % flush 10 mL, 10 mL, Intravenous, PRN, Tee Simons MD  •  sodium chloride 0.9 % flush 3 mL, 3 mL, Intravenous, Q12H, Case, Porsha V., DO, 3 mL at 04/07/19 0903  •  sodium chloride 0.9 % flush 3-10 mL, 3-10 mL, Intravenous, PRN, Case, Porsha V., DO  •  sodium chloride 0.9 % infusion, 50 mL/hr, Intravenous, Continuous, Elaina Frost MD, Last Rate: 50 mL/hr at 04/07/19 1422, 50 mL/hr at 04/07/19 1422  •  venlafaxine (EFFEXOR) tablet 37.5 mg, 37.5 mg, Oral, BID With Meals, Elaina Frost MD, 37.5 mg at 04/08/19 0805      Assessment/Plan   Assessment / Plan     Active Hospital Problems    Diagnosis POA   • **Sepsis due to urinary tract infection (CMS/HCC) [A41.9, N39.0] Yes   • Hypertension [I10] Yes   • Diabetes mellitus (CMS/HCC) [E11.9] Yes   • Depression [F32.9] Yes   • Dementia [F03.90] Yes   • TAMI (acute kidney injury) (CMS/HCC) [N17.9] Yes   • Lactic acidosis [E87.2] Yes   • Transaminitis [R74.0] Yes          Brief Hospital Course to date:  Roro Guzman is a 89-year-old woman hospitalized with sepsis secondary to recurrent urinary tract infection with associated lactic acidosis and renal insufficiency. Patient was initially admitted to the ICU for her care, and quickly improved with IV hydration as well as IV abx.  Patient transferred to the floor 4/7 for further care.     HTN urgency  -- noted issues with uncontrolled BP during admission, home anti-HTN initially held but resumed on HD 2 (amlodipine), did require cardene gtt in ICU   -- BP continues to be elevated; difficulty with IV access, currently titrating medications-- will add coreg today     Sepsis secondary to UTI  Lactic acidosis, resolved  -- urine culture growing 2 organisms- >100k Ecoli and >100k Proteus   -- currently on cefepime, ID now following    Hypokalemia  -- replace per protocol  -- Mg normal     TAMI, resolved  -- improved with IVF, continue to monitor, CMP in AM    Transaminitis  -- likely related to sepsis, improving, monitor       DVT Prophylaxis:  heparin    Disposition: I expect the patient to be discharged to NH facility pending improvement in BP and ultimate abx plan.    CODE STATUS:   Code Status and Medical Interventions:   Ordered at: 04/05/19 1104     Limited Support to NOT Include:    Intubation    Cardioversion/Defibrillation     Level Of Support Discussed With:    Health Care Surrogate     Code Status:    No CPR     Medical Interventions (Level of Support Prior to Arrest):    Limited         Electronically signed by Rosanne Rankin MD, 04/08/19, 12:44 PM.

## 2019-04-08 NOTE — PLAN OF CARE
Problem: Patient Care Overview  Goal: Plan of Care Review  Outcome: Ongoing (interventions implemented as appropriate)   04/08/19 6667   Coping/Psychosocial   Plan of Care Reviewed With patient;son   Plan of Care Review   Progress improving   OTHER   Outcome Summary Pt awake and alert, son in room. Pt and pt's son agreeable with bed level exercises but pt's son stated pt cannot tolerate transfers this date. LE exercises completed, AAROM with heel slides and ankle pumps but PROM with remaining LE TE. Pt inconsistently following commands. Cont to progress as tolerated.

## 2019-04-08 NOTE — PLAN OF CARE
Problem: Patient Care Overview  Goal: Plan of Care Review  Outcome: Ongoing (interventions implemented as appropriate)   04/08/19 1045   Coping/Psychosocial   Plan of Care Reviewed With patient   Plan of Care Review   Progress improving   SLP MBS evaluation completed. Will address moderate oral dysphagia and mild pharyngeal dysphagia w/ f/u for diet tolerance and further education PRN. Please see note for further details and recommendations.

## 2019-04-08 NOTE — MBS/VFSS/FEES
Acute Care - Speech Language Pathology   Swallow Initial Evaluation  [place]   Modified Barium Swallow Study (MBS)     Patient Name: Roro Guzman  : 1926  MRN: [id number]  Today's Date: 2019  Onset of Illness/Injury or Date of Surgery: 19     Referring Physician: YODIT Garay MD      Admit Date: 2019    Visit Dx:     ICD-10-CM ICD-9-CM   1. Sepsis due to urinary tract infection (CMS/HCC) A41.9 038.9    N39.0 995.91     599.0   2. Renal insufficiency N28.9 593.9   3. Dehydration E86.0 276.51   4. Respiratory alkalosis E87.3 276.3   5. Elevated serum lactate dehydrogenase R74.0 790.4   6. Altered mental status, unspecified altered mental status type R41.82 780.97   7. Impaired mobility and ADLs Z74.09 799.89   8. Impaired functional mobility, balance, gait, and endurance Z74.09 V49.89   9. Oropharyngeal dysphagia R13.12 787.22     Patient Active Problem List   Diagnosis   • Sepsis due to urinary tract infection (CMS/HCC)   • Hypertension   • Diabetes mellitus (CMS/HCC)   • Depression   • Dementia   • TAMI (acute kidney injury) (CMS/HCC)   • Lactic acidosis   • Transaminitis     Past Medical History:   Diagnosis Date   • Anemia    • Clostridium difficile infection    • Dementia    • Depression    • Diabetes mellitus (CMS/HCC)    • GERD (gastroesophageal reflux disease)    • Hemorrhoid    • Hypertension    • PVD (peripheral vascular disease) (CMS/[facility])      History reviewed. No pertinent surgical history.     SWALLOW EVALUATION (last 72 hours)      SLP Adult Swallow Evaluation     Row Name 19 1045             Rehab Evaluation    Document Type  evaluation  -RD      Subjective Information  no complaints  -RD      Patient Observations  alert;cooperative;agree to therapy  -RD      Patient/Family Observations  No family present  -RD      Patient Effort  good  -RD         General Information    Patient Profile Reviewed  yes  -RD      Pertinent History Of Current Problem  Sespis 2' UTI. DM,  depression, recent cognitive decline at SNF. Dementia, TAMI.   -RD      Current Method of Nutrition  pureed;nectar/syrup-thick liquids  -RD      Precautions/Limitations, Vision  WFL;for purposes of eval  -RD      Precautions/Limitations, Hearing  WFL;for purposes of eval  -RD      Prior Level of Function-Communication  cognitive-linguistic impairment;other (see comments) baseline dementia  -RD      Prior Level of Function-Swallowing  nectar thick liquids;puree  -RD      Plans/Goals Discussed with  patient;agreed upon  -RD      Barriers to Rehab  cognitive status  -RD      Patient's Goals for Discharge  patient did not state  -RD      Family Goals for Discharge  --         Pain Assessment    Additional Documentation  Pain Scale: Numbers Pre/Post-Treatment (Group)  -RD         Pain Scale: Numbers Pre/Post-Treatment    Pain Scale: Numbers, Pretreatment  0/10 - no pain  -RD      Pain Scale: Numbers, Post-Treatment  0/10 - no pain  -RD         MBS/VFSS    Utensils Used  spoon;cup;straw  -RD      Consistencies Trialed  thin liquids;nectar/syrup-thick liquids;pudding thick DNT solid  -RD         MBS/VFSS Interpretation    Oral Prep Phase  impaired oral phase of swallowing  -RD      Oral Transit Phase  impaired  -RD      Oral Residue  impaired  -RD      Oral Phase, Comment  Moderate oral dysphagia. Incr'd prep and A-P transit time w/ all consistencies. Prolonged manipulation w/ pudding. DNT solid 2' oral deficits. Pre-spill w/ thins 2' reduced lingual control. Mild-moderate oral residue w/ thins, nectar-thick, and pureed consistencies that did not fully clear, but reduced when alternating pureed/liquids. Some oral holding noted t/o eval 2' cognition.   -RD         Oral Preparatory Phase    Oral Preparatory Phase  prolonged manipulation;oral holding  -RD      Oral Holding  pudding/puree;secondary to impaired cognitive status  -RD      Prolonged Manipulation  all consistencies tested;secondary to reduced lingual  strength;secondary to reduced lingual range of motion;secondary to impaired cognitive status  -RD         Oral Transit Phase    Impaired Oral Transit Phase  increased A-P transit time;premature spillage of liquids into pharynx  -RD      Increased A-P Transit Time  thin liquids;nectar-thick liquids;pudding/puree;secondary to reduced lingual control  -RD      Premature Spillage of Liquids into Pharynx  thin liquids;secondary to reduced lingual control  -RD         Oral Residue    Impaired Oral Residue  diffuse residue throughout oral cavity  -RD      Diffuse Residue throughout Oral Cavity  thin liquids;nectar-thick liquids;pudding/puree;secondary to reduced lingual strength;secondary to reduced lingual range of motion  -RD      Response to Oral Residue  unable to clear residue;other (see comments) reduced when alternating pudding/liqs  -RD         Initiation of Pharyngeal Swallow    Initiation of Pharyngeal Swallow  bolus in pyriform sinuses  -RD      Pharyngeal Phase  impaired pharyngeal phase of swallowing  -RD      Penetration Before the Swallow  thin liquids;secondary to reduced back of tongue control;secondary to delayed swallow initiation or mistiming  -RD      Penetration During the Swallow  thin liquids;secondary to delayed swallow initiation or mistiming;secondary to reduced laryngeal elevation;secondary to reduced vestibular closure  -RD      Response to Penetration  deep;transient;no response  -RD      Rosenbek's Scale  thin:;2--->level 2;nectar:;pudding/puree:;1--->level 1  -RD      Pharyngeal Residue  all consistencies tested;diffuse within pharynx;secondary to reduced posterior pharyngeal wall stripping;secondary to reduced laryngeal elevation;other (see comments) retrograde flow contributed to pharyngeal residue  -RD      Response to Residue  unable to clear residue  -RD      Attempted Compensatory Maneuvers  bolus size;bolus presentation style;alternative liquids/solids  -RD      Response to Attempted  Compensatory Maneuvers  did not prevent penetration  -RD      Pharyngeal Phase, Comment  Mild pharyngeal dysphagia. Penetration occurred before/during the swallow w/ thin liquids 2' pre-spill + delay and reduced vestibular closure. Inconsistent penetration after the swallow w/ thins. Penetration cleared upon completion of the swallow or w/ consecutive swallows. Timing improved w/ nectar-thick liquids, no penetration occurred. No aspiration w/ thins via small sips, nectar, or pudding consistencies. Mild-moderate diffuse pharyngeal residue w/ all consistencies tested 2' decr'd pharyngeal stripping wave and reduced elevation. Pt noted w/ prominent inward curvature of cervical spine that decr'd stripping wave and contributed to pharyngeal residue. Retrograde flow of material also contributed to pharyngeal residue (see esophageal). Residue reduced when alternating solids/liquids, but never fully cleared. Dysphagia somewhat anatomical in nature, but also some weakness and cognition impacting as well. Pt will likely not be able to participate in dysphagia exercises 2' baseline dementia, but SLP will f/u to ensure diet tolerance and edu for family.   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 This contributed to diffuse pharyngeal residue.   -RD         Clinical Impression    SLP Swallowing Diagnosis  moderate;oral dysfunction;mild;pharyngeal dysfunction  -RD      Functional Impact  risk of aspiration/pneumonia  -RD      Rehab Potential/Prognosis, Swallowing  adequate, monitor progress closely  -RD      Swallow Criteria for Skilled Therapeutic Interventions Met  demonstrates skilled criteria  -RD         Recommendations    Therapy Frequency (Swallow)  PRN  -RD       Predicted Duration Therapy Intervention (Days)  1 week  -RD      SLP Diet Recommendation  puree;thin liquids  -RD      Recommended Diagnostics  other (see comments) f/u for diet tolerance and further education on strategies  -RD      Recommended Precautions and Strategies  upright posture during/after eating;small bites of food and sips of liquid;alternate between small bites of food and sips of liquid;other (see comments) general aspiration/reflux precautions; Oral care BID/PRN  -RD      SLP Rec. for Method of Medication Administration  meds crushed;with pudding or applesauce;as tolerated  -RD      Monitor for Signs of Aspiration  yes;notify SLP if any concerns;other (see comments) if any issues may downgrade back to nectar-thick liquids  -RD      Anticipated Dischage Disposition  skilled nursing facility;anticipate therapy at next level of care  -RD        User Key  (r) = Recorded By, (t) = Taken By, (c) = Cosigned By    Initials Name Effective Dates    AV Giovanna Rojas, MS CCC-SLP 04/03/18 -     Shae Hernandez, MS CCC-SLP 04/03/18 -           EDUCATION  The patient has been educated in the following areas:   Dysphagia (Swallowing Impairment) Oral Care/Hydration Modified Diet Instruction.    SLP Recommendation and Plan  SLP Swallowing Diagnosis: moderate, oral dysfunction, mild, pharyngeal dysfunction  SLP Diet Recommendation: puree, thin liquids  Recommended Precautions and Strategies: upright posture during/after eating, small bites of food and sips of liquid, alternate between small bites of food and sips of liquid, other (see comments)(general aspiration/reflux precautions; Oral care BID/PRN)     Monitor for Signs of Aspiration: yes, notify SLP if any concerns, other (see comments)(if any issues may downgrade back to nectar-thick liquids)  Recommended Diagnostics: other (see comments)(f/u for diet tolerance and further education on strategies)  Swallow Criteria for Skilled Therapeutic Interventions  Met: demonstrates skilled criteria  Anticipated Dischage Disposition: skilled nursing facility, anticipate therapy at next level of care  Rehab Potential/Prognosis, Swallowing: adequate, monitor progress closely  Therapy Frequency (Swallow): PRN  Predicted Duration Therapy Intervention (Days): 1 week       Plan of Care Reviewed With: patient  Plan of Care Review  Plan of Care Reviewed With: patient  Progress: improving    SLP GOALS     Row Name 04/08/19 1045 04/05/19 1500          Oral Nutrition/Hydration Goal 1 (SLP)    Oral Nutrition/Hydration Goal 1, SLP  LTG: Pt will tolerate thins and level 2-pureed w/ 100% accuracy w/ min cues.   -RD  LTG:  Patient will tolerate LRD without s/s of pen/asp.   -AV     Time Frame (Oral Nutrition/Hydration Goal 1, SLP)  by discharge  -RD  by discharge  -AV        Oral Nutrition/Hydration Goal 2 (SLP)    Oral Nutrition/Hydration Goal 2, SLP  Pt will demonstrate no overt s/s of aspiration w/ thins and pureed w/ 100% accuracy w/o cues  -RD  --     Time Frame (Oral Nutrition/Hydration Goal 2, SLP)  short term goal (STG);by discharge  -RD  --        Swallow Management Recall Goal 1 (SLP)    Activity (Swallow Management Recall Goal 1, SLP)  safe diet/liquid level;safe diet level/texture;compensatory swallow strategies/techniques;postural techniques;other (see comments) family edu  -RD  --     [place]/Accuracy (Swallow Management Recall Goal 1, SLP)  with minimal cues (75-90% accuracy)  -RD  --     Time Frame (Swallow Management Recall Goal 1, SLP)  short term goal (STG);by discharge  -RD  --        Swallow Compensatory Strategies Goal 1 (SLP)    Activity (Swallow Compensatory Strategies/Techniques Goal 1, SLP)  compensatory strategies;postural techniques;small bites;small cup sips;small straw sips;alternate food/liquid intake  -RD  --     [place]/Accuracy (Swallow Compensatory Strategies/Techniques Goal 1, SLP)  with moderate cues (50-74% accuracy)  -RD  --     Time Frame  (Swallow Compensatory Strategies/Techniques Goal 1, SLP)  short term goal (STG);by discharge  -RD  --       User Key  (r) = Recorded By, (t) = Taken By, (c) = Cosigned By    Initials Name Provider Type    Giovanna Ferguson, MS CCC-SLP Speech and Language Pathologist    Shae Hernandez MS CCC-SLP Speech and Language Pathologist             Time Calculation:   Time Calculation- SLP     Row Name 04/08/19 1203             Time Calculation- SLP    SLP Start Time  1045  -      SLP Received On  04/08/19  -        User Key  (r) = Recorded By, (t) = Taken By, (c) = Cosigned By    Initials Name Provider Type    Shae Hernandez MS CCC-SLP Speech and Language Pathologist          Therapy Charges for Today     Code Description Service Date Service Provider Modifiers Qty    [id number] HC ST MOTION FLUORO EVAL SWALLOW 5 4/8/2019 Shae Barnhart MS CCC-SLP GN 1               Shae Barnhart MS CCC-ADRIANA  4/8/2019

## 2019-04-08 NOTE — DISCHARGE INSTR - DIET
MBS/VFSS 4/8/19  Reason for Referral  Patient was referred for a MBS to assess the efficiency of his/her swallow function, rule out aspiration and make recommendations regarding safe dietary consistencies, effective compensatory strategies, and safe eating environment.             Recommendations/Treatment  SLP Swallowing Diagnosis: moderate, oral dysfunction, mild, pharyngeal dysfunction  Functional Impact: risk of aspiration/pneumonia  Rehab Potential/Prognosis, Swallowing: adequate, monitor progress closely  Swallow Criteria for Skilled Therapeutic Interventions Met: demonstrates skilled criteria  Therapy Frequency (Swallow): PRN  Predicted Duration Therapy Intervention (Days): 1 week  SLP Diet Recommendation: puree, thin liquids  Recommended Diagnostics: other (see comments)(f/u for diet tolerance and further education on strategies)  Recommended Precautions and Strategies: upright posture during/after eating, small bites of food and sips of liquid, alternate between small bites of food and sips of liquid, other (see comments)(general aspiration/reflux precautions; Oral care BID/PRN)  SLP Rec. for Method of Medication Administration: meds crushed, with pudding or applesauce, as tolerated  Monitor for Signs of Aspiration: yes, notify SLP if any concerns, other (see comments)(if any issues may downgrade back to nectar-thick liquids)  Anticipated Dischage Disposition: skilled nursing facility, anticipate therapy at next level of care    Instrumental Set-up  Utensils Used: spoon, cup, straw  Consistencies Trialed: thin liquids, nectar/syrup-thick liquids, pudding thick(DNT solid)    Oral Preparation/ Oral Phase  Oral Prep Phase: impaired oral phase of swallowing  Oral Transit Phase: impaired  Oral Residue: impaired  Oral Preparatory Phase: prolonged manipulation, oral holding  Oral Holding: pudding/puree, secondary to impaired cognitive status  Prolonged Manipulation: all consistencies tested, secondary to reduced  lingual strength, secondary to reduced lingual range of motion, secondary to impaired cognitive status  Impaired Oral Transit Phase: increased A-P transit time, premature spillage of liquids into pharynx  Premature Spillage of Liquids into Pharynx: thin liquids, secondary to reduced lingual control  Impaired Oral Residue: diffuse residue throughout oral cavity  Diffuse Residue throughout Oral Cavity: thin liquids, nectar-thick liquids, pudding/puree, secondary to reduced lingual strength, secondary to reduced lingual range of motion  Response to Oral Residue: unable to clear residue, other (see comments)(reduced when alternating pudding/liqs)    Pharyngeal Phase  Initiation of Pharyngeal Swallow: bolus in pyriform sinuses  Pharyngeal Phase: impaired pharyngeal phase of swallowing  Penetration Before the Swallow: thin liquids, secondary to reduced back of tongue control, secondary to delayed swallow initiation or mistiming  Penetration During the Swallow: thin liquids, secondary to delayed swallow initiation or mistiming, secondary to reduced laryngeal elevation, secondary to reduced vestibular closure  Response to Penetration: deep, transient, no response  Pharyngeal Residue: all consistencies tested, diffuse within pharynx, secondary to reduced posterior pharyngeal wall stripping, secondary to reduced laryngeal elevation, other (see comments)(retrograde flow contributed to pharyngeal residue)  Response to Residue: unable to clear residue  Attempted Compensatory Maneuvers: bolus size, bolus presentation style, alternative liquids/solids  Response to Attempted Compensatory Maneuvers: did not prevent penetration  Pharyngeal Phase, Comment: Mild pharyngeal dysphagia. Penetration occurred before/during the swallow w/ thin liquids 2' pre-spill + delay and reduced vestibular closure. Inconsistent penetration after the swallow w/ thins. Penetration cleared upon completion of the swallow or w/ consecutive swallows. Timing  improved w/ nectar-thick liquids, no penetration occurred. No aspiration w/ thins via small sips, nectar, or pudding consistencies. Mild-moderate diffuse pharyngeal residue w/ all consistencies tested 2' decr'd pharyngeal stripping wave and reduced elevation. Pt noted w/ prominent inward curvature of cervical spine that decr'd stripping wave and contributed to pharyngeal residue. Retrograde flow of material also contributed to pharyngeal residue (see esophageal). Residue reduced when alternating solids/liquids, but never fully cleared. Dysphagia somewhat anatomical in nature, but also some weakness and cognition impacting as well. Pt will likely not be able to participate in dysphagia exercises 2' baseline dementia, but SLP will f/u to ensure diet tolerance and edu for family.     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 This contributed to diffuse pharyngeal residue.

## 2019-04-08 NOTE — PLAN OF CARE
Problem: Skin Injury Risk (Adult)  Goal: Skin Health and Integrity  Outcome: Ongoing (interventions implemented as appropriate)   04/08/19 0414   Skin Injury Risk (Adult)   Skin Health and Integrity other (see comments)     Pt's skin is frail and pt almost cries when removing adhesives. Bruised from lab draws/IV sticks. Original IV's were ultrasound-guided in the ED.    Problem: Fall Risk (Adult)  Goal: Absence of Fall  Outcome: Ongoing (interventions implemented as appropriate)   04/08/19 0414   Fall Risk (Adult)   Absence of Fall making progress toward outcome       Problem: Patient Care Overview  Goal: Plan of Care Review  Outcome: Ongoing (interventions implemented as appropriate)   04/08/19 0414   Coping/Psychosocial   Plan of Care Reviewed With patient   Plan of Care Review   Progress improving   OTHER   Outcome Summary Pt alert and awake this evening. Pleasantly confused, but POA states this is her baseline. Replaced potassium this shift; and K+ is 3.8 today. WBC has significantly decreased from admission with current regimen. Awaiting recommendations from ID for antibiotic therapy.

## 2019-04-08 NOTE — CONSULTS
Roro Guzman  6/1/1926  [id number]    Date of Consult: 4/8/2019     Admit Date:  4/5/2019    Requesting Provider: No ref. provider found  Evaluating Physician: Mario Brice MD    Chief Complaint: pt unable    Reason for Consultation: complicated UTI    History of present illness:    Patient is a 89 y.o.  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  Evaluated in the emergency room April 5, 2019 with acute deterioration in mental status, fever exceeding 102, noted to have acute kidney injury and tachycardia, and diagnosed with acute sepsis, UTI and given broad-spectrum antimicrobials.  Urine culture subsequently with gram-negative rods and empiric cefepime ongoing.  Kidney function improved with resuscitation and fever/leukocytosis improved with empiric antibiotics.  Urinalysis with pyuria/hematuria.    Patient not cooperative with review of systems or history.  Nursing reports no respiratory distress or cough.  No nausea/vomiting or diarrhea.  Urinary incontinence but no gross hematuria/pyuria per their report.  Patient debilitated with dementia and no other specific focal pain that they will relate.  No adverse drug reaction per nursing.  No rash      Past Medical History:   Diagnosis Date   • Anemia    • Clostridium difficile infection    • Dementia    • Depression    • Diabetes mellitus (CMS/[facility])    • GERD (gastroesophageal reflux disease)    • Hemorrhoid    • Hypertension    • PVD (peripheral vascular disease) (CMS/[facility])        History reviewed. No pertinent surgical history.    Pediatric History   Patient Guardian Status   • Not on file     Other Topics Concern   • Not on file   Social History Narrative   • Not on file   Nursing home resident and otherwise unknown    family history is not on file.  Unknown    Allergies   Allergen Reactions   • Ace Inhibitors Other (See Comments)     UNKNOWN   • Amoxicillin  Other (See Comments)     UNKNOWN   • Penicillins Other (See Comments)     UNKNOWN       Medication:  Current Facility-Administered Medications   Medication Dose Route Frequency Provider Last Rate Last Dose   • acetaminophen (TYLENOL) tablet 650 mg  650 mg Oral BID Elaina Frost MD   650 mg at 04/08/19 0804   • artificial tears (LUBRIFRESH P.M.) ophthalmic ointment   Topical Q1H PRN Elaina Frost MD       • cefepime (MAXIPIME) 1 g/100 mL 0.9% NS IVPB (mbp)  1 g Intravenous Q12H Vishal Garay MD   1 g at 04/07/19 2334   • famotidine (PEPCID) tablet 20 mg  20 mg Oral Daily Elaina Frost MD   20 mg at 04/08/19 0805   • heparin (porcine) 5000 UNIT/ML injection 5,000 Units  5,000 Units Subcutaneous Q8H Case, Porsha V., DO   5,000 Units at 04/08/19 0532   • labetalol (NORMODYNE,TRANDATE) injection 20 mg  20 mg Intravenous Q10 Min PRN Rosanne Rankin MD   20 mg at 04/07/19 1104   • magnesium sulfate 4 gram infusion - Mg less than or equal to 1mg/dL  4 g Intravenous PRN Elaina Frost MD        Or   • magnesium sulfate 3 gram infusion (1gm x 3) - Mg 1.1 - 1.5 mg/dL  1 g Intravenous PRN Elaina Frost MD        Or   • Magnesium Sulfate 2 gram infusion- Mg 1.6 - 1.9 mg/dL  2 g Intravenous PRN Elaina Frost MD       • NIFEdipine (PROCARDIA) capsule 20 mg  20 mg Oral Q8H Rosanne Rankin MD   20 mg at 04/08/19 0532   • nystatin (MYCOSTATIN) powder 1 application  1 application Topical BID Elaina Frost MD   1 application at 04/08/19 0805   • potassium chloride (MICRO-K) CR capsule 40 mEq  40 mEq Oral PRN Elaina Frost MD        Or   • potassium chloride (KLOR-CON) packet 40 mEq  40 mEq Oral PRN Elaina Frost MD   40 mEq at 04/08/19 0104    Or   • potassium chloride 10 mEq in 100 mL IVPB  10 mEq Intravenous Q1H PRN Elaina Frost MD       • potassium chloride 10 mEq in 100 mL IVPB  10 mEq Intravenous Q1H PRN Armand Dumont, APRN 100  "mL/hr at 04/07/19 1153 10 mEq at 04/07/19 1153   • sodium chloride 0.9 % flush 10 mL  10 mL Intravenous PRN Tee Simons MD       • sodium chloride 0.9 % flush 3 mL  3 mL Intravenous Q12H Porsha Major V., DO   3 mL at 04/07/19 0903   • sodium chloride 0.9 % flush 3-10 mL  3-10 mL Intravenous PRN Porsha Major., DO       • sodium chloride 0.9 % infusion  50 mL/hr Intravenous Continuous Elaina Frost MD 50 mL/hr at 04/07/19 1422 50 mL/hr at 04/07/19 1422   • venlafaxine (EFFEXOR) tablet 37.5 mg  37.5 mg Oral BID With Meals Elaina Frost MD   37.5 mg at 04/08/19 0805       Antibiotics:  Anti-Infectives (From admission, onward)    Ordered     Dose/Rate Route Frequency Start Stop    04/06/19 0817  cefepime (MAXIPIME) 1 g/100 mL 0.9% NS IVPB (mbp)     Ordering Provider:  Vishal Garay MD    1 g  over 4 Hours Intravenous Every 12 Hours 04/06/19 1200 04/15/19 1159    04/05/19 1248  cefepime (MAXIPIME) 2 g/100 mL 0.9% NS (mbp)     Ordering Provider:  Porsha Major., DO    2 g  200 mL/hr over 30 Minutes Intravenous Once 04/05/19 1430 04/05/19 1641    04/05/19 1128  vancomycin (VANCOCIN) in iso-osmotic dextrose IVPB 1 g (premix) 200 mL     Ordering Provider:  Silvano Britt [facility]    1,000 mg  over 60 Minutes Intravenous Once 04/05/19 1130 04/05/19 1245    04/05/19 0858  cefTRIAXone (ROCEPHIN) IVPB 2 g     Ordering Provider:  Tee Simons MD    2 g  100 mL/hr over 30 Minutes Intravenous Once 04/05/19 0900 04/05/19 1127            Review of Systems    Patient not cooperative and otherwise unable to obtain    Physical Exam: Nursing/chaperone present  Vital Signs   BP (!) 186/82 Comment: nurse notified  Pulse 75   Temp 98.7 °F (37.1 °C)   Resp 16   Ht 165.1 cm (65\")   Wt 59.4 kg (131 lb)   SpO2 96%   BMI 21.80 kg/m²     GENERAL: Awake and not interacting or answering specific questions at present, in no acute distress.   HEENT: Normocephalic, atraumatic. No conjunctival injection. " No icterus. Oropharynx clear without evidence of thrush or exudate. No evidence of peridontal disease.    NECK: Supple without nuchal rigidity. No mass.  LYMPH: No cervical, axillary or inguinal lymphadenopathy.  HEART: RRR; No murmur, rubs, gallops.   LUNGS: Diminished at bases bilateral bilaterally without wheezing, rales, rhonchi. Normal respiratory effort. Nonlabored. No dullness.  ABDOMEN: Soft, slight grimace with suprapubic pressure, nondistended. Positive bowel sounds. No rebound or guarding. NO mass or HSM.  EXT:  No cyanosis, clubbing or edema. No cord.  : Genitalia generally unremarkable.  Without Davis catheter.  MSK: FROM without joint effusions noted arms/legs.    SKIN: Warm and dry without cutaneous eruptions on Inspection/palpation.    NEURO: She does not interact in any meaningful way with motor/sensory exam    No peripheral stigmata/phenomena of endocarditis    Laboratory Data    Results from last 7 days   Lab Units 04/08/19  0331 04/07/19  0343 04/06/19  0352   WBC 10*3/mm3 7.85 12.17* 13.77*   HEMOGLOBIN g/dL 12.3 11.8 13.0   HEMATOCRIT % 36.9 36.9 42.1   PLATELETS 10*3/mm3 214 218 284     Results from last 7 days   Lab Units 04/08/19  0331   SODIUM mmol/L 143   POTASSIUM mmol/L 3.8   CHLORIDE mmol/L 115*   CO2 mmol/L 20.0   BUN mg/dL 24*   CREATININE mg/dL 0.65   GLUCOSE mg/dL 90   CALCIUM mg/dL 8.1*     Results from last 7 days   Lab Units 04/08/19  0331   ALK PHOS U/L 83   BILIRUBIN mg/dL 0.6   ALT (SGPT) U/L 58*   AST (SGOT) U/L 33               Estimated Creatinine Clearance: 42.1 mL/min (by C-G formula based on SCr of 0.65 mg/dL).      Microbiology:      Radiology:  Imaging Results (last 72 hours)     Procedure Component Value Units Date/Time    XR Chest 1 View [[id number]] Collected:  04/06/19 1351     Updated:  04/06/19 2258    Narrative:          EXAMINATION: XR CHEST 1 VW - 04/06/2019     INDICATION:  A41.9-Sepsis, unspecified organism; N39.0-Urinary tract  infection, site not  specified; N28.9-Disorder of kidney and ureter,  unspecified; E86.0-Dehydration; E87.3-Alkalosis; R74.0-Nonspecific  elevation of levels of transaminase and lactic acid dehydrogenase (ldh);  R41.82-Altered mental status, unspecified.      COMPARISON: 04/05/2019     FINDINGS: Patient again appears rotated to the right, apparently due to  marked levoconvex scoliosis. Heart shadow is mildly enlarged. The  vasculature appears normal. Lungs are moderately well-expanded and  appear clear. Old left-sided rib fractures and advanced shoulder joint  DJD are again noted.           Impression:       Stable chest exam with cardiomegaly but no evidence of  congestive failure. No new chest disease is identified.     DICTATED:   04/06/2019  EDITED/ls :   04/06/2019      This report was finalized on 4/6/2019 10:55 PM by DR. Earl Valadez MD.       XR Chest 1 View [[id number]] Collected:  04/05/19 1143     Updated:  04/05/19 1147    Narrative:       EXAMINATION: XR CHEST 1 VW-04/05/2019:      INDICATION: Pneumonia; A41.9-Sepsis, unspecified organism; N39.0-Urinary  tract infection, site not specified.      COMPARISON: 02/27/2019.     FINDINGS: The cardiac silhouette is normal. There is a tortuous thoracic  aorta. There is no acute inflammatory process, mass or effusion.           Impression:       Chronic change. There are no acute findings.     D:  04/05/2019  E:  04/05/2019     This report was finalized on 4/5/2019 11:44 AM by Dr. Derek Contreras MD.               Impression:     --Acute sepsis, urinary source and improving sepsis parameters with resuscitation/antibiotics.  No other particular focus by exam/symptoms at present but will monitor    --Acute complicated UTI.  E. coli/gram-negative marlin so far and culture.  High risk for further relapse in the future.  Family will need to help determine goals of care.  With recurrent UTIs, consideration could be given to urology input as outpatient for further functional/anatomic assessment to  help minimize risk for relapse.  However her age and comorbidity may limit utility and family may wish to focus more on conservative care/palliation rather than further workup.  However ultimately that decision will be up to them.    --Acute kidney injury.  Likely prerenal associated with sepsis and ATN.  However, check renal ultrasound to exclude any obvious obstruction     --Chronic dementia with acute encephalopathy out of proportion to baseline, likely associated with sepsis at least.  Unclear to me if hypertension or other issues related.  Further workup or decision regarding subspecialty evaluation per medicine at their discretion    --Hypertensive urgency per records.  Treatment per medicine    --Abnormal LFTs.  Monitor.  Abdomen otherwise benign    --History VRE    PLAN: Thank you for asking us to see Roro Guzman, I recommend the following:    --IV cefepime for now    --Check/review labs cultures and scans    --Discussed with microbiology    --Partial history per nursing staff    --Renal ultrasound pending    --Highly complex set of issues with high risk for further serious morbidity and other serious sequela       Mario Brice MD  4/8/2019

## 2019-04-08 NOTE — THERAPY TREATMENT NOTE
Acute Care - Physical Therapy Treatment Note  [facility]     Patient Name: Roro Guzman  : 1926  MRN: [id number]  Today's Date: 2019  Onset of Illness/Injury or Date of Surgery: 19  Date of Referral to PT: 19  Referring Physician: YDOIT Garay MD    Admit Date: 2019    Visit Dx:    ICD-10-CM ICD-9-CM   1. Sepsis due to urinary tract infection (CMS/[facility]) A41.9 038.9    N39.0 995.91     599.0   2. Renal insufficiency N28.9 593.9   3. Dehydration E86.0 276.51   4. Respiratory alkalosis E87.3 276.3   5. Elevated serum lactate dehydrogenase R74.0 790.4   6. Altered mental status, unspecified altered mental status type R41.82 780.97   7. Impaired mobility and ADLs Z74.09 799.89   8. Impaired functional mobility, balance, gait, and endurance Z74.09 V49.89   9. Oropharyngeal dysphagia R13.12 787.22     Patient Active Problem List   Diagnosis   • Sepsis due to urinary tract infection (CMS/[facility])   • Hypertension   • Diabetes mellitus (CMS/[facility])   • Depression   • Dementia   • TAMI (acute kidney injury) (CMS/[facility])   • Lactic acidosis   • Transaminitis       Therapy Treatment    Rehabilitation Treatment Summary     Row Name 19 1525             Treatment Time/Intention    Discipline  physical therapist  -YANICK      Document Type  therapy note (daily note)  -YANICK      Subjective Information  no complaints  -YANICK      Mode of Treatment  individual therapy  -YANICK      Patient/Family Observations  Son in room.   -YANICK      Care Plan Review  evaluation/treatment results reviewed;care plan/treatment goals reviewed;risks/benefits reviewed;current/potential barriers reviewed;patient/other agree to care plan  -YANICK      Care Plan Review, Other Participant(s)  son  -YANICK      Patient Effort  good  -YANICK      Comment  Pt agreed to exercises. Per son pt is too weak for transfers. PT explained sling lift, pt son stated he did not think she could tolerate it today.   -YANICK      Existing Precautions/Restrictions  fall  -YANICK       Patient Response to Treatment  No adverse events.   -YANICK      Recorded by [YANICK] Lupe Juan, PT 04/08/19 1544      Row Name 04/08/19 1525             Cognitive Assessment/Intervention    Additional Documentation  Cognitive Assessment/Intervention (Group)  -YANICK      Recorded by [YANICK] Lupe Juan, PT 04/08/19 1544      Row Name 04/08/19 1525             Cognitive Assessment/Intervention- PT/OT    Affect/Mental Status (Cognitive)  flat/blunted affect  -YANICK      Orientation Status (Cognition)  oriented to;person;situation  -YANICK      Follows Commands (Cognition)  follows one step commands;50-74% accuracy  -YANICK      Cognitive Function (Cognitive)  safety deficit  -YANICK      Safety Deficit (Cognitive)  awareness of need for assistance;safety precautions awareness;mild deficit  -YANICK      Personal Safety Interventions  fall prevention program maintained;gait belt;muscle strengthening facilitated;nonskid shoes/slippers when out of bed  -YANICK      Recorded by [YANICK] Lupe Juan, PT 04/08/19 1544      Row Name 04/08/19 1525             Bed Mobility Assessment/Treatment    Comment (Bed Mobility)  Deferred, pt son stated he thinks pt is too weak at this time to transfer.   -YANICK      Recorded by [YANICK] Lupe Juan, PT 04/08/19 1544      Row Name 04/08/19 1525             Transfer Assessment/Treatment    Comment (Transfers)  Deferred, see above.   -YANICK      Recorded by [YANICK] Lupe Juan, PT 04/08/19 1544      Row Name 04/08/19 1525             Gait/Stairs Assessment/Training    Comment (Gait/Stairs)  Not appropriate.   -YANICK      Recorded by [YANICK] Lupe Juan, PT 04/08/19 1544      Row Name 04/08/19 1525             Motor Skills Assessment/Interventions    Additional Documentation  Therapeutic Exercise (Group)  -YANICK      Recorded by [YANICK] Lupe Juan, PT 04/08/19 1544      Row Name 04/08/19 1525             Therapeutic Exercise    Therapeutic Exercise  supine, lower extremities  -YANICK      Additional Documentation   Therapeutic Exercise (Row)  -YANICK      00876 - PT Therapeutic Exercise Minutes  12  -YANICK      Recorded by [YANICK] Lupe Juan, PT 04/08/19 1544      Row Name 04/08/19 1525             Lower Extremity Supine Therapeutic Exercise    Performed, Supine Lower Extremity (Therapeutic Exercise)  SAQ (short arc quad) over bolster;ankle pumps;heel slides;hip external/internal rotation;hip abduction/adduction  -YANICK      Exercise Type, Supine Lower Extremity (Therapeutic Exercise)  AAROM (active assistive range of motion);PROM (passive range of motion)  -YANICK      Sets/Reps Detail, Supine Lower Extremity (Therapeutic Exercise)  12  -YANICK      Comment, Supine Lower Extremity (Therapeutic Exercise)  All PROM, except ankle pumps and heel slides on R LE. Pt able to actively assist with those.   -YANICK      Recorded by [YANICK] Lupe Juan, PT 04/08/19 1544      Row Name 04/08/19 1520             Positioning and Restraints    Pre-Treatment Position  in bed  -YANICK      Post Treatment Position  bed  -YANICK      In Bed  notified nsg;supine;call light within reach;encouraged to call for assist;with family/caregiver  -YANICK      Recorded by [YANICK] Lupe uJan, PT 04/08/19 1544      Row Name 04/08/19 1525             Pain Assessment    Additional Documentation  Pain Scale: FACES Pre/Post-Treatment (Group)  -YANICK      Recorded by [YANICK] Lupe Juan, PT 04/08/19 1544      Row Name 04/08/19 1525             Pain Scale: FACES Pre/Post-Treatment    Pain: FACES Scale, Pretreatment  0-->no hurt  -YANICK      Pain: FACES Scale, Post-Treatment  2-->hurts little bit  -YANICK      Pre/Post Treatment Pain Comment  L knee hurt due to arthritis per pt and pt son.   -YANICK      Recorded by [YANICK] Lupe Juan, PT 04/08/19 1544      Row Name 04/08/19 1525             Coping    Observed Emotional State  calm;cooperative  -YANICK      Verbalized Emotional State  acceptance  -YANICK      Recorded by [YANICK] Lupe Juan, PT 04/08/19 1544      Row Name 04/08/19 1529             Plan of  Care Review    Plan of Care Reviewed With  patient;son  -YANICK      Recorded by [YANICK] Yessica Lupe, PT 04/08/19 1544      Row Name 04/08/19 1525             Outcome Summary/Treatment Plan (PT)    Daily Summary of Progress (PT)  progress towards functional goals is fair  -YANICK      Anticipated Discharge Disposition (PT)  [facility] nursing [place] [facility]  -YANICK      Recorded by [YANICK] Yessica Lupe, PT 04/08/19 1544        User Key  (r) = Recorded By, (t) = Taken By, (c) = Cosigned By    Initials Name Effective Dates Discipline    YANICK Yessica Lupe, PT 08/30/18 -  PT               Rehab Goal Summary     Row Name 04/08/19 1045             Swallow Goals (SLP)    Oral Nutrition/Hydration Goal Selection (SLP)  oral nutrition/hydration, SLP goal 1;oral nutrition/hydration, SLP goal 2  -RD      Swallow Management Recall Goal Selection (SLP)  swallow management recall, SLP goal 1  -RD      Swallow Compensatory Strategies Goal Selection (SLP)  swallow compensatory strategies, SLP goal 1  -RD      Additional Documentation  swallow compensatory strategies goal selection (SLP);swallow management recall goal selection (SLP)  -RD         Oral Nutrition/Hydration Goal 1 (SLP)    Oral Nutrition/Hydration Goal 1, SLP  LTG: Pt will tolerate thins and level 2-pureed w/ 100% accuracy w/ min cues.   -RD      Time Frame (Oral Nutrition/Hydration Goal 1, SLP)  by discharge  -RD         Oral Nutrition/Hydration Goal 2 (SLP)    Oral Nutrition/Hydration Goal 2, SLP  Pt will demonstrate no overt s/s of aspiration w/ thins and pureed w/ 100% accuracy w/o cues  -RD      Time Frame (Oral Nutrition/Hydration Goal 2, SLP)  short term goal (STG);by discharge  -RD         Swallow Management Recall Goal 1 (SLP)    Activity (Swallow Management Recall Goal 1, SLP)  safe diet/liquid level;safe diet level/texture;compensatory swallow strategies/techniques;postural techniques;other (see comments) family edu  -RD      [place]/Accuracy (Swallow Management Recall  Goal 1, SLP)  with minimal cues (75-90% accuracy)  -RD      Time Frame (Swallow Management Recall Goal 1, SLP)  short term goal (STG);by discharge  -RD         Swallow Compensatory Strategies Goal 1 (SLP)    Activity (Swallow Compensatory Strategies/Techniques Goal 1, SLP)  compensatory strategies;postural techniques;small bites;small cup sips;small straw sips;alternate food/liquid intake  -RD      [place]/Accuracy (Swallow Compensatory Strategies/Techniques Goal 1, SLP)  with moderate cues (50-74% accuracy)  -RD      Time Frame (Swallow Compensatory Strategies/Techniques Goal 1, SLP)  short term goal (STG);by discharge  -RD        User Key  (r) = Recorded By, (t) = Taken By, (c) = Cosigned By    Initials Name Provider Type Discipline    Shae Hernandez MS CCC-SLP Speech and Language Pathologist SLP          Physical Therapy Education     Title: PT OT SLP Therapies (In Progress)     Topic: Physical Therapy (In Progress)     Point: Mobility training (In Progress)     Learning Progress Summary           Patient Acceptance, E,D, NR by DM at 4/6/2019  4:25 PM                   Point: Home exercise program (In Progress)     Learning Progress Summary           Patient Acceptance, E,D, NR by DM at 4/6/2019  4:25 PM                   Point: Body mechanics (In Progress)     Learning Progress Summary           Patient Acceptance, E,D, NR by DM at 4/6/2019  4:25 PM                   Point: Precautions (In Progress)     Learning Progress Summary           Patient Acceptance, E,D, NR by DM at 4/6/2019  4:25 PM                               User Key     Initials Effective Dates Name Provider Type Discipline    NICOLE 06/19/15 -  Chloé Herrmann, PT Physical Therapist PT                PT Recommendation and Plan  Anticipated Discharge Disposition (PT): skilled nursing facility  Outcome Summary/Treatment Plan (PT)  Daily Summary of Progress (PT): progress towards functional goals is fair  Anticipated Discharge Disposition  (PT): skilled nursing facility  Plan of Care Reviewed With: patient, son  Progress: improving  Outcome Summary: Pt awake and alert, son in room. Pt and pt's son agreeable with bed level exercises this date but son stated he does not think pt would tolerate more. LE exercises completed, AAROM with heel slides and ankle pumps but PROM with remained LE TE. Cont to progress as tolerated.   Outcome Measures     Row Name 04/08/19 1525 04/06/19 1448 04/06/19 1352       How much help from another person do you currently need...    Turning from your back to your side while in flat bed without using bedrails?  1  -YANICK  1  -DM  --    Moving from lying on back to sitting on the side of a flat bed without bedrails?  1  -YANICK  1  -DM  --    Moving to and from a bed to a chair (including a wheelchair)?  1  -YANICK  1  -DM  --    Standing up from a chair using your arms (e.g., wheelchair, bedside chair)?  1  -YANICK  1  -DM  --    Climbing 3-5 steps with a railing?  1  -YANICK  1  -DM  --    To walk in hospital room?  1  -YANICK  1  -DM  --    AM-PAC 6 Clicks Score  6  -YANICK  6  -DM  --       How much help from another is currently needed...    Putting on and taking off regular lower body clothing?  --  --  1  -JR    Bathing (including washing, rinsing, and drying)  --  --  1  -JR    Toileting (which includes using toilet bed pan or urinal)  --  --  1  -JR    Putting on and taking off regular upper body clothing  --  --  1  -JR    Taking care of personal grooming (such as brushing teeth)  --  --  1  -JR    Eating meals  --  --  1  -JR    Score  --  --  6  -JR       Functional Assessment    Outcome Measure Options  AM-PAC 6 Clicks Basic Mobility (PT)  -YANICK  AM-PAC 6 Clicks Basic Mobility (PT)  -DM  AM-PAC 6 Clicks Daily Activity (OT)  -JR      User Key  (r) = Recorded By, (t) = Taken By, (c) = Cosigned By    Initials Name Provider Type    DM Chloé Herrmann, PT Physical Therapist    JR Patito James, OT Occupational Therapist    YANICK Lupe Juan,  PT Physical Therapist         Time Calculation:   PT Charges     Row Name 04/08/19 1525             Time Calculation    Start Time  1525  -YANICK      PT Received On  04/08/19  -YANICK      PT Goal Re-Cert Due Date  04/16/19  -YANICK         Time Calculation- PT    Total Timed Code Minutes- PT  12 minute(s)  -YANICK         Timed Charges    67225 - PT Therapeutic Exercise Minutes  12  -YANICK        User Key  (r) = Recorded By, (t) = Taken By, (c) = Cosigned By    Initials Name Provider Type    Lupe Hardy, PT Physical Therapist        Therapy Charges for Today     Code Description Service Date Service Provider Modifiers Qty    38201992563 HC PT THER PROC EA 15 MIN 4/8/2019 Lupe Juan, PT GP 1          PT G-Codes  Outcome Measure Options: AM-PAC 6 Clicks Basic Mobility (PT)  AM-PAC 6 Clicks Score: 6  Score: 6    Lupe Juan, HAIR  4/8/2019

## 2019-04-08 NOTE — PROGRESS NOTES
Continued Stay Note  [facility]     Patient Name: Roro Guzman  MRN: [id number]  Today's Date: 4/8/2019    Admit Date: 4/5/2019    Discharge Plan     Row Name 04/08/19 1304       Plan    [facility]  [facility]    Patient/Family in Agreement with Plan  yes    Plan Comments  I spoke with [facility] for [facility]. Patient has a 14 day bed hold under Medicaid. I let Mora know patient may possibly need iv antibiotic at discharge. She will check to see if they need to prior authorize with her [facility] Medicare.     Final Discharge Disposition Code  03 - skilled nursing facility (SNF)        Discharge Codes    No documentation.       Expected Discharge Date and Time     Expected Discharge Date Expected Discharge Time    Apr 11, 2019             Rosanne Pederson RN

## 2019-04-09 PROCEDURE — 97110 THERAPEUTIC EXERCISES: CPT

## 2019-04-09 PROCEDURE — 99232 SBSQ HOSP IP/OBS MODERATE 35: CPT | Performed by: INTERNAL MEDICINE

## 2019-04-09 PROCEDURE — 97530 THERAPEUTIC ACTIVITIES: CPT

## 2019-04-09 PROCEDURE — 25010000002 HEPARIN (PORCINE) PER 1000 UNITS: Performed by: INTERNAL MEDICINE

## 2019-04-09 PROCEDURE — 92526 ORAL FUNCTION THERAPY: CPT

## 2019-04-09 RX ADMIN — CEFEPIME HYDROCHLORIDE 1 G: 1 INJECTION, POWDER, FOR SOLUTION INTRAMUSCULAR; INTRAVENOUS at 11:01

## 2019-04-09 RX ADMIN — HEPARIN SODIUM 5000 UNITS: 5000 INJECTION INTRAVENOUS; SUBCUTANEOUS at 22:00

## 2019-04-09 RX ADMIN — CARVEDILOL 12.5 MG: 12.5 TABLET, FILM COATED ORAL at 09:29

## 2019-04-09 RX ADMIN — NIFEDIPINE 20 MG: 10 CAPSULE, LIQUID FILLED ORAL at 22:23

## 2019-04-09 RX ADMIN — NIFEDIPINE 20 MG: 10 CAPSULE, LIQUID FILLED ORAL at 06:37

## 2019-04-09 RX ADMIN — NYSTATIN 1 APPLICATION: 100000 POWDER TOPICAL at 09:31

## 2019-04-09 RX ADMIN — FAMOTIDINE 20 MG: 20 TABLET ORAL at 09:30

## 2019-04-09 RX ADMIN — VENLAFAXINE 37.5 MG: 37.5 TABLET ORAL at 22:27

## 2019-04-09 RX ADMIN — VENLAFAXINE 37.5 MG: 37.5 TABLET ORAL at 09:30

## 2019-04-09 RX ADMIN — ACETAMINOPHEN 650 MG: 325 TABLET, FILM COATED ORAL at 22:00

## 2019-04-09 RX ADMIN — NYSTATIN 1 APPLICATION: 100000 POWDER TOPICAL at 22:23

## 2019-04-09 RX ADMIN — CARVEDILOL 12.5 MG: 12.5 TABLET, FILM COATED ORAL at 22:00

## 2019-04-09 RX ADMIN — HEPARIN SODIUM 5000 UNITS: 5000 INJECTION INTRAVENOUS; SUBCUTANEOUS at 06:37

## 2019-04-09 RX ADMIN — HEPARIN SODIUM 5000 UNITS: 5000 INJECTION INTRAVENOUS; SUBCUTANEOUS at 13:11

## 2019-04-09 RX ADMIN — NIFEDIPINE 20 MG: 10 CAPSULE, LIQUID FILLED ORAL at 13:11

## 2019-04-09 RX ADMIN — ACETAMINOPHEN 650 MG: 325 TABLET, FILM COATED ORAL at 09:30

## 2019-04-09 RX ADMIN — CEFEPIME HYDROCHLORIDE 1 G: 1 INJECTION, POWDER, FOR SOLUTION INTRAMUSCULAR; INTRAVENOUS at 00:04

## 2019-04-09 NOTE — PLAN OF CARE
Problem: Patient Care Overview  Goal: Plan of Care Review  Outcome: Ongoing (interventions implemented as appropriate)   04/09/19 1148   Coping/Psychosocial   Plan of Care Reviewed With patient   Plan of Care Review   Progress improving   OTHER   Outcome Summary Pt demo improved independence and activity tolerance Pt CGA for static sitting balance while performing grooming tasks at EOB. Pt tolerated ther-ex well and demo good effort w/ treatment, though limited d/t significant generalized weakness. Recommend cont skilled IPOT POC.

## 2019-04-09 NOTE — PROGRESS NOTES
[facility] Progress Note        Antibiotics:  Anti-Infectives (From admission, onward)    Ordered     Dose/Rate Route Frequency Start Stop    04/06/19 0817  cefepime (MAXIPIME) 1 g/100 mL 0.9% NS IVPB (mbp)     Ordering Provider:  Vishal Garay MD    1 g  over 4 Hours Intravenous Every 12 Hours 04/06/19 1200 04/15/19 1159    04/05/19 1248  cefepime (MAXIPIME) 2 g/100 mL 0.9% NS (mbp)     Ordering Provider:  Porsha Major., DO    2 g  200 mL/hr over 30 Minutes Intravenous Once 04/05/19 1430 04/05/19 1641    04/05/19 1128  vancomycin (VANCOCIN) in iso-osmotic dextrose IVPB 1 g (premix) 200 mL     Ordering Provider:  Silvano Britt, RPH    1,000 mg  over 60 Minutes Intravenous Once 04/05/19 1130 04/05/19 1245    04/05/19 0858  cefTRIAXone (ROCEPHIN) IVPB 2 g     Ordering Provider:  Tee Simons MD    2 g  100 mL/hr over 30 Minutes Intravenous Once 04/05/19 0900 04/05/19 1127          CC:    HPI:    Patient is a 89 y.o.  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  Evaluated in the emergency room April 5, 2019 with acute deterioration in mental status, fever exceeding 102, noted to have acute kidney injury and tachycardia, and diagnosed with acute sepsis, UTI and given broad-spectrum antimicrobials.  Urine culture subsequently with gram-negative rods and empiric cefepime ongoing.  Kidney function improved with resuscitation and fever/leukocytosis improved with empiric antibiotics.  Urinalysis with pyuria/hematuria.     4/9/19 Patient not cooperative with review of systems or history.  Nursing reports no respiratory distress or cough.  No nausea/vomiting or diarrhea.  Urinary incontinence but no gross hematuria/pyuria per their report.  Patient debilitated with dementia and no other specific focal pain that they will relate.  No adverse drug reaction per nursing.  No rash         ROS:      4/9/19 No f/c/s. No  "n/v/d. No rash. No new ADR to Abx.     PE:   /67   Pulse 69   Temp 98.8 °F (37.1 °C)   Resp 16   Ht 165.1 cm (65\")   Wt 59.4 kg (131 lb)   SpO2 94%   BMI 21.80 kg/m²     GENERAL: sleepy and not interacting or answering specific questions at present, in no acute distress.   HEENT: Normocephalic, atraumatic. No conjunctival injection. No icterus. Oropharynx clear without evidence of thrush or exudate. No evidence of peridontal disease.    NECK: Supple without nuchal rigidity. No mass.  LYMPH: No cervical, axillary or inguinal lymphadenopathy.  HEART: RRR; No murmur, rubs, gallops.   LUNGS: Diminished at bases bilateral bilaterally without wheezing, rales, rhonchi. Normal respiratory effort. Nonlabored. No dullness.  ABDOMEN: Soft, slight grimace with suprapubic pressure, nondistended. Positive bowel sounds. No rebound or guarding. NO mass or HSM.  EXT:  No cyanosis, clubbing or edema. No cord.  : Genitalia generally unremarkable.  Without Davis catheter.  MSK: FROM without joint effusions noted arms/legs.    SKIN: Warm and dry without cutaneous eruptions on Inspection/palpation.    NEURO: She does not interact in any meaningful way with motor/sensory exam     No peripheral stigmata/phenomena of endocarditis          Laboratory Data    Results from last 7 days   Lab Units 04/08/19  0331 04/07/19  0343 04/06/19  0352   WBC 10*3/mm3 7.85 12.17* 13.77*   HEMOGLOBIN g/dL 12.3 11.8 13.0   HEMATOCRIT % 36.9 36.9 42.1   PLATELETS 10*3/mm3 214 218 284     Results from last 7 days   Lab Units 04/08/19  0331   SODIUM mmol/L 143   POTASSIUM mmol/L 3.8   CHLORIDE mmol/L 115*   CO2 mmol/L 20.0   BUN mg/dL 24*   CREATININE mg/dL 0.65   GLUCOSE mg/dL 90   CALCIUM mg/dL 8.1*     Results from last 7 days   Lab Units 04/08/19  0331   ALK PHOS U/L 83   BILIRUBIN mg/dL 0.6   ALT (SGPT) U/L 58*   AST (SGOT) U/L 33               Estimated Creatinine Clearance: 42.1 mL/min (by C-G formula based on SCr of 0.65 " mg/dL).      Microbiology:      Radiology:  Imaging Results (last 72 hours)     Procedure Component Value Units Date/Time    FL Video Swallow With Speech [[id number]] Collected:  04/08/19 1441     Updated:  04/08/19 1657    Narrative:       EXAMINATION: FL VIDEO SWALLOW W SPEECH-     INDICATION: DYSPHAGIA, OROPHARYNGEAL, HAS ATTRIBUTABLE CAUSE     TECHNIQUE: 1 minute and 12 seconds of fluoroscopic time was used for  this exam. 1 associated image was saved. The patient was evaluated in  the seated lateral position while taking a variety of consistencies of  barium by mouth under the direction of speech pathology.     COMPARISON: NONE     FINDINGS: There was penetration that cleared without aspiration with  sips of thin barium. There was no penetration and no aspiration with  nectar, or pudding consistency barium.          Impression:       Fluoroscopy provided for a modified barium swallow. Please  see speech therapy report for full details and recommendations.         This report was finalized on 4/8/2019 4:53 PM by Dr. Derek Contreras MD.       US Renal Bilateral [[id number]] Collected:  04/08/19 1601     Updated:  04/08/19 1609    Narrative:       EXAMINATION: US RENAL, BILATERAL-04/08/2019:     INDICATION: TAMI; UTI and sepsis;  r/o obstruction; A41.9-Sepsis,  unspecified organism; N39.0-Urinary tract infection, site not specified;  N28.9-Disorder of kidney and ureter, unspecified; E86.0-Dehydration;  E87.3-Alkalosis; R74.0-Nonspecific elevation of levels of transaminase  and lactic acid dehydrogenase (ldh); R41.82-Altered mental status,  unspecified; Z74.09-Other reduced mobility; Z74.09-Other reduced.       TECHNIQUE: Ultrasound of the kidneys and urinary bladder.     COMPARISON: NONE.     FINDINGS:  The right kidney measures 9.3 cm in length without evidence  of hydronephrosis, contour deforming mass or obvious calculi.     The left kidney measures 8.5 cm in length without evidence of  hydronephrosis, contour  deforming mass or obvious calculi.     The urinary bladder is moderately distended with layering echogenic  material without significant internal flow on Doppler interrogation  likely representing debris.     Incidental note of echogenic foci with shadowing in the gallbladder  indicating cholelithiasis.       Impression:       1.  No hydronephrosis.  2.  Layering echogenic material of likely debris within the urinary  bladder. This does not have internal flow on Doppler interrogation.  3.  Incidental note of cholelithiasis.     D:  04/08/2019  E:  04/08/2019            This report was finalized on 4/8/2019 4:06 PM by Dr. Leo Lombardi.       XR Chest 1 View [[id number]] Collected:  04/06/19 1351     Updated:  04/06/19 2258    Narrative:          EXAMINATION: XR CHEST 1 VW - 04/06/2019     INDICATION:  A41.9-Sepsis, unspecified organism; N39.0-Urinary tract  infection, site not specified; N28.9-Disorder of kidney and ureter,  unspecified; E86.0-Dehydration; E87.3-Alkalosis; R74.0-Nonspecific  elevation of levels of transaminase and lactic acid dehydrogenase (ldh);  R41.82-Altered mental status, unspecified.      COMPARISON: 04/05/2019     FINDINGS: Patient again appears rotated to the right, apparently due to  marked levoconvex scoliosis. Heart shadow is mildly enlarged. The  vasculature appears normal. Lungs are moderately well-expanded and  appear clear. Old left-sided rib fractures and advanced shoulder joint  DJD are again noted.           Impression:       Stable chest exam with cardiomegaly but no evidence of  congestive failure. No new chest disease is identified.     DICTATED:   04/06/2019  EDITED/ls :   04/06/2019      This report was finalized on 4/6/2019 10:55 PM by DR. Earl Valadez MD.               Impression:     --Acute sepsis, urinary source and improving sepsis parameters with resuscitation/antibiotics.  No other particular focus by exam/symptoms at present but will monitor     --Acute complicated UTI.   E. coli/gram-negative marlin so far and culture.  High risk for further relapse in the future.  Family will need to help determine goals of care.  With recurrent UTIs, consideration could be given to urology input as outpatient for further functional/anatomic assessment to help minimize risk for relapse.  However her age and comorbidity may limit utility and family may wish to focus more on conservative care/palliation rather than further workup.  However ultimately that decision will be up to them.     --Acute kidney injury.  Likely prerenal associated with sepsis and ATN.  However, check renal ultrasound to exclude any obvious obstruction      --Chronic dementia with acute encephalopathy out of proportion to baseline, likely associated with sepsis at least.  Unclear to me if hypertension or other issues related.  Further workup or decision regarding subspecialty evaluation per medicine at their discretion     --Hypertensive urgency per records.  Treatment per medicine     --Abnormal LFTs.  Monitor.  Abdomen otherwise benign     --History VRE       PLAN:      --IV cefepime for now     --Check/review labs cultures and scans     --Discussed with microbiology     --Partial history per nursing staff     --Renal ultrasound pending     --Highly complex set of issues with high risk for further serious morbidity and other serious sequela    --Oral Omnicef may be option to help transition to oral agent and finish therapy for UTI, 7-10 days total from initiation of cefepime.       Mario Brice MD  4/9/2019

## 2019-04-09 NOTE — PLAN OF CARE
Problem: Patient Care Overview  Goal: Plan of Care Review  Outcome: Ongoing (interventions implemented as appropriate)   04/09/19 1150   Coping/Psychosocial   Plan of Care Reviewed With patient   OTHER   Outcome Summary Pt is pleasantly confused and cooperative with therex requiring verbal and tactile cues for AAROM

## 2019-04-09 NOTE — THERAPY TREATMENT NOTE
Acute Care - Occupational Therapy Treatment Note  [facility]     Patient Name: Roro Guzman  : 1926  MRN: [id number]  Today's Date: 2019  Onset of Illness/Injury or Date of Surgery: 19  Date of Referral to OT: 19  Referring Physician: YODIT Garay MD    Admit Date: 2019       ICD-10-CM ICD-9-CM   1. Sepsis due to urinary tract infection (CMS/HCC) A41.9 038.9    N39.0 995.91     599.0   2. Renal insufficiency N28.9 593.9   3. Dehydration E86.0 276.51   4. Respiratory alkalosis E87.3 276.3   5. Elevated serum lactate dehydrogenase R74.0 790.4   6. Altered mental status, unspecified altered mental status type R41.82 780.97   7. Impaired mobility and ADLs Z74.09 799.89   8. Impaired functional mobility, balance, gait, and endurance Z74.09 V49.89   9. Oropharyngeal dysphagia R13.12 787.22     Patient Active Problem List   Diagnosis   • Sepsis due to urinary tract infection (CMS/HCC)   • Hypertension   • Diabetes mellitus (CMS/HCC)   • Depression   • Dementia   • TAMI (acute kidney injury) (CMS/[facility])   • Lactic acidosis   • Transaminitis     Past Medical History:   Diagnosis Date   • Anemia    • Clostridium difficile infection    • Dementia    • Depression    • Diabetes mellitus (CMS/HCC)    • GERD (gastroesophageal reflux disease)    • Hemorrhoid    • Hypertension    • PVD (peripheral vascular disease) (CMS/[facility])      History reviewed. No pertinent surgical history.    Therapy Treatment    Rehabilitation Treatment Summary     Row Name 19 1025             Treatment Time/Intention    Discipline  occupational therapist  -CL      Document Type  therapy note (daily note)  -CL      Subjective Information  complains of;fatigue  -CL      Patient Effort  good  -CL      Existing Precautions/Restrictions  fall  -CL      Treatment Considerations/Comments  Pt declined any OOB activity.   -CL      Recorded by [CL] Zuleyka Julian, IMER 19 1147      Row Name 19 1025             Vital Signs    Pre  Systolic BP Rehab  -- no yasemin RN cleared for tx.   -CL      Recorded by [CL] Zuleyka Julian OT 04/09/19 1147      Row Name 04/09/19 1025             Cognitive Assessment/Intervention- PT/OT    Affect/Mental Status (Cognitive)  confused  -CL      Orientation Status (Cognition)  oriented to;person  -CL      Follows Commands (Cognition)  follows one step commands;75-90% accuracy;repetition of directions required;verbal cues/prompting required [place]  -CL      Cognitive Function (Cognitive)  safety deficit  -CL      Safety Deficit (Cognitive)  mild deficit;awareness of need for assistance;safety precautions awareness  -CL      Personal Safety Interventions  fall prevention program maintained;muscle strengthening facilitated;nonskid shoes/slippers when out of bed;supervised activity  -CL      Recorded by [CL] Zuleyka Julian OT 04/09/19 1147      Row Name 04/09/19 1025             Bed Mobility Assessment/Treatment    Bed Mobility Assessment/Treatment  supine-sit;sit-supine  -CL      Supine-Sit [place] (Bed Mobility)  maximum assist (25% patient effort);2 person assist;verbal cues  -CL      Sit-Supine [place] (Bed Mobility)  dependent (less than 25% patient effort);2 person assist;verbal cues  -CL      Assistive Device (Bed Mobility)  bed rails;draw sheet;head of bed elevated  -CL      Recorded by [CL] Zuleyka Julian OT 04/09/19 1147      Row Name 04/09/19 1025             Transfer Assessment/Treatment    Comment (Transfers)  Pt declined.   -CL      Recorded by [CL] Zuleyka Julian OT 04/09/19 1147      Row Name 04/09/19 1025             ADL Assessment/Intervention    26076 - OT Self Care/Mgmt Minutes  4  -CL      BADL Assessment/Intervention  grooming  -CL      Recorded by [CL] Zuleyka Julian OT 04/09/19 1147      Row Name 04/09/19 1025             Grooming Assessment/Training    [place] Level (Grooming)  wash face, hands;supervision;verbal cues;hair care, combing/brushing;maximum assist (25% patient effort)  -CL       Grooming Position  edge of bed sitting  -CL      Recorded by [CL] Zuleyka Julian OT 04/09/19 1147      Row Name 04/09/19 1025             Therapeutic Exercise    Therapeutic Exercise  seated, upper extremities;seated, lower extremities  -CL      18327 - OT Therapeutic Exercise Minutes  6  -CL      72733 - OT Therapeutic Activity Minutes  15  -CL      Recorded by [CL] Zuleyka Julian OT 04/09/19 1147      Row Name 04/09/19 1025             Upper Extremity Seated Therapeutic Exercise    Performed, Seated Upper Extremity (Therapeutic Exercise)  shoulder flexion/extension;elbow flexion/extension;digit flexion/extension  -CL      Exercise Type, Seated Upper Extremity (Therapeutic Exercise)  AAROM (active assistive range of motion);AROM (active range of motion)  -CL      Sets/Reps Detail, Seated Upper Extremity (Therapeutic Exercise)  1/8  -CL      Comment, Seated Upper Extremity (Therapeutic Exercise)  BUE  -CL      Recorded by [CL] Zuleyka Julian OT 04/09/19 1147      Row Name 04/09/19 1025             Lower Extremity Seated Therapeutic Exercise    Performed, Seated Lower Extremity (Therapeutic Exercise)  hip flexion/extension;LAQ (long arc quad), knee extension;ankle dorsiflexion/plantarflexion  -CL      Exercise Type, Seated Lower Extremity (Therapeutic Exercise)  AROM (active range of motion);AAROM (active assistive range of motion)  -CL      Sets/Reps Detail, Seated Lower Extremity (Therapeutic Exercise)  1/8  -CL      Comment, Seated Lower Extremity (Therapeutic Exercise)  BLE  -CL      Recorded by [CL] Zuleyka Julian OT 04/09/19 1147      Row Name 04/09/19 1025             Balance    Balance  static sitting balance  -CL      Recorded by [CL] Zuleyka Julian OT 04/09/19 1147      Row Name 04/09/19 1025             Static Sitting Balance    Level of [place] (Unsupported Sitting, Static Balance)  contact guard assist  -CL      Sitting Position (Unsupported Sitting, Static Balance)  sitting on edge of bed  -CL       Time Able to Maintain Position (Unsupported Sitting, Static Balance)  more than 5 minutes  -CL      Recorded by [CL] Zuleyka Julian OT 04/09/19 1147      Row Name 04/09/19 1025             Positioning and Restraints    Pre-Treatment Position  in bed  -CL      Post Treatment Position  bed  -CL      In Bed  notified nsg;fowlers;call light within reach;encouraged to call for assist;exit alarm on;side rails up x3;legs elevated;heels elevated  -CL      Recorded by [CL] Zuleyka Julian OT 04/09/19 1147      Row Name 04/09/19 1025             Pain Scale: Numbers Pre/Post-Treatment    Pain Scale: Numbers, Pretreatment  0/10 - no pain  -CL      Pain Scale: Numbers, Post-Treatment  0/10 - no pain  -CL      Recorded by [CL] Zuleyka Julian OT 04/09/19 1147        User Key  (r) = Recorded By, (t) = Taken By, (c) = Cosigned By    Initials Name Effective Dates Discipline    CL Zuleyka Julian OT 04/03/18 -  OT             Occupational Therapy Education     Title: PT OT SLP Therapies (In Progress)     Topic: Occupational Therapy (In Progress)     Point: ADL training (In Progress)     Description: Instruct learner(s) on proper safety adaptation and remediation techniques during self care or transfers.   Instruct in proper use of assistive devices.    Learning Progress Summary           Patient Acceptance, E, NR by CL at 4/9/2019 10:25 AM    Comment:  Pt educated on appropriate safety precautions, HEP, positioning, role of OT, and benefits of therapy.    Acceptance, E, NR by JR at 4/6/2019  1:52 PM    Comment:  Educated pt and family regarding role of therapy and ongoing treatment plan.                   Point: Home exercise program (In Progress)     Description: Instruct learner(s) on appropriate technique for monitoring, assisting and/or progressing therapeutic exercises/activities.    Learning Progress Summary           Patient Acceptance, E, NR by CL at 4/9/2019 10:25 AM    Comment:  Pt educated on appropriate safety precautions, HEP,  positioning, role of OT, and benefits of therapy.    Acceptance, E, VU,NR by YANICK at 4/8/2019  3:25 PM   Family Acceptance, E, VU,NR by YANICK at 4/8/2019  3:25 PM                   Point: Precautions (In Progress)     Description: Instruct learner(s) on prescribed precautions during self-care and functional transfers.    Learning Progress Summary           Patient Acceptance, E, NR by CL at 4/9/2019 10:25 AM    Comment:  Pt educated on appropriate safety precautions, HEP, positioning, role of OT, and benefits of therapy.                   Point: Body mechanics (In Progress)     Description: Instruct learner(s) on proper positioning and spine alignment during self-care, functional mobility activities and/or exercises.    Learning Progress Summary           Patient Acceptance, E, NR by CL at 4/9/2019 10:25 AM    Comment:  Pt educated on appropriate safety precautions, HEP, positioning, role of OT, and benefits of therapy.                               User Key     Initials Effective Dates Name Provider Type Discipline     06/22/15 -  Patito James, OT Occupational Therapist OT    CL 04/03/18 -  Zuleyka Julian, OT Occupational Therapist OT    YANICK 08/30/18 -  Lupe Juan, PT Physical Therapist PT                OT Recommendation and Plan     Plan of Care Review  Plan of Care Reviewed With: patient  Plan of Care Reviewed With: patient  Outcome Summary: Pt demo improved independence and activity tolerance Pt CGA for static sitting balance while performing grooming tasks at EOB. Pt tolerated ther-ex well and demo good effort w/ treatment, though limited d/t significant generalized weakness. Recommend cont skilled IPOT POC.   Outcome Measures     Row Name 04/09/19 1025 04/08/19 1525 04/06/19 1448       How much help from another person do you currently need...    Turning from your back to your side while in flat bed without using bedrails?  --  1  -YANICK  1  -DM    Moving from lying on back to sitting on the side of a flat  bed without bedrails?  --  1  -YANICK  1  -DM    Moving to and from a bed to a chair (including a wheelchair)?  --  1  -YANICK  1  -DM    Standing up from a chair using your arms (e.g., wheelchair, bedside chair)?  --  1  -YANICK  1  -DM    Climbing 3-5 steps with a railing?  --  1  -YANICK  1  -DM    To walk in hospital room?  --  1  -YANICK  1  -DM    AM-PAC 6 Clicks Score  --  6  -YANICK  6  -DM       How much help from another is currently needed...    Putting on and taking off regular lower body clothing?  1  -CL  --  --    Bathing (including washing, rinsing, and drying)  1  -CL  --  --    Toileting (which includes using toilet bed pan or urinal)  1  -CL  --  --    Putting on and taking off regular upper body clothing  2  -CL  --  --    Taking care of personal grooming (such as brushing teeth)  2  -CL  --  --    Eating meals  2  -CL  --  --    Score  9  -CL  --  --       Functional Assessment    Outcome Measure Options  AM-PAC 6 Clicks Daily Activity (OT)  -CL  AM-PAC 6 Clicks Basic Mobility (PT)  -YANICK  AM-PAC 6 Clicks Basic Mobility (PT)  -DM    Row Name 04/06/19 1352             How much help from another is currently needed...    Putting on and taking off regular lower body clothing?  1  -JR      Bathing (including washing, rinsing, and drying)  1  -JR      Toileting (which includes using toilet bed pan or urinal)  1  -JR      Putting on and taking off regular upper body clothing  1  -JR      Taking care of personal grooming (such as brushing teeth)  1  -JR      Eating meals  1  -JR      Score  6  -JR         Functional Assessment    Outcome Measure Options  AM-PAC 6 Clicks Daily Activity (OT)  -JR        User Key  (r) = Recorded By, (t) = Taken By, (c) = Cosigned By    Initials Name Provider Type    DM Chloé Herrmann, PT Physical Therapist    JR Patito James, OT Occupational Therapist    CL Zuleyka Julian, OT Occupational Therapist    Lupe Hardy, PT Physical Therapist           Time Calculation:   Time Calculation-  OT     Row Name 04/09/19 1025             Time Calculation- OT    OT Start Time  1025  -CL      OT Received On  04/09/19  -CL      OT Goal Re-Cert Due Date  04/16/19  -CL         Timed Charges    35821 - OT Therapeutic Exercise Minutes  6  -CL      24650 - OT Therapeutic Activity Minutes  15  -CL      21818 - OT Self Care/Mgmt Minutes  4  -CL        User Key  (r) = Recorded By, (t) = Taken By, (c) = Cosigned By    Initials Name Provider Type    CL Zuleyka Julian OT Occupational Therapist        Therapy Charges for Today     Code Description Service Date Service Provider Modifiers Qty    48637871925 HC OT THER PROC EA 15 MIN 4/9/2019 Zuleyka Julian OT GO 1    35388371175 HC OT THERAPEUTIC ACT EA 15 MIN 4/9/2019 Zuleyka Julian OT GO 1    41270473565 HC OT THER SUPP EA 15 MIN 4/9/2019 Zuleyka Julian OT GO 2               Zuleyka Julian OT  4/9/2019

## 2019-04-09 NOTE — THERAPY TREATMENT NOTE
Acute Care - Physical Therapy Treatment Note  [facility]     Patient Name: Roro Guzman  : 1926  MRN: [id number]  Today's Date: 2019  Onset of Illness/Injury or Date of Surgery: 19  Date of Referral to PT: 19  Referring Physician: YODIT Garay MD    Admit Date: 2019    Visit Dx:    ICD-10-CM ICD-9-CM   1. Sepsis due to urinary tract infection (CMS/[facility]) A41.9 038.9    N39.0 995.91     599.0   2. Renal insufficiency N28.9 593.9   3. Dehydration E86.0 276.51   4. Respiratory alkalosis E87.3 276.3   5. Elevated serum lactate dehydrogenase R74.0 790.4   6. Altered mental status, unspecified altered mental status type R41.82 780.97   7. Impaired mobility and ADLs Z74.09 799.89   8. Impaired functional mobility, balance, gait, and endurance Z74.09 V49.89   9. Oropharyngeal dysphagia R13.12 787.22     Patient Active Problem List   Diagnosis   • Sepsis due to urinary tract infection (CMS/[facility])   • Hypertension   • Diabetes mellitus (CMS/[facility])   • Depression   • Dementia   • TAMI (acute kidney injury) (CMS/[facility])   • Lactic acidosis   • Transaminitis       Therapy Treatment    Rehabilitation Treatment Summary     Row Name 19 1150 19 1025          Treatment Time/Intention    Discipline  physical therapist  -KM  occupational therapist  -CL     Document Type  therapy note (daily note)  -KM  therapy note (daily note)  -CL     Subjective Information  no complaints  -KM  complains of;fatigue  -CL     Mode of Treatment  physical therapy  -KM  --     Patient/Family Observations  no family present  -KM  --     Care Plan Review  care plan/treatment goals reviewed;risks/benefits reviewed;patient/other agree to care plan  -KM  --     Therapy Frequency (PT Clinical Impression)  daily  -KM  --     Patient Effort  good  -KM  good  -CL     Existing Precautions/Restrictions  fall  -KM  fall  -CL     Treatment Considerations/Comments  --  Pt declined any OOB activity.   -CL     Recorded by [KM] Valentine  Taylor KRISHNAN, PT 04/09/19 1208 [CL] Zuleyka Julian, OT 04/09/19 1147     Row Name 04/09/19 1025             Vital Signs    Pre Systolic BP Rehab  -- no tele, RN cleared for tx.   -CL      Recorded by [CL] Zuleyka Julian, OT 04/09/19 1147      Row Name 04/09/19 1150 04/09/19 1025          Cognitive Assessment/Intervention- PT/OT    Affect/Mental Status (Cognitive)  confused  -KM  confused  -CL     Orientation Status (Cognition)  oriented to;person  -KM  oriented to;person  -CL     Follows Commands (Cognition)  follows one step commands;75-90% accuracy;physical/tactile prompts required;repetition of directions required  -KM  follows one step commands;75-90% accuracy;repetition of directions required;verbal cues/prompting required [place]  -CL     Cognitive Function (Cognitive)  --  safety deficit  -CL     Safety Deficit (Cognitive)  --  mild deficit;awareness of need for assistance;safety precautions awareness  -CL     Personal Safety Interventions  fall prevention program maintained  -KM  fall prevention program maintained;muscle strengthening facilitated;nonskid shoes/slippers when out of bed;supervised activity  -CL     Recorded by [KM] Taylor Grijalva, PT 04/09/19 1208 [CL] Zuleyka Julian, OT 04/09/19 1147     Row Name 04/09/19 1025             Bed Mobility Assessment/Treatment    Bed Mobility Assessment/Treatment  supine-sit;sit-supine  -CL      Supine-Sit [place] (Bed Mobility)  maximum assist (25% patient effort);2 person assist;verbal cues  -CL      Sit-Supine [place] (Bed Mobility)  dependent (less than 25% patient effort);2 person assist;verbal cues  -CL      Assistive Device (Bed Mobility)  bed rails;draw sheet;head of bed elevated  -CL      Recorded by [CL] Zuleyka Julian, OT 04/09/19 1147      Row Name 04/09/19 1025             Transfer Assessment/Treatment    Comment (Transfers)  Pt declined.   -CL      Recorded by [CL] Zuleyka Julian OT 04/09/19 1147      Row Name 04/09/19 1025             ADL  Assessment/Intervention    07595 - OT Self Care/Mgmt Minutes  4  -CL      BADL Assessment/Intervention  grooming  -CL      Recorded by [CL] Zuleyka Julian, OT 04/09/19 1147      Row Name 04/09/19 1025             Grooming Assessment/Training    [place] Level (Grooming)  wash face, hands;supervision;verbal cues;hair care, combing/brushing;maximum assist (25% patient effort)  -CL      Grooming Position  edge of bed sitting  -CL      Recorded by [CL] Zuleyka Julian, OT 04/09/19 1147      Row Name 04/09/19 1150 04/09/19 1025          Therapeutic Exercise    Therapeutic Exercise  --  seated, upper extremities;seated, lower extremities  -CL     97240 - PT Therapeutic Exercise Minutes  10  -KM  --     40120 - OT Therapeutic Exercise Minutes  --  6  -CL     47825 - OT Therapeutic Activity Minutes  --  15  -CL     Recorded by [KM] Taylor Grijalva, PT 04/09/19 1208 [CL] Zuleyka Julian, OT 04/09/19 1147     Row Name 04/09/19 1150 04/09/19 1025          Upper Extremity Seated Therapeutic Exercise    Performed, Seated Upper Extremity (Therapeutic Exercise)  shoulder flexion/extension;shoulder abduction/adduction;elbow flexion/extension;forearm supination/pronation  -KM  shoulder flexion/extension;elbow flexion/extension;digit flexion/extension  -CL     Exercise Type, Seated Upper Extremity (Therapeutic Exercise)  AAROM (active assistive range of motion)  -KM  AAROM (active assistive range of motion);AROM (active range of motion)  -CL     Sets/Reps Detail, Seated Upper Extremity (Therapeutic Exercise)  1/10  -KM  1/8  -CL     Comment, Seated Upper Extremity (Therapeutic Exercise)  --  BUE  -CL     Recorded by [KM] Taylor Grijalva, PT 04/09/19 1208 [CL] Zuleyka Julian, OT 04/09/19 1147     Row Name 04/09/19 1025             Lower Extremity Seated Therapeutic Exercise    Performed, Seated Lower Extremity (Therapeutic Exercise)  hip flexion/extension;LAQ (long arc quad), knee extension;ankle dorsiflexion/plantarflexion   -CL      Exercise Type, Seated Lower Extremity (Therapeutic Exercise)  AROM (active range of motion);AAROM (active assistive range of motion)  -CL      Sets/Reps Detail, Seated Lower Extremity (Therapeutic Exercise)  1/8  -CL      Comment, Seated Lower Extremity (Therapeutic Exercise)  BLE  -CL      Recorded by [CL] Zuleyka Julian OT 04/09/19 1147      Row Name 04/09/19 1150             Lower Extremity Supine Therapeutic Exercise    Performed, Supine Lower Extremity (Therapeutic Exercise)  hip abduction/adduction;hip external/internal rotation;SAQ (short arc quad) over bolster;ankle dorsiflexion/plantarflexion;heel slides  -KM      Recorded by [KM] Taylor Grijalva, PT 04/09/19 1208      Row Name 04/09/19 1150             Therapeutic Exercise    Lower Extremity (Therapeutic Exercise)  gastroc stretch, bilateral;hamstring stretch, bilateral  -KM      Lower Extremity Range of Motion (Therapeutic Exercise)  ankle dorsiflexion/plantar flexion, bilateral  -KM      Exercise Type (Therapeutic Exercise)  AAROM (active assistive range of motion)  -KM      Position (Therapeutic Exercise)  supine  -KM      Sets/Reps (Therapeutic Exercise)  1/10  -KM      Recorded by [KM] Taylor Grijalva, PT 04/09/19 1208      Row Name 04/09/19 1025             Balance    Balance  static sitting balance  -CL      Recorded by [CL] Zuleyka Julian OT 04/09/19 1147      Row Name 04/09/19 1025             Static Sitting Balance    Level of [place] (Unsupported Sitting, Static Balance)  contact guard assist  -CL      Sitting Position (Unsupported Sitting, Static Balance)  sitting on edge of bed  -CL      Time Able to Maintain Position (Unsupported Sitting, Static Balance)  more than 5 minutes  -CL      Recorded by [CL] Zuleyka Julian, IMER 04/09/19 1147      Row Name 04/09/19 1025             Positioning and Restraints    Pre-Treatment Position  in bed  -CL      Post Treatment Position  bed  -CL      In Bed  notified xochilt;maura;nasima  light within reach;encouraged to call for assist;exit alarm on;side rails up x3;legs elevated;heels elevated  -CL      Recorded by [CL] Zuleyka Julian, OT 04/09/19 1147      Row Name 04/09/19 1150 04/09/19 1025          Pain Scale: Numbers Pre/Post-Treatment    Pain Scale: Numbers, Pretreatment  0/10 - no pain  -KM  0/10 - no pain  -CL     Pain Scale: Numbers, Post-Treatment  0/10 - no pain  -KM  0/10 - no pain  -CL     Recorded by [KM] Taylor Grijalva, PT 04/09/19 1208 [CL] Zuleyka Julian, OT 04/09/19 1147     Row Name 04/09/19 1150             Plan of Care Review    Plan of Care Reviewed With  patient  -KM      Recorded by [ALLAN] Taylor Grijalva, PT 04/09/19 1208        User Key  (r) = Recorded By, (t) = Taken By, (c) = Cosigned By    Initials Name Effective Dates Discipline     Taylor Grijalva, PT 06/19/15 -  PT    CL Zuleyka Julian, OT 04/03/18 -  OT                   Physical Therapy Education     Title: PT OT SLP Therapies (In Progress)     Topic: Physical Therapy (In Progress)     Point: Mobility training (In Progress)     Learning Progress Summary           Patient Acceptance, E, NR by  at 4/9/2019 11:50 AM    Acceptance, E,D, NR by DM at 4/6/2019  4:25 PM                   Point: Home exercise program (In Progress)     Learning Progress Summary           Patient Acceptance, E, NR by KM at 4/9/2019 11:50 AM    Acceptance, E,D, NR by DM at 4/6/2019  4:25 PM                   Point: Body mechanics (In Progress)     Learning Progress Summary           Patient Acceptance, E, NR by  at 4/9/2019 11:50 AM    Acceptance, E,D, NR by DM at 4/6/2019  4:25 PM                   Point: Precautions (In Progress)     Learning Progress Summary           Patient Acceptance, E, NR by  at 4/9/2019 11:50 AM    Acceptance, E,D, NR by DM at 4/6/2019  4:25 PM                               User Key     Initials Effective Dates Name Provider Type Discipline     06/19/15 -  Taylor Grijalva, PT Physical  Therapist PT    DM 06/19/15 -  Chloé Herrmann, PT Physical Therapist PT                PT Recommendation and Plan  Therapy Frequency (PT Clinical Impression): daily  Plan of Care Reviewed With: patient  Outcome Summary: Pt is pleasantly confused and cooperative with therex requiring verbal and tactile cues for AAROM  Outcome Measures     Row Name 04/09/19 1150 04/09/19 1025 04/08/19 1525       How much help from another person do you currently need...    Turning from your back to your side while in flat bed without using bedrails?  1  -KM  --  1  -YANICK    Moving from lying on back to sitting on the side of a flat bed without bedrails?  1  -KM  --  1  -YANICK    Moving to and from a bed to a chair (including a wheelchair)?  1  -KM  --  1  -YANICK    Standing up from a chair using your arms (e.g., wheelchair, bedside chair)?  1  -KM  --  1  -YANICK    Climbing 3-5 steps with a railing?  1  -KM  --  1  -YANICK    To walk in hospital room?  1  -KM  --  1  -YANICK    AM-PAC 6 Clicks Score  6  -KM  --  6  -YANICK       How much help from another is currently needed...    Putting on and taking off regular lower body clothing?  --  1  -CL  --    Bathing (including washing, rinsing, and drying)  --  1  -CL  --    Toileting (which includes using toilet bed pan or urinal)  --  1  -CL  --    Putting on and taking off regular upper body clothing  --  2  -CL  --    Taking care of personal grooming (such as brushing teeth)  --  2  -CL  --    Eating meals  --  2  -CL  --    Score  --  9  -CL  --       Functional Assessment    Outcome Measure Options  AM-PAC 6 Clicks Basic Mobility (PT)  -KM  AM-PAC 6 Clicks Daily Activity (OT)  -CL  AM-PAC 6 Clicks Basic Mobility (PT)  -YANICK    Row Name 04/06/19 1448 04/06/19 1352          How much help from another person do you currently need...    Turning from your back to your side while in flat bed without using bedrails?  1  -DM  --     Moving from lying on back to sitting on the side of a flat bed without bedrails?   1  -DM  --     Moving to and from a bed to a chair (including a wheelchair)?  1  -DM  --     Standing up from a chair using your arms (e.g., wheelchair, bedside chair)?  1  -DM  --     Climbing 3-5 steps with a railing?  1  -DM  --     To walk in hospital room?  1  -DM  --     AM-PAC 6 Clicks Score  6  -DM  --        How much help from another is currently needed...    Putting on and taking off regular lower body clothing?  --  1  -JR     Bathing (including washing, rinsing, and drying)  --  1  -JR     Toileting (which includes using toilet bed pan or urinal)  --  1  -JR     Putting on and taking off regular upper body clothing  --  1  -JR     Taking care of personal grooming (such as brushing teeth)  --  1  -JR     Eating meals  --  1  -JR     Score  --  6  -JR        Functional Assessment    Outcome Measure Options  AM-PAC 6 Clicks Basic Mobility (PT)  -DM  AM-PAC 6 Clicks Daily Activity (OT)  -JR       User Key  (r) = Recorded By, (t) = Taken By, (c) = Cosigned By    Initials Name Provider Type    Taylor Esquivel, PT Physical Therapist    Chloé Gillette, PT Physical Therapist    Patito Ray, OT Occupational Therapist    Zuleyka Alfred, OT Occupational Therapist    Lupe Hardy, PT Physical Therapist         Time Calculation:   PT Charges     Row Name 04/09/19 1150             Time Calculation    Start Time  1150  -KM      PT Received On  04/09/19  -KM      PT Goal Re-Cert Due Date  04/16/19  -KM         Time Calculation- PT    Total Timed Code Minutes- PT  10 minute(s)  -KM         Timed Charges    91879 - PT Therapeutic Exercise Minutes  10  -KM        User Key  (r) = Recorded By, (t) = Taken By, (c) = Cosigned By    Initials Name Provider Type    Taylor Esquivel, PT Physical Therapist        Therapy Charges for Today     Code Description Service Date Service Provider Modifiers Qty    [id number]  PT THER PROC EA 15 MIN 4/9/2019 Taylor Grijalva, PT GP 1     [id number]  PT THER SUPP EA 15 MIN 4/9/2019 Taylor Grijalva, PT GP 1          PT G-Codes  Outcome Measure Options: AM-PAC 6 Clicks Basic Mobility (PT)  AM-PAC 6 Clicks Score: 6  Score: 9    Taylor Grijalva, PT  4/9/2019

## 2019-04-09 NOTE — PLAN OF CARE
Problem: Skin Injury Risk (Adult)  Goal: Skin Health and Integrity  Outcome: Ongoing (interventions implemented as appropriate)   04/09/19 1557   Skin Injury Risk (Adult)   Skin Health and Integrity making progress toward outcome       Problem: Fall Risk (Adult)  Goal: Absence of Fall  Outcome: Ongoing (interventions implemented as appropriate)   04/09/19 1557   Fall Risk (Adult)   Absence of Fall making progress toward outcome       Problem: Patient Care Overview  Goal: Plan of Care Review  Outcome: Ongoing (interventions implemented as appropriate)   04/06/19 0634 04/09/19 1218 04/09/19 1532   Coping/Psychosocial   Plan of Care Reviewed With --  --  patient   Plan of Care Review   Progress --  improving --    OTHER   Outcome Summary --  --  --    Coping/Psychosocial   Patient Agreement with Plan of Care unable to participate --  --     04/09/19 1557   Coping/Psychosocial   Plan of Care Reviewed With --    Plan of Care Review   Progress --    OTHER   Outcome Summary pt pleasantly confused, cooperative PT/OT worked with pt today son here this afternoon will cont to monitor   Coping/Psychosocial   Patient Agreement with Plan of Care --      Goal: Discharge Needs Assessment  Outcome: Ongoing (interventions implemented as appropriate)      Problem: Sepsis/Septic Shock (Adult)  Goal: Signs and Symptoms of Listed Potential Problems Will be Absent, Minimized or Managed (Sepsis/Septic Shock)  Outcome: Ongoing (interventions implemented as appropriate)   04/06/19 1743   Goal/Outcome Evaluation   Problems Assessed (Sepsis) all   Problems Present (Sepsis) undernutrition;situational response       Problem: Patient Care Overview  Goal: Plan of Care Review  Outcome: Ongoing (interventions implemented as appropriate)   04/09/19 1218 04/09/19 1532 04/09/19 1557   Coping/Psychosocial   Plan of Care Reviewed With --  patient --    Plan of Care Review   Progress improving --  --    OTHER   Outcome Summary --  --  pt pleasantly  confused, cooperative PT/OT worked with pt today son here this afternoon will cont to monitor

## 2019-04-09 NOTE — PROGRESS NOTES
[facility] Medicine Services  PROGRESS NOTE    Patient Name: Roro Guzman  : 1926  MRN: [id number]    Date of Admission: 2019  Length of Stay: 4  Primary Care Physician: Maria Puentes APRN    Subjective   Subjective     CC:  Sepsis 2/2 UTI    HPI:  No overnight events. BP improved. Patient very pleasnat this morning.     Review of Systems  Unable to assess given mental status    Otherwise ROS is negative except as mentioned in the HPI.    Objective   Objective     Vital Signs:   Temp:  [97.9 °F (36.6 °C)-98.8 °F (37.1 °C)] 98.8 °F (37.1 °C)  Heart Rate:  [62-74] 69  Resp:  [16-18] 16  BP: (142-172)/(64-75) 153/67        Physical Exam:  GEN- no acute distress noted, resting in bed, awake  HEENT- atraumatic, normocephlic, eomi  NECK- supple, trachea midline, no masses  RESP: ctab, normal effort  CV: no murmurs, s1/s2, rrr  MSK: no edema noted, spontaneous movement of all extremities  NEURO: alert, oriented, no focal deficits  SKIN: no rashes  PSYCH: pleasantly confused       Results Reviewed:  I have personally reviewed current lab, radiology, and data and agree.    Results from last 7 days   Lab Units 19  03319  0343 19  0352   WBC 10*3/mm3 7.85 12.17* 13.77*   HEMOGLOBIN g/dL 12.3 11.8 13.0   HEMATOCRIT % 36.9 36.9 42.1   PLATELETS 10*3/mm3 214 218 284     Results from last 7 days   Lab Units 19  0331 19  1707 19  0343  19  0352 19  0858   SODIUM mmol/L 143  --  142  --  146 147*   POTASSIUM mmol/L 3.8 3.4* 3.4*   < > 2.7* 3.9   CHLORIDE mmol/L 115*  --  116*  --  115* 111*   CO2 mmol/L 20.0  --  21.0  --  21.0 24.0   BUN mg/dL 24*  --  29*  --  30* 39*   CREATININE mg/dL 0.65  --  0.75  --  0.91 1.35*   GLUCOSE mg/dL 90  --  110*  --  143* 177*   CALCIUM mg/dL 8.1*  --  8.0*  --  8.8 10.8*   ALT (SGPT) U/L 58*  --  70*  --   --  86*   AST (SGOT) U/L 33  --  47*  --   --  66*    < > = values in this interval not displayed.      Estimated Creatinine Clearance: 42.1 mL/min (by C-G formula based on SCr of 0.65 mg/dL).    No results found for: BNP    Microbiology Results Abnormal     Procedure Component Value - Date/Time    Blood Culture - Blood, Arm, Right [[id number]] Collected:  04/05/19 0945    Lab Status:  Preliminary result Specimen:  Blood from Arm, Right Updated:  04/09/19 1030     Blood Culture No growth at 4 days    Blood Culture - Blood, Arm, Right [[id number]] Collected:  04/05/19 0950    Lab Status:  Preliminary result Specimen:  Blood from Arm, Right Updated:  04/09/19 1030     Blood Culture No growth at 4 days    Urine Culture - Urine, Urine, Catheter [[id number]]  (Abnormal)  (Susceptibility) Collected:  04/05/19 0858    Lab Status:  Final result Specimen:  Urine, Catheter Updated:  04/08/19 1139     Urine Culture >100,000 CFU/mL Escherichia coli      >100,000 CFU/mL Proteus mirabilis    Susceptibility      Escherichia coli     DAR     Ampicillin Susceptible     Ampicillin + Sulbactam Susceptible     Aztreonam Susceptible     Cefepime Susceptible     Cefotaxime Susceptible     Ceftriaxone Susceptible     Cefuroxime sodium Susceptible     Cephalothin Resistant     Ciprofloxacin Resistant     Ertapenem Susceptible     Gentamicin Susceptible     Levofloxacin Resistant     Meropenem Susceptible     Nitrofurantoin Susceptible     Piperacillin + Tazobactam Susceptible     Tetracycline Resistant     Tobramycin Susceptible     Trimethoprim + Sulfamethoxazole Susceptible                Susceptibility      Proteus mirabilis     ADR     Ampicillin Resistant     Ampicillin + Sulbactam Susceptible     Aztreonam Susceptible     Cefepime Susceptible     Cefotaxime Susceptible     Ceftriaxone Susceptible     Cefuroxime sodium Susceptible     Cephalothin Susceptible     Ciprofloxacin Resistant     Ertapenem Susceptible     Gentamicin Susceptible     Levofloxacin Intermediate     Meropenem Susceptible     Piperacillin + Tazobactam Susceptible      Tobramycin Susceptible     Trimethoprim + Sulfamethoxazole Resistant                          Imaging Results (last 24 hours)     Procedure Component Value Units Date/Time    FL Video Swallow With Speech [[id number]] Collected:  04/08/19 1441     Updated:  04/08/19 1657    Narrative:       EXAMINATION: FL VIDEO SWALLOW W SPEECH-     INDICATION: DYSPHAGIA, OROPHARYNGEAL, HAS ATTRIBUTABLE CAUSE     TECHNIQUE: 1 minute and 12 seconds of fluoroscopic time was used for  this exam. 1 associated image was saved. The patient was evaluated in  the seated lateral position while taking a variety of consistencies of  barium by mouth under the direction of speech pathology.     COMPARISON: NONE     FINDINGS: There was penetration that cleared without aspiration with  sips of thin barium. There was no penetration and no aspiration with  nectar, or pudding consistency barium.          Impression:       Fluoroscopy provided for a modified barium swallow. Please  see speech therapy report for full details and recommendations.         This report was finalized on 4/8/2019 4:53 PM by Dr. Derek Contreras MD.       US Renal Bilateral [[id number]] Collected:  04/08/19 1601     Updated:  04/08/19 1609    Narrative:       EXAMINATION: US RENAL, BILATERAL-04/08/2019:     INDICATION: TAMI; UTI and sepsis;  r/o obstruction; A41.9-Sepsis,  unspecified organism; N39.0-Urinary tract infection, site not specified;  N28.9-Disorder of kidney and ureter, unspecified; E86.0-Dehydration;  E87.3-Alkalosis; R74.0-Nonspecific elevation of levels of transaminase  and lactic acid dehydrogenase (ldh); R41.82-Altered mental status,  unspecified; Z74.09-Other reduced mobility; Z74.09-Other reduced.       TECHNIQUE: Ultrasound of the kidneys and urinary bladder.     COMPARISON: NONE.     FINDINGS:  The right kidney measures 9.3 cm in length without evidence  of hydronephrosis, contour deforming mass or obvious calculi.     The left kidney measures 8.5 cm in  length without evidence of  hydronephrosis, contour deforming mass or obvious calculi.     The urinary bladder is moderately distended with layering echogenic  material without significant internal flow on Doppler interrogation  likely representing debris.     Incidental note of echogenic foci with shadowing in the gallbladder  indicating cholelithiasis.       Impression:       1.  No hydronephrosis.  2.  Layering echogenic material of likely debris within the urinary  bladder. This does not have internal flow on Doppler interrogation.  3.  Incidental note of cholelithiasis.     D:  2019  E:  2019            This report was finalized on 2019 4:06 PM by Dr. Leo Lombardi.             Results for orders placed during the hospital encounter of 02/07/15   CONVERTED (HISTORICAL) ECHO    Narrative Patient:      SABRINA CABA    [facility] Rec#:     [id number]               :          1926            Date:         2015            Age:          88y                   Height:       162.56 cm / 64.0 in  Weight:       58.51 kg / 129.0 lbs  Sex:          F                     BSA:          1.62  Room#:        3347-1                    Sonographer:  Laurie Jolly RDCS, RDMS  Referring:    BELCASTROMARISA  Reading:      Cory Culver MD  Primary:      Chuckie Burt  Unit:         3 Main  ______________________________________________________________________    Transthoracic Echocardiogram    Indication:  ASYMPTOMATIC BRADYCARDIA  BP:           187/71    Conclusions  1. The study quality is technically difficult; all measurements are  approximate.   2. Global left ventricular wall motion and contractility are within  normal limits.  3. The left ventricle appears hyperdynamic.  4. There is an E to A reversal in the mitral valve flow pattern  suggestive of diastolic dysfunction.  5. The right ventricle is mildly dilated.   6. There is mild aortic regurgitation.   7. There is no evidence of mitral valve prolapse.  8.  No pulmonary hypertension is noted.  9. There is no evidence of pericardial effusion.  10. There is no dilatation of the aortic root.  11. The venous system appears normal.    Findings       Technical Comments:  The study quality is technically difficult.  The study is technically  limited due to poor acoustic windows.      Left Ventricle:  The left ventricular chamber size is normal. Global left ventricular  wall motion and contractility are within normal limits. The left  ventricle appears hyperdynamic. The estimated ejection fraction is  greater than 65%.  There is an E to A reversal in the mitral valve flow  pattern suggestive of diastolic dysfunction.     Left Atrium:  The left atrial chamber size is normal.     Right Ventricle:  The right ventricle is mildly dilated.  The right ventricular global  systolic function is normal.   Right Atrium:  The right atrial cavity size is normal. No atrial septal defect is  visualized.     Aortic Valve:  The aortic valve is trileaflet. The aortic valve leaflets are mildly  thickened. Mild aortic cusp sclerosis is present. There is aortic  annular calcification. There is mild aortic regurgitation.  There is no  evidence of aortic stenosis.     Mitral Valve:  The mitral valve leaflets appear normal. There is no evidence of mitral  valve prolapse. There is no evidence of mitral regurgitation. There is  no evidence of mitral stenosis.     Tricuspid Valve:  The tricuspid valve leaflets are normal.  There is a trace tricuspid  regurgitation.  No pulmonary hypertension is noted.     Pulmonic Valve:  The pulmonic valve appears normal. There is a trace pulmonic  regurgitation.      Pericardium:  There is no evidence of pericardial effusion.     Aorta:  There is no dilatation of the aortic root.     Pulmonary Artery:  The main pulmonary artery appears normal.     Venous:  The venous system appears normal.     Measurements   Chambers  Name                    Value           RVIDd (AP)  2D           2.9 cm          IVSd (2D)               1 cm            LVIDd (2D)              4 cm            LVIDs (2D)              2.2 cm          LVPWd (2D)              1 cm            EF (2D)                 80 %            Ao root diameter (2D)   2.7 cm          LA dimension (AP) 2D    3 cm            LA:Ao ratio (2D)        1.1 ratio         Diastolic/Systolic Function  Name                    Value           MV E-wave Vmax          0.43 m/sec      MV A-wave Vmax          0.81 m/sec      MV E:A ratio            0.5 ratio       LV septal e' Vmax       0.08 m/sec      LV lateral e' Vmax      0.05 m/sec      LV E:e' septal ratio    5.2 ratio       LV E:e' lateral ratio   8.3 ratio         Aortic Valve  Name                    Value           AV Vmax                 1.25 m/sec      AV peak gradient        6 mmHg          AR PHT                  768 msec        AR peak gradient        73 mmHg           Pulmonic Valve/Qp:Qs  Name                    Value           PV Vmax                 0.85 m/sec      PV peak gradient        3 mmHg          PV acceleration time    35 msec           Electronically signed by: Cory Culver MD on 02/09/2015 17:21:27       I have reviewed the medications:    Current Facility-Administered Medications:   •  acetaminophen (TYLENOL) tablet 650 mg, 650 mg, Oral, BID, Elaina Frost MD, 650 mg at 04/09/19 0930  •  artificial tears (LUBRIFRESH P.M.) ophthalmic ointment, , Topical, Q1H PRN, Elaina Frost MD  •  carvedilol (COREG) tablet 12.5 mg, 12.5 mg, Oral, Q12H, Rosanne Rankin MD, 12.5 mg at 04/09/19 0929  •  cefepime (MAXIPIME) 1 g/100 mL 0.9% NS IVPB (mbp), 1 g, Intravenous, Q12H, Vishal Garay MD, 1 g at 04/09/19 1101  •  famotidine (PEPCID) tablet 20 mg, 20 mg, Oral, Daily, Elaina Frost MD, 20 mg at 04/09/19 0930  •  heparin (porcine) 5000 UNIT/ML injection 5,000 Units, 5,000 Units, Subcutaneous, Q8H, Case, Porsha V., DO, 5,000 Units at 04/09/19  0637  •  labetalol (NORMODYNE,TRANDATE) injection 20 mg, 20 mg, Intravenous, Q10 Min PRN, Rosanne Rankin MD, 20 mg at 04/07/19 1104  •  magnesium sulfate 4 gram infusion - Mg less than or equal to 1mg/dL, 4 g, Intravenous, PRN **OR** magnesium sulfate 3 gram infusion (1gm x 3) - Mg 1.1 - 1.5 mg/dL, 1 g, Intravenous, PRN **OR** Magnesium Sulfate 2 gram infusion- Mg 1.6 - 1.9 mg/dL, 2 g, Intravenous, PRN, Elaina Frost MD  •  NIFEdipine (PROCARDIA) capsule 20 mg, 20 mg, Oral, Q8H, Rosanne Rankin MD, 20 mg at 04/09/19 0637  •  nystatin (MYCOSTATIN) powder 1 application, 1 application, Topical, BID, Elaina Frost MD, 1 application at 04/09/19 0931  •  potassium chloride (MICRO-K) CR capsule 40 mEq, 40 mEq, Oral, PRN **OR** potassium chloride (KLOR-CON) packet 40 mEq, 40 mEq, Oral, PRN, 40 mEq at 04/08/19 0104 **OR** potassium chloride 10 mEq in 100 mL IVPB, 10 mEq, Intravenous, Q1H PRN, Elaina Frost MD  •  potassium chloride 10 mEq in 100 mL IVPB, 10 mEq, Intravenous, Q1H PRN, Armand Dumont, APRN, Last Rate: 100 mL/hr at 04/07/19 1153, 10 mEq at 04/07/19 1153  •  sodium chloride 0.9 % flush 10 mL, 10 mL, Intravenous, PRN, Tee Simons MD  •  sodium chloride 0.9 % flush 3 mL, 3 mL, Intravenous, Q12H, Case, Porsha V., DO, 3 mL at 04/07/19 0903  •  sodium chloride 0.9 % flush 3-10 mL, 3-10 mL, Intravenous, PRN, Case, Porsha V., DO  •  sodium chloride 0.9 % infusion, 50 mL/hr, Intravenous, Continuous, Elaina Frost MD, Last Rate: 50 mL/hr at 04/07/19 1422, 50 mL/hr at 04/07/19 1422  •  venlafaxine (EFFEXOR) tablet 37.5 mg, 37.5 mg, Oral, BID With Meals, Elaina Frost MD, 37.5 mg at 04/09/19 0930      Assessment/Plan   Assessment / Plan     Active Hospital Problems    Diagnosis POA   • **Sepsis due to urinary tract infection (CMS/HCC) [A41.9, N39.0] Yes   • Hypertension [I10] Yes   • Diabetes mellitus (CMS/HCC) [E11.9] Yes   • Depression [F32.9] Yes   • Dementia  [F03.90] Yes   • TAMI (acute kidney injury) (CMS/HCC) [N17.9] Yes   • Lactic acidosis [E87.2] Yes   • Transaminitis [R74.0] Yes          Brief Hospital Course to date:  Roro Guzman is a 89-year-old woman hospitalized with sepsis secondary to recurrent urinary tract infection with associated lactic acidosis and renal insufficiency. Patient was initially admitted to the ICU for her care, and quickly improved with IV hydration as well as IV abx. Patient transferred to the floor 4/7 for further care.     HTN urgency  -- noted issues with uncontrolled BP during admission, home anti-HTN initially held but resumed on HD 2 (amlodipine), did require cardene gtt in ICU   -- BP continues to be elevated; better control with coreg/nifedipine     Sepsis secondary to UTI  Lactic acidosis, resolved  -- urine culture growing 2 organisms- >100k Ecoli and >100k Proteus   -- currently on cefepime, ID now following, may have option to transition to oral omnicef     Hypokalemia  -- replace per protocol  -- Mg normal     TAMI, resolved  -- improved with IVF    Transaminitis, resolving   -- likely related to sepsis, improving, monitor       DVT Prophylaxis:  heparin    Disposition: I expect the patient to be discharged to NH facility at [facility]- may be ready to go as early as tomorrow- pending final abx plan and continued improvement in BP    CODE STATUS:   Code Status and Medical Interventions:   Ordered at: 04/05/19 1104     Limited Support to NOT Include:    Intubation    Cardioversion/Defibrillation     Level Of Support Discussed With:    Health Care Surrogate     Code Status:    No CPR     Medical Interventions (Level of Support Prior to Arrest):    Limited         Electronically signed by Rosanne Rankin MD, 04/09/19, 11:23 AM.

## 2019-04-09 NOTE — PLAN OF CARE
Problem: Patient Care Overview  Goal: Plan of Care Review  Outcome: Ongoing (interventions implemented as appropriate)   04/09/19 1150   Coping/Psychosocial   Plan of Care Reviewed With patient   Plan of Care Review   Progress improving   SLP treatment completed. Will sign-off as no further SLP dysphagia needs at this time. Please see note for further details and recommendations.

## 2019-04-09 NOTE — PLAN OF CARE
Problem: Skin Injury Risk (Adult)  Goal: Skin Health and Integrity  Outcome: Ongoing (interventions implemented as appropriate)      Problem: Fall Risk (Adult)  Goal: Absence of Fall  Outcome: Ongoing (interventions implemented as appropriate)      Problem: Patient Care Overview  Goal: Plan of Care Review  Outcome: Ongoing (interventions implemented as appropriate)    Goal: Individualization and Mutuality  Outcome: Ongoing (interventions implemented as appropriate)    Goal: Discharge Needs Assessment  Outcome: Ongoing (interventions implemented as appropriate)    Goal: Interprofessional Rounds/Family Conf  Outcome: Ongoing (interventions implemented as appropriate)      Problem: Sepsis/Septic Shock (Adult)  Goal: Signs and Symptoms of Listed Potential Problems Will be Absent, Minimized or Managed (Sepsis/Septic Shock)  Outcome: Ongoing (interventions implemented as appropriate)      Problem: Patient Care Overview  Goal: Plan of Care Review  Outcome: Ongoing (interventions implemented as appropriate)   04/08/19 1525 04/08/19 2000 04/09/19 0521   Coping/Psychosocial   Plan of Care Reviewed With --  patient --    Plan of Care Review   Progress improving --  --    OTHER   Outcome Summary --  --  VSS. Resting well on shift. UOP adaquate. Confused. Pills crushed w applesauce. Continue to monitor. IV ABX infusing.

## 2019-04-10 LAB
ALBUMIN SERPL-MCNC: 2.9 G/DL (ref 3.5–5.2)
ALBUMIN/GLOB SERPL: 0.8 G/DL
ALP SERPL-CCNC: 79 U/L (ref 39–117)
ALT SERPL W P-5'-P-CCNC: 37 U/L (ref 1–33)
ANION GAP SERPL CALCULATED.3IONS-SCNC: 12 MMOL/L
AST SERPL-CCNC: 34 U/L (ref 1–32)
BACTERIA SPEC AEROBE CULT: NORMAL
BACTERIA SPEC AEROBE CULT: NORMAL
BILIRUB SERPL-MCNC: 0.3 MG/DL (ref 0.2–1.2)
BUN BLD-MCNC: 17 MG/DL (ref 8–23)
BUN/CREAT SERPL: 33.3 (ref 7–25)
CALCIUM SPEC-SCNC: 7.9 MG/DL (ref 8.2–9.6)
CHLORIDE SERPL-SCNC: 110 MMOL/L (ref 98–107)
CO2 SERPL-SCNC: 22 MMOL/L (ref 22–29)
CREAT BLD-MCNC: 0.51 MG/DL (ref 0.57–1)
GFR SERPL CREATININE-BSD FRML MDRD: 113 ML/MIN/1.73
GLOBULIN UR ELPH-MCNC: 3.7 GM/DL
GLUCOSE BLD-MCNC: 98 MG/DL (ref 65–99)
POTASSIUM BLD-SCNC: 3 MMOL/L (ref 3.5–5.2)
PROT SERPL-MCNC: 6.6 G/DL (ref 6–8.5)
SODIUM BLD-SCNC: 144 MMOL/L (ref 136–145)

## 2019-04-10 PROCEDURE — 80053 COMPREHEN METABOLIC PANEL: CPT | Performed by: INTERNAL MEDICINE

## 2019-04-10 PROCEDURE — 25010000002 HEPARIN (PORCINE) PER 1000 UNITS: Performed by: INTERNAL MEDICINE

## 2019-04-10 PROCEDURE — 99232 SBSQ HOSP IP/OBS MODERATE 35: CPT | Performed by: INTERNAL MEDICINE

## 2019-04-10 RX ORDER — NIFEDIPINE 10 MG/1
30 CAPSULE ORAL EVERY 8 HOURS SCHEDULED
Status: DISCONTINUED | OUTPATIENT
Start: 2019-04-10 | End: 2019-04-11 | Stop reason: HOSPADM

## 2019-04-10 RX ADMIN — VENLAFAXINE 37.5 MG: 37.5 TABLET ORAL at 08:59

## 2019-04-10 RX ADMIN — SODIUM CHLORIDE, PRESERVATIVE FREE 3 ML: 5 INJECTION INTRAVENOUS at 23:15

## 2019-04-10 RX ADMIN — ACETAMINOPHEN 650 MG: 325 TABLET, FILM COATED ORAL at 20:41

## 2019-04-10 RX ADMIN — POTASSIUM CHLORIDE 40 MEQ: 1.5 POWDER, FOR SOLUTION ORAL at 18:09

## 2019-04-10 RX ADMIN — CARVEDILOL 12.5 MG: 12.5 TABLET, FILM COATED ORAL at 09:03

## 2019-04-10 RX ADMIN — NYSTATIN 1 APPLICATION: 100000 POWDER TOPICAL at 09:04

## 2019-04-10 RX ADMIN — HEPARIN SODIUM 5000 UNITS: 5000 INJECTION INTRAVENOUS; SUBCUTANEOUS at 20:41

## 2019-04-10 RX ADMIN — FAMOTIDINE 20 MG: 20 TABLET ORAL at 09:03

## 2019-04-10 RX ADMIN — CEFEPIME HYDROCHLORIDE 1 G: 1 INJECTION, POWDER, FOR SOLUTION INTRAMUSCULAR; INTRAVENOUS at 00:13

## 2019-04-10 RX ADMIN — NIFEDIPINE 30 MG: 10 CAPSULE ORAL at 13:39

## 2019-04-10 RX ADMIN — VENLAFAXINE 37.5 MG: 37.5 TABLET ORAL at 18:51

## 2019-04-10 RX ADMIN — CEFEPIME HYDROCHLORIDE 1 G: 1 INJECTION, POWDER, FOR SOLUTION INTRAMUSCULAR; INTRAVENOUS at 23:19

## 2019-04-10 RX ADMIN — POTASSIUM CHLORIDE 40 MEQ: 1.5 POWDER, FOR SOLUTION ORAL at 23:15

## 2019-04-10 RX ADMIN — NYSTATIN 1 APPLICATION: 100000 POWDER TOPICAL at 23:16

## 2019-04-10 RX ADMIN — NIFEDIPINE 30 MG: 10 CAPSULE ORAL at 20:44

## 2019-04-10 RX ADMIN — POTASSIUM CHLORIDE 40 MEQ: 1.5 POWDER, FOR SOLUTION ORAL at 13:39

## 2019-04-10 RX ADMIN — SODIUM CHLORIDE 50 ML/HR: 900 INJECTION INTRAVENOUS at 23:15

## 2019-04-10 RX ADMIN — CEFEPIME HYDROCHLORIDE 1 G: 1 INJECTION, POWDER, FOR SOLUTION INTRAMUSCULAR; INTRAVENOUS at 12:30

## 2019-04-10 RX ADMIN — HEPARIN SODIUM 5000 UNITS: 5000 INJECTION INTRAVENOUS; SUBCUTANEOUS at 06:35

## 2019-04-10 RX ADMIN — SODIUM CHLORIDE, PRESERVATIVE FREE 3 ML: 5 INJECTION INTRAVENOUS at 09:04

## 2019-04-10 RX ADMIN — ACETAMINOPHEN 650 MG: 325 TABLET, FILM COATED ORAL at 09:03

## 2019-04-10 RX ADMIN — NIFEDIPINE 20 MG: 10 CAPSULE, LIQUID FILLED ORAL at 06:35

## 2019-04-10 RX ADMIN — CARVEDILOL 12.5 MG: 12.5 TABLET, FILM COATED ORAL at 20:43

## 2019-04-10 RX ADMIN — HEPARIN SODIUM 5000 UNITS: 5000 INJECTION INTRAVENOUS; SUBCUTANEOUS at 13:40

## 2019-04-10 NOTE — PROGRESS NOTES
[facility] Medicine Services  PROGRESS NOTE    Patient Name: Roro Guzman  : 1926  MRN: [id number]    Date of Admission: 2019  Length of Stay: 5  Primary Care Physician: Maria Puentes APRN    Subjective   Subjective     CC:  Sepsis 2/2 UTI    HPI:  No overnight events. BP remains high. Attempted to discuss allergy to ACE with patient, she does not ever recall taking this medication but is a poor historian.    Review of Systems  Unable to assess given mental status    Otherwise ROS is negative except as mentioned in the HPI.    Objective   Objective     Vital Signs:   Temp:  [97.4 °F (36.3 °C)-98.7 °F (37.1 °C)] 98.6 °F (37 °C)  Heart Rate:  [59-73] 59  Resp:  [16-18] 18  BP: (151-187)/(62-78) 159/71        Physical Exam:  GEN- no acute distress noted, resting in bed, awake  HEENT- atraumatic, normocephlic, eomi  NECK- supple, trachea midline, no masses  RESP: ctab, normal effort  CV: no murmurs, s1/s2, rrr  MSK: no edema noted, spontaneous movement of all extremities  NEURO: alert, oriented, no focal deficits  SKIN: no rashes  PSYCH: pleasantly confused       Results Reviewed:  I have personally reviewed current lab, radiology, and data and agree.    Results from last 7 days   Lab Units 19  0331 19  0343 19  0352   WBC 10*3/mm3 7.85 12.17* 13.77*   HEMOGLOBIN g/dL 12.3 11.8 13.0   HEMATOCRIT % 36.9 36.9 42.1   PLATELETS 10*3/mm3 214 218 284     Results from last 7 days   Lab Units 04/10/19  0630 19  0331 19  1707 19  0343   SODIUM mmol/L 144 143  --  142   POTASSIUM mmol/L 3.0* 3.8 3.4* 3.4*   CHLORIDE mmol/L 110* 115*  --  116*   CO2 mmol/L 22.0 20.0  --  21.0   BUN mg/dL 17 24*  --  29*   CREATININE mg/dL 0.51* 0.65  --  0.75   GLUCOSE mg/dL 98 90  --  110*   CALCIUM mg/dL 7.9* 8.1*  --  8.0*   ALT (SGPT) U/L 37* 58*  --  70*   AST (SGOT) U/L 34* 33  --  47*     Estimated Creatinine Clearance: 42.1 mL/min (A) (by C-G formula based on  SCr of 0.51 mg/dL (L)).    No results found for: BNP    Microbiology Results Abnormal     Procedure Component Value - Date/Time    Blood Culture - Blood, Arm, Right [[id number]] Collected:  04/05/19 0945    Lab Status:  Final result Specimen:  Blood from Arm, Right Updated:  04/10/19 1030     Blood Culture No growth at 5 days    Blood Culture - Blood, Arm, Right [[id number]] Collected:  04/05/19 0950    Lab Status:  Final result Specimen:  Blood from Arm, Right Updated:  04/10/19 1030     Blood Culture No growth at 5 days    Urine Culture - Urine, Urine, Catheter [[id number]]  (Abnormal)  (Susceptibility) Collected:  04/05/19 0858    Lab Status:  Final result Specimen:  Urine, Catheter Updated:  04/08/19 1139     Urine Culture >100,000 CFU/mL Escherichia coli      >100,000 CFU/mL Proteus mirabilis    Susceptibility      Escherichia coli     DAR     Ampicillin Susceptible     Ampicillin + Sulbactam Susceptible     Aztreonam Susceptible     Cefepime Susceptible     Cefotaxime Susceptible     Ceftriaxone Susceptible     Cefuroxime sodium Susceptible     Cephalothin Resistant     Ciprofloxacin Resistant     Ertapenem Susceptible     Gentamicin Susceptible     Levofloxacin Resistant     Meropenem Susceptible     Nitrofurantoin Susceptible     Piperacillin + Tazobactam Susceptible     Tetracycline Resistant     Tobramycin Susceptible     Trimethoprim + Sulfamethoxazole Susceptible                Susceptibility      Proteus mirabilis     DAR     Ampicillin Resistant     Ampicillin + Sulbactam Susceptible     Aztreonam Susceptible     Cefepime Susceptible     Cefotaxime Susceptible     Ceftriaxone Susceptible     Cefuroxime sodium Susceptible     Cephalothin Susceptible     Ciprofloxacin Resistant     Ertapenem Susceptible     Gentamicin Susceptible     Levofloxacin Intermediate     Meropenem Susceptible     Piperacillin + Tazobactam Susceptible     Tobramycin Susceptible     Trimethoprim + Sulfamethoxazole Resistant                           Imaging Results (last 24 hours)     ** No results found for the last 24 hours. **          Results for orders placed during the hospital encounter of 02/07/15   CONVERTED (HISTORICAL) ECHO    Narrative Patient:      SABRINA CABA    [facility] Rec#:     [id number]               :          1926            Date:         2015            Age:          88y                   Height:       162.56 cm / 64.0 in  Weight:       58.51 kg / 129.0 lbs  Sex:          F                     BSA:          1.62  Room#:        [facility]                    Sonographer:  Laurie Jolly RDCS, RDMS  Referring:    BELJOAQUINARISERICA  Reading:      Cory Culver MD  Primary:      Chuckie Burt  Unit:         3 Main  ______________________________________________________________________    Transthoracic Echocardiogram    Indication:  ASYMPTOMATIC BRADYCARDIA  BP:           187/71    Conclusions  1. The study quality is technically difficult; all measurements are  approximate.   2. Global left ventricular wall motion and contractility are within  normal limits.  3. The left ventricle appears hyperdynamic.  4. There is an E to A reversal in the mitral valve flow pattern  suggestive of diastolic dysfunction.  5. The right ventricle is mildly dilated.   6. There is mild aortic regurgitation.   7. There is no evidence of mitral valve prolapse.  8. No pulmonary hypertension is noted.  9. There is no evidence of pericardial effusion.  10. There is no dilatation of the aortic root.  11. The venous system appears normal.    Findings       Technical Comments:  The study quality is technically difficult.  The study is technically  limited due to poor acoustic windows.      Left Ventricle:  The left ventricular chamber size is normal. Global left ventricular  wall motion and contractility are within normal limits. The left  ventricle appears hyperdynamic. The estimated ejection fraction is  greater than 65%.  There is an E to A reversal in the  mitral valve flow  pattern suggestive of diastolic dysfunction.     Left Atrium:  The left atrial chamber size is normal.     Right Ventricle:  The right ventricle is mildly dilated.  The right ventricular global  systolic function is normal.   Right Atrium:  The right atrial cavity size is normal. No atrial septal defect is  visualized.     Aortic Valve:  The aortic valve is trileaflet. The aortic valve leaflets are mildly  thickened. Mild aortic cusp sclerosis is present. There is aortic  annular calcification. There is mild aortic regurgitation.  There is no  evidence of aortic stenosis.     Mitral Valve:  The mitral valve leaflets appear normal. There is no evidence of mitral  valve prolapse. There is no evidence of mitral regurgitation. There is  no evidence of mitral stenosis.     Tricuspid Valve:  The tricuspid valve leaflets are normal.  There is a trace tricuspid  regurgitation.  No pulmonary hypertension is noted.     Pulmonic Valve:  The pulmonic valve appears normal. There is a trace pulmonic  regurgitation.      Pericardium:  There is no evidence of pericardial effusion.     Aorta:  There is no dilatation of the aortic root.     Pulmonary Artery:  The main pulmonary artery appears normal.     Venous:  The venous system appears normal.     Measurements   Chambers  Name                    Value           RVIDd (AP) 2D           2.9 cm          IVSd (2D)               1 cm            LVIDd (2D)              4 cm            LVIDs (2D)              2.2 cm          LVPWd (2D)              1 cm            EF (2D)                 80 %            Ao root diameter (2D)   2.7 cm          LA dimension (AP) 2D    3 cm            LA:Ao ratio (2D)        1.1 ratio         Diastolic/Systolic Function  Name                    Value           MV E-wave Vmax          0.43 m/sec      MV A-wave Vmax          0.81 m/sec      MV E:A ratio            0.5 ratio       LV septal e' Vmax       0.08 m/sec      LV lateral e' Vmax       0.05 m/sec      LV E:e' septal ratio    5.2 ratio       LV E:e' lateral ratio   8.3 ratio         Aortic Valve  Name                    Value           AV Vmax                 1.25 m/sec      AV peak gradient        6 mmHg          AR PHT                  768 msec        AR peak gradient        73 mmHg           Pulmonic Valve/Qp:Qs  Name                    Value           PV Vmax                 0.85 m/sec      PV peak gradient        3 mmHg          PV acceleration time    35 msec           Electronically signed by: Cory Culver MD on 02/09/2015 17:21:27       I have reviewed the medications:    Current Facility-Administered Medications:   •  acetaminophen (TYLENOL) tablet 650 mg, 650 mg, Oral, BID, Elaina Frost MD, 650 mg at 04/10/19 0903  •  artificial tears (LUBRIFRESH P.M.) ophthalmic ointment, , Topical, Q1H PRN, Elaina Frost MD  •  carvedilol (COREG) tablet 12.5 mg, 12.5 mg, Oral, Q12H, Rosanne Rankin MD, 12.5 mg at 04/10/19 0903  •  cefepime (MAXIPIME) 1 g/100 mL 0.9% NS IVPB (mbp), 1 g, Intravenous, Q12H, Vishal Garay MD, 1 g at 04/10/19 0013  •  famotidine (PEPCID) tablet 20 mg, 20 mg, Oral, Daily, Elaina Frost MD, 20 mg at 04/10/19 0903  •  heparin (porcine) 5000 UNIT/ML injection 5,000 Units, 5,000 Units, Subcutaneous, Q8H, Case, Porsha V., DO, 5,000 Units at 04/10/19 0635  •  labetalol (NORMODYNE,TRANDATE) injection 20 mg, 20 mg, Intravenous, Q10 Min PRN, Rosanne Rankin MD, 20 mg at 04/07/19 1104  •  magnesium sulfate 4 gram infusion - Mg less than or equal to 1mg/dL, 4 g, Intravenous, PRN **OR** magnesium sulfate 3 gram infusion (1gm x 3) - Mg 1.1 - 1.5 mg/dL, 1 g, Intravenous, PRN **OR** Magnesium Sulfate 2 gram infusion- Mg 1.6 - 1.9 mg/dL, 2 g, Intravenous, PRN, Elaina Frost MD  •  NIFEdipine (PROCARDIA) capsule 30 mg, 30 mg, Oral, Q8H, Rosanne Rankin MD  •  nystatin (MYCOSTATIN) powder 1 application, 1 application, Topical, BID,  Elaina Frost MD, 1 application at 04/10/19 0904  •  potassium chloride (MICRO-K) CR capsule 40 mEq, 40 mEq, Oral, PRN **OR** potassium chloride (KLOR-CON) packet 40 mEq, 40 mEq, Oral, PRN, 40 mEq at 04/08/19 0104 **OR** potassium chloride 10 mEq in 100 mL IVPB, 10 mEq, Intravenous, Q1H PRN, Elaina Frost MD  •  potassium chloride 10 mEq in 100 mL IVPB, 10 mEq, Intravenous, Q1H PRN, Armand Dumont, APRN, Last Rate: 100 mL/hr at 04/07/19 1153, 10 mEq at 04/07/19 1153  •  sodium chloride 0.9 % flush 10 mL, 10 mL, Intravenous, PRN, Tee Simons MD  •  sodium chloride 0.9 % flush 3 mL, 3 mL, Intravenous, Q12H, Case, Porsha V., DO, 3 mL at 04/10/19 0904  •  sodium chloride 0.9 % flush 3-10 mL, 3-10 mL, Intravenous, PRN, Case, Porsha V., DO  •  sodium chloride 0.9 % infusion, 50 mL/hr, Intravenous, Continuous, Elaina Frost MD, Last Rate: 50 mL/hr at 04/07/19 1422, 50 mL/hr at 04/07/19 1422  •  venlafaxine (EFFEXOR) tablet 37.5 mg, 37.5 mg, Oral, BID With Meals, Elaina Frost MD, 37.5 mg at 04/10/19 0859      Assessment/Plan   Assessment / Plan     Active Hospital Problems    Diagnosis POA   • **Sepsis due to urinary tract infection (CMS/HCC) [A41.9, N39.0] Yes   • Hypertension [I10] Yes   • Diabetes mellitus (CMS/HCC) [E11.9] Yes   • Depression [F32.9] Yes   • Dementia [F03.90] Yes   • TAMI (acute kidney injury) (CMS/HCC) [N17.9] Yes   • Lactic acidosis [E87.2] Yes   • Transaminitis [R74.0] Yes          Brief Hospital Course to date:  Roro Guzman is a 89-year-old woman hospitalized with sepsis secondary to recurrent urinary tract infection with associated lactic acidosis and renal insufficiency. Patient was initially admitted to the ICU for her care, and quickly improved with IV hydration as well as IV abx. Patient transferred to the floor 4/7 for further care.     HTN, uncontrolled  -- noted issues with uncontrolled BP during admission, home anti-HTN initially held but  resumed on HD 2 (amlodipine), did require cardene gtt in ICU   -- BP continues to be elevated; uptitrated nifedipine to 30 TID today, seems to be maxed out on Bblocker as HR around 60  -- limited options as patient with allergy to ACE/ARB- patient does not know her allergy when asked    Sepsis secondary to UTI  Lactic acidosis, resolved  -- urine culture growing 2 organisms- >100k Ecoli and >100k Proteus   -- currently on cefepime, ID following, d/w Dr Quesada this morning about switching patient to oral cefuroxime at time of discharge , likely tomorrow if BP improved     Hypokalemia  -- replace per protocol  -- Mg normal     TAMI, resolved  -- improved with IVF    Transaminitis, resolving   -- likely related to sepsis, improving      DVT Prophylaxis:  heparin    Disposition: I expect the patient to be discharged to NH facility at [facility]- may be ready to go as early as tomorrow- pending improvement in BP     CODE STATUS:   Code Status and Medical Interventions:   Ordered at: 04/05/19 1104     Limited Support to NOT Include:    Intubation    Cardioversion/Defibrillation     Level Of Support Discussed With:    Health Care Surrogate     Code Status:    No CPR     Medical Interventions (Level of Support Prior to Arrest):    Limited         Electronically signed by Rosanne Rankin MD, 04/10/19, 12:07 PM.

## 2019-04-10 NOTE — PROGRESS NOTES
"                  Clinical Nutrition     Nutrition Assessment  Reason for Visit:   Difficulty chewing/swallowing, MST score 2+, Unintentional weight loss, Reduced oral intake      Patient Name: Roro Guzman  YOB: 1926  MRN: [id number]  Date of Encounter: 04/10/19 7:54 AM  Admission date: 4/5/2019      Nutrition Assessment   Assessment       Admission diagnosis     Sepsis due to urinary tract infection (CMS/HCC)    Additional applicable diagnosis/conditions/procedures this adm   AMS  TAMI-resolved  Lactic acidosis-resolved  Suspected pharyngeal dysfunction    (4/6) S/p SLP evaluation at bedside: SLP Diet Recommendation: puree, nectar thick liquids      Applicable PMH:  Hypertension  Diabetes mellitus (CMS/HCC)  Depression  Dementia  PVD  CDiff  GERD    Pt is a resident at [facility]    Reported/Observed/Food/Nutrition Related History:      RN notes pt is pleasantly confused today. SLP continuing to follow. RD spoke with pts care tech today whom is assisting with her meals and she stated pt took 5-6 bites of grits and a few drinks of Boost ONS. She states pt doing well with adaptive cup she has been provided with.     Anthropometrics     Height: 165.1 cm (65\")  Last filed wt: Weight: 59.4 kg (131 lb) (04/08/19 0700)  Weight Method: Bed scale    BMI: BMI (Calculated): 21.80kg/m2   Normal: 18.5-24.9kg/m2    Ideal Body Weight (IBW) (kg): 57.29    Weight Change   UBW: 128lb   123 lb on March 12 per residence records    Last 15 Recorded Weights   Weight Weight (kg) Weight (lbs) Weight Method   4/8/2019 59.421 kg 131 lb Bed scale   4/7/2019 58.469 kg 128 lb 14.4 oz Bed scale   4/5/2019 50 kg 110 lb 3.7 oz Bed scale   4/5/2019 54.432 kg 120 lb Estimated   2/27/2019 54.432 kg 120 lb Estimated       Weight change: will await to calculate as fluid status likley affecting adm wt     Labs reviewed     Results from last 7 days   Lab Units 04/10/19  0630 04/08/19  0331 04/07/19  1707 04/07/19  0343  04/06/19  0352 "   GLUCOSE mg/dL 98 90  --  110*  --  143*   BUN mg/dL 17 24*  --  29*  --  30*   CREATININE mg/dL 0.51* 0.65  --  0.75  --  0.91   SODIUM mmol/L 144 143  --  142  --  146   CHLORIDE mmol/L 110* 115*  --  116*  --  115*   POTASSIUM mmol/L 3.0* 3.8 3.4* 3.4*   < > 2.7*   MAGNESIUM mg/dL  --   --   --  2.4  --  1.9   ALT (SGPT) U/L 37* 58*  --  70*  --   --     < > = values in this interval not displayed.     Results from last 7 days   Lab Units 04/10/19  0630 04/08/19  0331 04/07/19  0343   ALBUMIN g/dL 2.90* 3.32 3.00*             No results found for: HGBA1C      Medications reviewed   Pertinent:  GTT:NaCl@50mL/hr    Current Nutrition Prescription     PO: Diet Dysphagia; II - Pureed; Thin; Other; small bite/sips  Total feed    Nutrition Supplements: Boost Plus (3 times daily)    Intake: 17% x 7 meals            Nutrition Diagnosis       4/10  Problem Inadequate oral Intake    Etiology Appetite/mentak status    Signs/Symptoms 17% x 7 meals      4/6  Problem Predicted suboptimal energy intake   Etiology PO intake trend PTA   Signs/Symptoms Poor per report        Nutrition Intervention   1.  Follow treatment progress, Care plan reviewed  2. Continue to encourage intake of nutrient dense ONS    Goal:   General: Nutrition support treatment  PO: Increase intake      Monitoring/Evaluation:   Per protocol, PO intake, Supplement intake      Will Continue to follow per protocol      Marsha Downs RDN, LD  Time Spent: 30min

## 2019-04-10 NOTE — PROGRESS NOTES
Continued Stay Note  [facility]     Patient Name: Roro Guzman  MRN: [id number]  Today's Date: 4/10/2019    Admit Date: 4/5/2019    Discharge Plan     Row Name 04/10/19 1046       Plan    Plan  [facility], long term care    Patient/Family in Agreement with Plan  yes    Plan Comments  I update Isa Grace, [facility] liasion of possibility of transfer back after hypertension is managed. Not anticipating transfer today. She will let me know level of care.     15:15: I have updated patient's son, MARIO Guzman on transfer back to [facility] when blood pressure manageable. He is agreeable with this plan.     Final Discharge Disposition Code  03 - skilled nursing facility (SNF)        Discharge Codes    No documentation.       Expected Discharge Date and Time     Expected Discharge Date Expected Discharge Time    Apr 11, 2019             Rosanne Pederson RN

## 2019-04-10 NOTE — PLAN OF CARE
Problem: Skin Injury Risk (Adult)  Goal: Skin Health and Integrity  Outcome: Ongoing (interventions implemented as appropriate)      Problem: Fall Risk (Adult)  Goal: Absence of Fall  Outcome: Ongoing (interventions implemented as appropriate)      Problem: Patient Care Overview  Goal: Plan of Care Review   04/06/19 0634 04/09/19 1218 04/10/19 1842   Coping/Psychosocial   Plan of Care Reviewed With --  --  patient;son   Plan of Care Review   Progress --  improving --    OTHER   Outcome Summary --  --  --    Coping/Psychosocial   Patient Agreement with Plan of Care unable to participate --  --     04/10/19 1927   Coping/Psychosocial   Plan of Care Reviewed With --    Plan of Care Review   Progress --    OTHER   Outcome Summary pleasantly confused at times, son at bedside olaf cont to monitor   Coping/Psychosocial   Patient Agreement with Plan of Care --      Goal: Discharge Needs Assessment  Outcome: Ongoing (interventions implemented as appropriate)      Problem: Sepsis/Septic Shock (Adult)  Goal: Signs and Symptoms of Listed Potential Problems Will be Absent, Minimized or Managed (Sepsis/Septic Shock)  Outcome: Ongoing (interventions implemented as appropriate)

## 2019-04-10 NOTE — PLAN OF CARE
Problem: Skin Injury Risk (Adult)  Goal: Skin Health and Integrity  Outcome: Ongoing (interventions implemented as appropriate)      Problem: Fall Risk (Adult)  Goal: Absence of Fall  Outcome: Ongoing (interventions implemented as appropriate)      Problem: Patient Care Overview  Goal: Interprofessional Rounds/Family Conf  Outcome: Ongoing (interventions implemented as appropriate)      Problem: Sepsis/Septic Shock (Adult)  Goal: Signs and Symptoms of Listed Potential Problems Will be Absent, Minimized or Managed (Sepsis/Septic Shock)  Outcome: Ongoing (interventions implemented as appropriate)      Problem: Patient Care Overview  Goal: Plan of Care Review  Outcome: Ongoing (interventions implemented as appropriate)   04/09/19 1218 04/09/19 1557 04/09/19 2000   Coping/Psychosocial   Plan of Care Reviewed With --  --  patient   Plan of Care Review   Progress improving --  --    OTHER   Outcome Summary --  pt pleasantly confused, cooperative PT/OT worked with pt today son here this afternoon will cont to monitor --      Goal: Individualization and Mutuality  Outcome: Ongoing (interventions implemented as appropriate)    Goal: Discharge Needs Assessment  Outcome: Ongoing (interventions implemented as appropriate)    Goal: Interprofessional Rounds/Family Conf  Outcome: Ongoing (interventions implemented as appropriate)

## 2019-04-10 NOTE — PROGRESS NOTES
[facility] Progress Note        Antibiotics:  Anti-Infectives (From admission, onward)    Ordered     Dose/Rate Route Frequency Start Stop    04/06/19 0817  cefepime (MAXIPIME) 1 g/100 mL 0.9% NS IVPB (mbp)     Ordering Provider:  Vishal Garay MD    1 g  over 4 Hours Intravenous Every 12 Hours 04/06/19 1200 04/15/19 1159    04/05/19 1248  cefepime (MAXIPIME) 2 g/100 mL 0.9% NS (mbp)     Ordering Provider:  Porsha Major., DO    2 g  200 mL/hr over 30 Minutes Intravenous Once 04/05/19 1430 04/05/19 1641    04/05/19 1128  vancomycin (VANCOCIN) in iso-osmotic dextrose IVPB 1 g (premix) 200 mL     Ordering Provider:  Silvano Britt, RPH    1,000 mg  over 60 Minutes Intravenous Once 04/05/19 1130 04/05/19 1245    04/05/19 0858  cefTRIAXone (ROCEPHIN) IVPB 2 g     Ordering Provider:  Tee Simons MD    2 g  100 mL/hr over 30 Minutes Intravenous Once 04/05/19 0900 04/05/19 1127          CC:    HPI:    Patient is a 89 y.o.  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  Evaluated in the emergency room April 5, 2019 with acute deterioration in mental status, fever exceeding 102, noted to have acute kidney injury and tachycardia, and diagnosed with acute sepsis, UTI and given broad-spectrum antimicrobials.  Urine culture subsequently with gram-negative rods and empiric cefepime ongoing.  Kidney function improved with resuscitation and fever/leukocytosis improved with empiric antibiotics.  Urinalysis with pyuria/hematuria.     4/10/19 seen early and sleepy; Patient not cooperative with review of systems or history.  Nursing reports no respiratory distress or cough.  No nausea/vomiting or diarrhea.  Urinary incontinence but no gross hematuria/pyuria per their report.  Patient debilitated with dementia and no other specific focal pain that they will relate.  No adverse drug reaction per nursing.  No rash         ROS:   "    4/10/19 No f/c/s. No n/v/d. No rash. No new ADR to Abx.     PE:   /71   Pulse 59   Temp 98.6 °F (37 °C) (Oral)   Resp 18   Ht 165.1 cm (65\")   Wt 59.4 kg (131 lb)   SpO2 94%   BMI 21.80 kg/m²     GENERAL: sleepy and not interacting or answering specific questions at present, in no acute distress.   HEENT: Normocephalic, atraumatic. No conjunctival injection. No icterus. Oropharynx clear without evidence of thrush or exudate. No evidence of peridontal disease.    NECK: Supple without nuchal rigidity. No mass.  LYMPH: No cervical, axillary or inguinal lymphadenopathy.  HEART: RRR; No murmur, rubs, gallops.   LUNGS: Diminished at bases bilateral bilaterally without wheezing, rales, rhonchi. Normal respiratory effort. Nonlabored. No dullness.  ABDOMEN: Soft, slight grimace with suprapubic pressure, nondistended. Positive bowel sounds. No rebound or guarding. NO mass or HSM.  EXT:  No cyanosis, clubbing or edema. No cord.  : Genitalia generally unremarkable.  Without Davis catheter.  MSK: FROM without joint effusions noted arms/legs.    SKIN: Warm and dry without cutaneous eruptions on Inspection/palpation.    NEURO: She does not interact in any meaningful way with motor/sensory exam     No peripheral stigmata/phenomena of endocarditis          Laboratory Data    Results from last 7 days   Lab Units 04/08/19  0331 04/07/19  0343 04/06/19  0352   WBC 10*3/mm3 7.85 12.17* 13.77*   HEMOGLOBIN g/dL 12.3 11.8 13.0   HEMATOCRIT % 36.9 36.9 42.1   PLATELETS 10*3/mm3 214 218 284     Results from last 7 days   Lab Units 04/10/19  0630   SODIUM mmol/L 144   POTASSIUM mmol/L 3.0*   CHLORIDE mmol/L 110*   CO2 mmol/L 22.0   BUN mg/dL 17   CREATININE mg/dL 0.51*   GLUCOSE mg/dL 98   CALCIUM mg/dL 7.9*     Results from last 7 days   Lab Units 04/10/19  0630   ALK PHOS U/L 79   BILIRUBIN mg/dL 0.3   ALT (SGPT) U/L 37*   AST (SGOT) U/L 34*               Estimated Creatinine Clearance: 42.1 mL/min (A) (by C-G formula " based on SCr of 0.51 mg/dL (L)).      Microbiology:      Radiology:  Imaging Results (last 72 hours)     Procedure Component Value Units Date/Time    FL Video Swallow With Speech [[id number]] Collected:  04/08/19 1441     Updated:  04/08/19 1657    Narrative:       EXAMINATION: FL VIDEO SWALLOW W SPEECH-     INDICATION: DYSPHAGIA, OROPHARYNGEAL, HAS ATTRIBUTABLE CAUSE     TECHNIQUE: 1 minute and 12 seconds of fluoroscopic time was used for  this exam. 1 associated image was saved. The patient was evaluated in  the seated lateral position while taking a variety of consistencies of  barium by mouth under the direction of speech pathology.     COMPARISON: NONE     FINDINGS: There was penetration that cleared without aspiration with  sips of thin barium. There was no penetration and no aspiration with  nectar, or pudding consistency barium.          Impression:       Fluoroscopy provided for a modified barium swallow. Please  see speech therapy report for full details and recommendations.         This report was finalized on 4/8/2019 4:53 PM by Dr. Derek Contreras MD.       US Renal Bilateral [[id number]] Collected:  04/08/19 1601     Updated:  04/08/19 1609    Narrative:       EXAMINATION: US RENAL, BILATERAL-04/08/2019:     INDICATION: TAMI; UTI and sepsis;  r/o obstruction; A41.9-Sepsis,  unspecified organism; N39.0-Urinary tract infection, site not specified;  N28.9-Disorder of kidney and ureter, unspecified; E86.0-Dehydration;  E87.3-Alkalosis; R74.0-Nonspecific elevation of levels of transaminase  and lactic acid dehydrogenase (ldh); R41.82-Altered mental status,  unspecified; Z74.09-Other reduced mobility; Z74.09-Other reduced.       TECHNIQUE: Ultrasound of the kidneys and urinary bladder.     COMPARISON: NONE.     FINDINGS:  The right kidney measures 9.3 cm in length without evidence  of hydronephrosis, contour deforming mass or obvious calculi.     The left kidney measures 8.5 cm in length without evidence  of  hydronephrosis, contour deforming mass or obvious calculi.     The urinary bladder is moderately distended with layering echogenic  material without significant internal flow on Doppler interrogation  likely representing debris.     Incidental note of echogenic foci with shadowing in the gallbladder  indicating cholelithiasis.       Impression:       1.  No hydronephrosis.  2.  Layering echogenic material of likely debris within the urinary  bladder. This does not have internal flow on Doppler interrogation.  3.  Incidental note of cholelithiasis.     D:  04/08/2019  E:  04/08/2019            This report was finalized on 4/8/2019 4:06 PM by Dr. Leo Lombardi.       XR Chest 1 View [[id number]] Collected:  04/06/19 1351     Updated:  04/06/19 2258    Narrative:          EXAMINATION: XR CHEST 1 VW - 04/06/2019     INDICATION:  A41.9-Sepsis, unspecified organism; N39.0-Urinary tract  infection, site not specified; N28.9-Disorder of kidney and ureter,  unspecified; E86.0-Dehydration; E87.3-Alkalosis; R74.0-Nonspecific  elevation of levels of transaminase and lactic acid dehydrogenase (ldh);  R41.82-Altered mental status, unspecified.      COMPARISON: 04/05/2019     FINDINGS: Patient again appears rotated to the right, apparently due to  marked levoconvex scoliosis. Heart shadow is mildly enlarged. The  vasculature appears normal. Lungs are moderately well-expanded and  appear clear. Old left-sided rib fractures and advanced shoulder joint  DJD are again noted.           Impression:       Stable chest exam with cardiomegaly but no evidence of  congestive failure. No new chest disease is identified.     DICTATED:   04/06/2019  EDITED/ls :   04/06/2019      This report was finalized on 4/6/2019 10:55 PM by DR. Earl Valadez MD.               Impression:     --Acute sepsis, urinary source and improving sepsis parameters with resuscitation/antibiotics.  No other particular focus by exam/symptoms at present but will  monitor     --Acute complicated UTI.  E. Coli/proteus in culture.  High risk for further relapse in the future.  Family will need to help determine goals of care.  With recurrent UTIs, consideration could be given to urology input as outpatient for further functional/anatomic assessment to help minimize risk for relapse.  However her age and comorbidity may limit utility and family may wish to focus more on conservative care/palliation rather than further workup.  However ultimately that decision will be up to them.     --Acute kidney injury.  Likely prerenal associated with sepsis and ATN.  However, check renal ultrasound to exclude any obvious obstruction      --Chronic dementia with acute encephalopathy out of proportion to baseline, likely associated with sepsis at least.  Unclear to me if hypertension or other issues related.  Further workup or decision regarding subspecialty evaluation per medicine at their discretion     --Hypertensive urgency per records.  Treatment per medicine     --Abnormal LFTs.  Monitor.  Abdomen otherwise benign     --History VRE       PLAN:      --IV cefepime for now     --Check/review labs cultures and scans     --Discussed with microbiology     --Partial history per nursing staff     --Renal ultrasound pending     --Highly complex set of issues with high risk for further serious morbidity and other serious sequela    --Oral Omnicef likely to help transition to oral agent and finish therapy for UTI, 7-10 days total from initiation of cefepime. D/w Dr Rankin;  I will sign off at discharge; we are available prn; call us if needed       Mario Brice MD  4/10/2019

## 2019-04-11 VITALS
HEART RATE: 65 BPM | WEIGHT: 131 LBS | RESPIRATION RATE: 16 BRPM | OXYGEN SATURATION: 96 % | HEIGHT: 65 IN | BODY MASS INDEX: 21.83 KG/M2 | DIASTOLIC BLOOD PRESSURE: 70 MMHG | TEMPERATURE: 97.5 F | SYSTOLIC BLOOD PRESSURE: 163 MMHG

## 2019-04-11 PROBLEM — N17.9 AKI (ACUTE KIDNEY INJURY) (HCC): Status: RESOLVED | Noted: 2019-04-05 | Resolved: 2019-04-11

## 2019-04-11 PROBLEM — R74.01 TRANSAMINITIS: Status: RESOLVED | Noted: 2019-04-05 | Resolved: 2019-04-11

## 2019-04-11 PROBLEM — A41.9 SEPSIS DUE TO URINARY TRACT INFECTION (HCC): Status: RESOLVED | Noted: 2019-04-05 | Resolved: 2019-04-11

## 2019-04-11 PROBLEM — N39.0 SEPSIS DUE TO URINARY TRACT INFECTION (HCC): Status: RESOLVED | Noted: 2019-04-05 | Resolved: 2019-04-11

## 2019-04-11 PROBLEM — E87.20 LACTIC ACIDOSIS: Status: RESOLVED | Noted: 2019-04-05 | Resolved: 2019-04-11

## 2019-04-11 LAB
ANION GAP SERPL CALCULATED.3IONS-SCNC: 10 MMOL/L
BUN BLD-MCNC: 17 MG/DL (ref 8–23)
BUN/CREAT SERPL: 28.8 (ref 7–25)
CALCIUM SPEC-SCNC: 8.7 MG/DL (ref 8.2–9.6)
CHLORIDE SERPL-SCNC: 110 MMOL/L (ref 98–107)
CO2 SERPL-SCNC: 20 MMOL/L (ref 22–29)
CREAT BLD-MCNC: 0.59 MG/DL (ref 0.57–1)
GFR SERPL CREATININE-BSD FRML MDRD: 95 ML/MIN/1.73
GLUCOSE BLD-MCNC: 106 MG/DL (ref 65–99)
POTASSIUM BLD-SCNC: 3.5 MMOL/L (ref 3.5–5.2)
SODIUM BLD-SCNC: 140 MMOL/L (ref 136–145)

## 2019-04-11 PROCEDURE — 99239 HOSP IP/OBS DSCHRG MGMT >30: CPT | Performed by: INTERNAL MEDICINE

## 2019-04-11 PROCEDURE — 25010000002 HEPARIN (PORCINE) PER 1000 UNITS: Performed by: INTERNAL MEDICINE

## 2019-04-11 PROCEDURE — 80048 BASIC METABOLIC PNL TOTAL CA: CPT | Performed by: INTERNAL MEDICINE

## 2019-04-11 RX ORDER — GABAPENTIN 100 MG/1
100 CAPSULE ORAL 2 TIMES DAILY
Qty: 8 CAPSULE | Refills: 0 | Status: SHIPPED | OUTPATIENT
Start: 2019-04-11

## 2019-04-11 RX ORDER — CARVEDILOL 12.5 MG/1
12.5 TABLET ORAL EVERY 12 HOURS SCHEDULED
Start: 2019-04-11

## 2019-04-11 RX ORDER — NIFEDIPINE 60 MG/1
60 TABLET, FILM COATED, EXTENDED RELEASE ORAL DAILY
Start: 2019-04-11

## 2019-04-11 RX ORDER — CEFDINIR 300 MG/1
300 CAPSULE ORAL 2 TIMES DAILY
Qty: 10 CAPSULE | Refills: 0
Start: 2019-04-11

## 2019-04-11 RX ADMIN — ACETAMINOPHEN 650 MG: 325 TABLET, FILM COATED ORAL at 08:24

## 2019-04-11 RX ADMIN — HEPARIN SODIUM 5000 UNITS: 5000 INJECTION INTRAVENOUS; SUBCUTANEOUS at 13:37

## 2019-04-11 RX ADMIN — NYSTATIN 1 APPLICATION: 100000 POWDER TOPICAL at 08:23

## 2019-04-11 RX ADMIN — VENLAFAXINE 37.5 MG: 37.5 TABLET ORAL at 08:24

## 2019-04-11 RX ADMIN — NIFEDIPINE 30 MG: 10 CAPSULE ORAL at 05:14

## 2019-04-11 RX ADMIN — HEPARIN SODIUM 5000 UNITS: 5000 INJECTION INTRAVENOUS; SUBCUTANEOUS at 05:14

## 2019-04-11 RX ADMIN — FAMOTIDINE 20 MG: 20 TABLET ORAL at 08:24

## 2019-04-11 RX ADMIN — NIFEDIPINE 30 MG: 10 CAPSULE ORAL at 13:37

## 2019-04-11 RX ADMIN — CARVEDILOL 12.5 MG: 12.5 TABLET, FILM COATED ORAL at 08:24

## 2019-04-11 RX ADMIN — CEFEPIME HYDROCHLORIDE 1 G: 1 INJECTION, POWDER, FOR SOLUTION INTRAMUSCULAR; INTRAVENOUS at 13:38

## 2019-04-11 RX ADMIN — SODIUM CHLORIDE, PRESERVATIVE FREE 3 ML: 5 INJECTION INTRAVENOUS at 08:25

## 2019-04-11 NOTE — PROGRESS NOTES
Case Management Discharge Note    Final Note: I have notified Rui TY of transfer back to [facility], via [facility] for 15:30 today. Tele [phone].     Destination - Selection Complete      Service Provider Request Status Selected Services Address Phone Number Fax Number    [facility] [id number], [facility] [id number] [phone]       Rosanne Pederson RN 4/11/2019 1415    Going back to [facility]. Facility will do prior authorization with her insurance                 Durable Medical Equipment      No service has been selected for the patient.      Dialysis/Infusion      No service has been selected for the patient.      Home Medical Care      No service has been selected for the patient.      Therapy      No service has been selected for the patient.      Community Resources      No service has been selected for the patient.        Transportation Services  Ambulance: [facility]    Final Discharge Disposition Code: 03 - skilled nursing facility (SNF)

## 2019-04-11 NOTE — PLAN OF CARE
Problem: Patient Care Overview  Goal: Plan of Care Review  Outcome: Ongoing (interventions implemented as appropriate)   04/11/19 1414   Coping/Psychosocial   Plan of Care Reviewed With patient   Plan of Care Review   Progress no change   OTHER   Outcome Summary Pleasantly confused, AOx1, tolerating pureed diet, bed bound. Patient to be discharged.     Goal: Individualization and Mutuality  Outcome: Ongoing (interventions implemented as appropriate)    Goal: Discharge Needs Assessment  Outcome: Ongoing (interventions implemented as appropriate)    Goal: Interprofessional Rounds/Family Conf  Outcome: Ongoing (interventions implemented as appropriate)

## 2019-04-11 NOTE — DISCHARGE SUMMARY
[facility] Medicine Services  DISCHARGE SUMMARY    Patient Name: Roro Guzman  : 1926  MRN: [id number]    Date of Admission: 2019  Date of Discharge: 19  Primary Care Physician: Maria Puentes APRN    Consults     Date and Time Order Name Status Description    2019 0030 Inpatient Infectious Diseases Consult Completed           Hospital Course     Presenting Problem:   Sepsis due to urinary tract infection (CMS/HCC) [A41.9, N39.0]    Active Hospital Problems    Diagnosis  POA   • Hypertension [I10]  Yes   • Diabetes mellitus (CMS/HCC) [E11.9]  Yes   • Depression [F32.9]  Yes   • Dementia [F03.90]  Yes      Resolved Hospital Problems    Diagnosis Date Resolved POA   • **Sepsis due to urinary tract infection (CMS/HCC) [A41.9, N39.0] 2019 Yes   • TAMI (acute kidney injury) (CMS/[facility]ly [facility]) [N17.9] 2019 Yes   • Lactic acidosis [E87.2] 2019 Yes   • Transaminitis [R74.0] 2019 Yes          Hospital Course:  Roro Guzman is a 89-year-old woman hospitalized with sepsis secondary to recurrent urinary tract infection with associated lactic acidosis and renal insufficiency. Patient was initially admitted to the ICU for her care, and quickly improved with IV hydration as well as IV abx. Patient transferred to the floor  for further care.      HTN, uncontrolled  -- norvasc changed to nifedipine for better bp control -- will change TID dosing to 60 mg XL at discharge, can titrate up dose as needed at [facility]  -- HCTZ held due to some renal insufficiency at admission  -- coreg added and titrated up as heart rate would allow  -- limited options as patient with allergy to ACE/ARB- patient does not know her allergy when asked     Sepsis secondary to UTI  -- urine culture growing 2 organisms- >100k Ecoli and >100k Proteus   -- has completed 5 days of appropriate IV antibiotic therapy while hospitalized, will convert to PO Omnicef at discharge to complete and additional  5 days at the SNF (for 10 days total antibiotic therapy)    Lactic acidosis, resolved     Hypokalemia  -- replace per protocol  -- since HCTZ held at discharge, will not resume daily potassium supplements at SNF.  However, suggest follow up CMP in one week to determine need for any ongoing daily supplementation.  -- Mg normal      TAMI, resolved  -- improved with IVF     Transaminitis, resolving   -- likely related to sepsis, improving            Discharge Follow Up Recommendations for labs/diagnostics:   Recommend checking CMP as skilled nursing facility in one week -  Consider resuming daily potassium supplements if hypokalemic.    Day of Discharge     HPI:   Feels ok, no complaints, no nausea    Review of Systems  Gen- No fevers, chills  CV- No chest pain, palpitations  Resp- No cough, dyspnea  GI- No N/V/D, abd pain      Otherwise ROS is negative except as mentioned in the HPI.    Vital Signs:   Temp:  [98.1 °F (36.7 °C)-98.4 °F (36.9 °C)] 98.4 °F (36.9 °C)  Heart Rate:  [60-67] 67  Resp:  [16] 16  BP: (148-186)/() 148/69     Physical Exam:  Constitutional -no acute distress, non toxic, in bed, somewhat frail  HEENT-NCAT, mucous membranes moist  CV-RRR, S1 S2 normal, no m/r/g  Resp-CTAB, no wheezes, rhonchi or rales  Abd-soft, non-tender, non-distended, normo active bowel sounds  Ext-No lower extremity cyanosis, clubbing or edema bilaterally  Neuro-alert but mild confusion, knows name but not place, speech clear, moves all extremities   Psych-normal affect   Skin- No rash on exposed UE or LE bilaterally      Pertinent  and/or Most Recent Results     Results from last 7 days   Lab Units 04/11/19  0539 04/10/19  0630 04/08/19  0331 04/07/19  1707 04/07/19  0343 04/06/19  1539 04/06/19  0352 04/05/19  0858   WBC 10*3/mm3  --   --  7.85  --  12.17*  --  13.77* 12.20*   HEMOGLOBIN g/dL  --   --  12.3  --  11.8  --  13.0 16.6*   HEMATOCRIT %  --   --  36.9  --  36.9  --  42.1 51.8*   PLATELETS 10*3/mm3  --   --   214  --  218  --  284 361   SODIUM mmol/L 140 144 143  --  142  --  146 147*   POTASSIUM mmol/L 3.5 3.0* 3.8 3.4* 3.4* 3.8 2.7* 3.9   CHLORIDE mmol/L 110* 110* 115*  --  116*  --  115* 111*   CO2 mmol/L 20.0* 22.0 20.0  --  21.0  --  21.0 24.0   BUN mg/dL 17 17 24*  --  29*  --  30* 39*   CREATININE mg/dL 0.59 0.51* 0.65  --  0.75  --  0.91 1.35*   GLUCOSE mg/dL 106* 98 90  --  110*  --  143* 177*   CALCIUM mg/dL 8.7 7.9* 8.1*  --  8.0*  --  8.8 10.8*     Results from last 7 days   Lab Units 04/10/19  0630 04/08/19  0331 04/07/19  0343 04/05/19  0858   BILIRUBIN mg/dL 0.3 0.6 0.4 0.5   ALK PHOS U/L 79 83 76 134*   ALT (SGPT) U/L 37* 58* 70* 86*   AST (SGOT) U/L 34* 33 47* 66*           Invalid input(s): TG, LDLCALC, LDLREALC        Brief Urine Lab Results  (Last result in the past 365 days)      Color   Clarity   Blood   Leuk Est   Nitrite   Protein   CREAT   Urine HCG        04/05/19 0858 Dark Yellow Turbid Large (3+) Large (3+) Negative >=300 mg/dL (3+)               Microbiology Results Abnormal     Procedure Component Value - Date/Time    Blood Culture - Blood, Arm, Right [[id number]] Collected:  04/05/19 0945    Lab Status:  Final result Specimen:  Blood from Arm, Right Updated:  04/10/19 1030     Blood Culture No growth at 5 days    Blood Culture - Blood, Arm, Right [[id number]] Collected:  04/05/19 0950    Lab Status:  Final result Specimen:  Blood from Arm, Right Updated:  04/10/19 1030     Blood Culture No growth at 5 days    Urine Culture - Urine, Urine, Catheter [[id number]]  (Abnormal)  (Susceptibility) Collected:  04/05/19 0858    Lab Status:  Final result Specimen:  Urine, Catheter Updated:  04/08/19 1139     Urine Culture >100,000 CFU/mL Escherichia coli      >100,000 CFU/mL Proteus mirabilis    Susceptibility      Escherichia coli     DAR     Ampicillin Susceptible     Ampicillin + Sulbactam Susceptible     Aztreonam Susceptible     Cefepime Susceptible     Cefotaxime Susceptible     Ceftriaxone  Susceptible     Cefuroxime sodium Susceptible     Cephalothin Resistant     Ciprofloxacin Resistant     Ertapenem Susceptible     Gentamicin Susceptible     Levofloxacin Resistant     Meropenem Susceptible     Nitrofurantoin Susceptible     Piperacillin + Tazobactam Susceptible     Tetracycline Resistant     Tobramycin Susceptible     Trimethoprim + Sulfamethoxazole Susceptible                Susceptibility      Proteus mirabilis     DAR     Ampicillin Resistant     Ampicillin + Sulbactam Susceptible     Aztreonam Susceptible     Cefepime Susceptible     Cefotaxime Susceptible     Ceftriaxone Susceptible     Cefuroxime sodium Susceptible     Cephalothin Susceptible     Ciprofloxacin Resistant     Ertapenem Susceptible     Gentamicin Susceptible     Levofloxacin Intermediate     Meropenem Susceptible     Piperacillin + Tazobactam Susceptible     Tobramycin Susceptible     Trimethoprim + Sulfamethoxazole Resistant                          Imaging Results (all)     Procedure Component Value Units Date/Time    FL Video Swallow With Speech [[id number]] Collected:  04/08/19 1441     Updated:  04/08/19 1657    Narrative:       EXAMINATION: FL VIDEO SWALLOW W SPEECH-     INDICATION: DYSPHAGIA, OROPHARYNGEAL, HAS ATTRIBUTABLE CAUSE     TECHNIQUE: 1 minute and 12 seconds of fluoroscopic time was used for  this exam. 1 associated image was saved. The patient was evaluated in  the seated lateral position while taking a variety of consistencies of  barium by mouth under the direction of speech pathology.     COMPARISON: NONE     FINDINGS: There was penetration that cleared without aspiration with  sips of thin barium. There was no penetration and no aspiration with  nectar, or pudding consistency barium.          Impression:       Fluoroscopy provided for a modified barium swallow. Please  see speech therapy report for full details and recommendations.         This report was finalized on 4/8/2019 4:53 PM by Dr. Derek Contreras  MD.       US Renal Bilateral [[id number]] Collected:  04/08/19 1601     Updated:  04/08/19 1609    Narrative:       EXAMINATION: US RENAL, BILATERAL-04/08/2019:     INDICATION: TAMI; UTI and sepsis;  r/o obstruction; A41.9-Sepsis,  unspecified organism; N39.0-Urinary tract infection, site not specified;  N28.9-Disorder of kidney and ureter, unspecified; E86.0-Dehydration;  E87.3-Alkalosis; R74.0-Nonspecific elevation of levels of transaminase  and lactic acid dehydrogenase (ldh); R41.82-Altered mental status,  unspecified; Z74.09-Other reduced mobility; Z74.09-Other reduced.       TECHNIQUE: Ultrasound of the kidneys and urinary bladder.     COMPARISON: NONE.     FINDINGS:  The right kidney measures 9.3 cm in length without evidence  of hydronephrosis, contour deforming mass or obvious calculi.     The left kidney measures 8.5 cm in length without evidence of  hydronephrosis, contour deforming mass or obvious calculi.     The urinary bladder is moderately distended with layering echogenic  material without significant internal flow on Doppler interrogation  likely representing debris.     Incidental note of echogenic foci with shadowing in the gallbladder  indicating cholelithiasis.       Impression:       1.  No hydronephrosis.  2.  Layering echogenic material of likely debris within the urinary  bladder. This does not have internal flow on Doppler interrogation.  3.  Incidental note of cholelithiasis.     D:  04/08/2019  E:  04/08/2019            This report was finalized on 4/8/2019 4:06 PM by Dr. Leo Lombardi.       XR Chest 1 View [[id number]] Collected:  04/06/19 1351     Updated:  04/06/19 2256    Narrative:          EXAMINATION: XR CHEST 1 VW - 04/06/2019     INDICATION:  A41.9-Sepsis, unspecified organism; N39.0-Urinary tract  infection, site not specified; N28.9-Disorder of kidney and ureter,  unspecified; E86.0-Dehydration; E87.3-Alkalosis; R74.0-Nonspecific  elevation of levels of transaminase and lactic  acid dehydrogenase (ldh);  R41.82-Altered mental status, unspecified.      COMPARISON: 2019     FINDINGS: Patient again appears rotated to the right, apparently due to  marked levoconvex scoliosis. Heart shadow is mildly enlarged. The  vasculature appears normal. Lungs are moderately well-expanded and  appear clear. Old left-sided rib fractures and advanced shoulder joint  DJD are again noted.           Impression:       Stable chest exam with cardiomegaly but no evidence of  congestive failure. No new chest disease is identified.     DICTATED:   2019  EDITED/ls :   2019      This report was finalized on 2019 10:55 PM by DR. Earl Valadez MD.       XR Chest 1 View [[id number]] Collected:  19 1143     Updated:  19 1147    Narrative:       EXAMINATION: XR CHEST 1 VW-2019:      INDICATION: Pneumonia; A41.9-Sepsis, unspecified organism; N39.0-Urinary  tract infection, site not specified.      COMPARISON: 2019.     FINDINGS: The cardiac silhouette is normal. There is a tortuous thoracic  aorta. There is no acute inflammatory process, mass or effusion.           Impression:       Chronic change. There are no acute findings.     D:  2019  E:  2019     This report was finalized on 2019 11:44 AM by Dr. Derek Contreras MD.                       Results for orders placed during the hospital encounter of 02/07/15   CONVERTED (HISTORICAL) ECHO    Narrative Patient:      SABRINA CABA    Med Rec#:     [id number]               :          1926            Date:         2015            Age:          88y                   Height:       162.56 cm / 64.0 in  Weight:       58.51 kg / 129.0 lbs  Sex:          F                     BSA:          1.62  Room#:        3347-1                    Sonographer:  Laurie Jolly RDCS, RDMS  Referring:    BELCASTROMARISA  Reading:      Cory Culver MD  Primary:      Chuckie Burt  Unit:         3  Main  ______________________________________________________________________    Transthoracic Echocardiogram    Indication:  ASYMPTOMATIC BRADYCARDIA  BP:           187/71    Conclusions  1. The study quality is technically difficult; all measurements are  approximate.   2. Global left ventricular wall motion and contractility are within  normal limits.  3. The left ventricle appears hyperdynamic.  4. There is an E to A reversal in the mitral valve flow pattern  suggestive of diastolic dysfunction.  5. The right ventricle is mildly dilated.   6. There is mild aortic regurgitation.   7. There is no evidence of mitral valve prolapse.  8. No pulmonary hypertension is noted.  9. There is no evidence of pericardial effusion.  10. There is no dilatation of the aortic root.  11. The venous system appears normal.    Findings       Technical Comments:  The study quality is technically difficult.  The study is technically  limited due to poor acoustic windows.      Left Ventricle:  The left ventricular chamber size is normal. Global left ventricular  wall motion and contractility are within normal limits. The left  ventricle appears hyperdynamic. The estimated ejection fraction is  greater than 65%.  There is an E to A reversal in the mitral valve flow  pattern suggestive of diastolic dysfunction.     Left Atrium:  The left atrial chamber size is normal.     Right Ventricle:  The right ventricle is mildly dilated.  The right ventricular global  systolic function is normal.   Right Atrium:  The right atrial cavity size is normal. No atrial septal defect is  visualized.     Aortic Valve:  The aortic valve is trileaflet. The aortic valve leaflets are mildly  thickened. Mild aortic cusp sclerosis is present. There is aortic  annular calcification. There is mild aortic regurgitation.  There is no  evidence of aortic stenosis.     Mitral Valve:  The mitral valve leaflets appear normal. There is no evidence of mitral  valve  prolapse. There is no evidence of mitral regurgitation. There is  no evidence of mitral stenosis.     Tricuspid Valve:  The tricuspid valve leaflets are normal.  There is a trace tricuspid  regurgitation.  No pulmonary hypertension is noted.     Pulmonic Valve:  The pulmonic valve appears normal. There is a trace pulmonic  regurgitation.      Pericardium:  There is no evidence of pericardial effusion.     Aorta:  There is no dilatation of the aortic root.     Pulmonary Artery:  The main pulmonary artery appears normal.     Venous:  The venous system appears normal.     Measurements   Chambers  Name                    Value           RVIDd (AP) 2D           2.9 cm          IVSd (2D)               1 cm            LVIDd (2D)              4 cm            LVIDs (2D)              2.2 cm          LVPWd (2D)              1 cm            EF (2D)                 80 %            Ao root diameter (2D)   2.7 cm          LA dimension (AP) 2D    3 cm            LA:Ao ratio (2D)        1.1 ratio         Diastolic/Systolic Function  Name                    Value           MV E-wave Vmax          0.43 m/sec      MV A-wave Vmax          0.81 m/sec      MV E:A ratio            0.5 ratio       LV septal e' Vmax       0.08 m/sec      LV lateral e' Vmax      0.05 m/sec      LV E:e' septal ratio    5.2 ratio       LV E:e' lateral ratio   8.3 ratio         Aortic Valve  Name                    Value           AV Vmax                 1.25 m/sec      AV peak gradient        6 mmHg          AR PHT                  768 msec        AR peak gradient        73 mmHg           Pulmonic Valve/Qp:Qs  Name                    Value           PV Vmax                 0.85 m/sec      PV peak gradient        3 mmHg          PV acceleration time    35 msec           Electronically signed by: Cory Culver MD on 02/09/2015 17:21:27         Discharge Details        Discharge Medications      New Medications      Instructions Start Date   carvedilol 12.5 MG  tablet  Commonly known as:  COREG   12.5 mg, Oral, Every 12 Hours Scheduled      cefdinir 300 MG capsule  Commonly known as:  OMNICEF   300 mg, Oral, 2 Times Daily      NIFEdipine CC 60 MG 24 hr tablet  Commonly known as:  ADALAT CC   60 mg, Oral, Daily         Continue These Medications      Instructions Start Date   acetaminophen 325 MG tablet  Commonly known as:  TYLENOL   650 mg, Oral, 2 Times Daily      alendronate 70 MG tablet  Commonly known as:  FOSAMAX   70 mg, Oral, Every 7 Days, Wednesday      aspirin 81 MG EC tablet   81 mg, Oral, Daily      BION TEARS OP   1 drop, Ophthalmic, 2 Times Daily      calcium carbonate-cholecalciferol 500-400 MG-UNIT tablet tablet   2 tablets, Oral, Daily      docusate sodium 100 MG capsule  Commonly known as:  COLACE   100 mg, Oral, 2 Times Daily      gabapentin 100 MG capsule  Commonly known as:  NEURONTIN   100 mg, Oral, 2 Times Daily      loratadine 10 MG tablet  Commonly known as:  CLARITIN   10 mg, Oral, Daily      nystatin 386136 UNIT/GM powder  Commonly known as:  MYCOSTATIN   1 application, Topical, 2 Times Daily      promethazine 25 MG suppository  Commonly known as:  PHENERGAN   25 mg, Rectal, Every 6 Hours PRN      raNITIdine 150 MG tablet  Commonly known as:  ZANTAC   150 mg, Oral, Daily      rOPINIRole 0.5 MG tablet  Commonly known as:  REQUIP   0.5 mg, Oral, Nightly, Take 1 hour before bedtime.       venlafaxine 37.5 MG tablet  Commonly known as:  EFFEXOR   37.5 mg, Oral, 2 Times Daily      ZOFRAN 4 MG tablet  Generic drug:  ondansetron   4 mg, Oral, Every 6 Hours PRN         Stop These Medications    amLODIPine 5 MG tablet  Commonly known as:  NORVASC     hydrochlorothiazide 12.5 MG tablet  Commonly known as:  HYDRODIURIL     potassium chloride ER 20 MEQ tablet controlled-release ER tablet  Commonly known as:  K-TAB            Allergies   Allergen Reactions   • Ace Inhibitors Other (See Comments)     UNKNOWN   • Amoxicillin Other (See Comments)     UNKNOWN   •  Penicillins Other (See Comments)     UNKNOWN         Discharge Disposition:  Skilled Nursing Facility (DC - External)    Discharge Diet:  Diet Order   Procedures   • Diet Dysphagia; II - Pureed; Thin; Other; small bite/sips         Discharge Activity:         CODE STATUS:    Code Status and Medical Interventions:   Ordered at: 04/05/19 1104     Limited Support to NOT Include:    Intubation    Cardioversion/Defibrillation     Level Of Support Discussed With:    Health Care Surrogate     Code Status:    No CPR     Medical Interventions (Level of Support Prior to Arrest):    Limited         No future appointments.        Time Spent on Discharge:  35 minutes    Electronically signed by Rodrigo Cerna MD, 04/11/19, 1:46 PM.

## 2019-04-11 NOTE — PROGRESS NOTES
[facility] Progress Note        Antibiotics:  Anti-Infectives (From admission, onward)    Ordered     Dose/Rate Route Frequency Start Stop    04/06/19 0817  cefepime (MAXIPIME) 1 g/100 mL 0.9% NS IVPB (mbp)     Ordering Provider:  Vishal Garay MD    1 g  over 4 Hours Intravenous Every 12 Hours 04/06/19 1200 04/15/19 1159    04/05/19 1248  cefepime (MAXIPIME) 2 g/100 mL 0.9% NS (mbp)     Ordering Provider:  Porsha Major., DO    2 g  200 mL/hr over 30 Minutes Intravenous Once 04/05/19 1430 04/05/19 1641    04/05/19 1128  vancomycin (VANCOCIN) in iso-osmotic dextrose IVPB 1 g (premix) 200 mL     Ordering Provider:  Silvano Britt, RPH    1,000 mg  over 60 Minutes Intravenous Once 04/05/19 1130 04/05/19 1245    04/05/19 0858  cefTRIAXone (ROCEPHIN) IVPB 2 g     Ordering Provider:  Tee Simons MD    2 g  100 mL/hr over 30 Minutes Intravenous Once 04/05/19 0900 04/05/19 1127          CC:    HPI:    Patient is a 89 y.o.  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  Evaluated in the emergency room April 5, 2019 with acute deterioration in mental status, fever exceeding 102, noted to have acute kidney injury and tachycardia, and diagnosed with acute sepsis, UTI and given broad-spectrum antimicrobials.  Urine culture subsequently with gram-negative rods and empiric cefepime ongoing.  Kidney function improved with resuscitation and fever/leukocytosis improved with empiric antibiotics.  Urinalysis with pyuria/hematuria.     4/11/19 seen early and sleepy; Patient not cooperative with review of systems or history.  Nursing reports no respiratory distress or cough and generally stable.  No nausea/vomiting or diarrhea.  Urinary incontinence but no gross hematuria/pyuria per their report.  Patient debilitated with dementia and no other specific focal pain that they will relate.  No adverse drug reaction per nursing.  No  "rash         ROS:      4/11/19 No f/c/s. No n/v/d. No rash. No new ADR to Abx.     PE:   /69 (BP Location: Left arm, Patient Position: Lying)   Pulse 67   Temp 98.4 °F (36.9 °C) (Tympanic)   Resp 16   Ht 165.1 cm (65\")   Wt 59.4 kg (131 lb)   SpO2 98%   BMI 21.80 kg/m²     GENERAL: sleepy ; in no acute distress.   HEENT: Normocephalic, atraumatic. No conjunctival injection. No icterus. Oropharynx clear without evidence of thrush or exudate. No evidence of peridontal disease.    NECK: Supple without nuchal rigidity. No mass.  LYMPH: No cervical, axillary or inguinal lymphadenopathy.  HEART: RRR; No murmur, rubs, gallops.   LUNGS: Diminished at bases bilateral bilaterally without wheezing, rales, rhonchi. Normal respiratory effort. Nonlabored. No dullness.  ABDOMEN: Soft, slight grimace with suprapubic pressure, nondistended. Positive bowel sounds. No rebound or guarding. NO mass or HSM.  EXT:  No cyanosis, clubbing or edema. No cord.  : Genitalia generally unremarkable.  Without Davis catheter.  MSK: FROM without joint effusions noted arms/legs.    SKIN: Warm and dry without cutaneous eruptions on Inspection/palpation.    NEURO: She does not interact in any meaningful way with motor/sensory exam     No peripheral stigmata/phenomena of endocarditis          Laboratory Data    Results from last 7 days   Lab Units 04/08/19  0331 04/07/19  0343 04/06/19  0352   WBC 10*3/mm3 7.85 12.17* 13.77*   HEMOGLOBIN g/dL 12.3 11.8 13.0   HEMATOCRIT % 36.9 36.9 42.1   PLATELETS 10*3/mm3 214 218 284     Results from last 7 days   Lab Units 04/11/19  0539   SODIUM mmol/L 140   POTASSIUM mmol/L 3.5   CHLORIDE mmol/L 110*   CO2 mmol/L 20.0*   BUN mg/dL 17   CREATININE mg/dL 0.59   GLUCOSE mg/dL 106*   CALCIUM mg/dL 8.7     Results from last 7 days   Lab Units 04/10/19  0630   ALK PHOS U/L 79   BILIRUBIN mg/dL 0.3   ALT (SGPT) U/L 37*   AST (SGOT) U/L 34*               Estimated Creatinine Clearance: 42.1 mL/min (by C-G " formula based on SCr of 0.59 mg/dL).      Microbiology:      Radiology:  Imaging Results (last 72 hours)     Procedure Component Value Units Date/Time    FL Video Swallow With Speech [[id number]] Collected:  04/08/19 1441     Updated:  04/08/19 1657    Narrative:       EXAMINATION: FL VIDEO SWALLOW W SPEECH-     INDICATION: DYSPHAGIA, OROPHARYNGEAL, HAS ATTRIBUTABLE CAUSE     TECHNIQUE: 1 minute and 12 seconds of fluoroscopic time was used for  this exam. 1 associated image was saved. The patient was evaluated in  the seated lateral position while taking a variety of consistencies of  barium by mouth under the direction of speech pathology.     COMPARISON: NONE     FINDINGS: There was penetration that cleared without aspiration with  sips of thin barium. There was no penetration and no aspiration with  nectar, or pudding consistency barium.          Impression:       Fluoroscopy provided for a modified barium swallow. Please  see speech therapy report for full details and recommendations.         This report was finalized on 4/8/2019 4:53 PM by Dr. Derek Contreras MD.       US Renal Bilateral [[id number]] Collected:  04/08/19 1601     Updated:  04/08/19 1609    Narrative:       EXAMINATION: US RENAL, BILATERAL-04/08/2019:     INDICATION: TAMI; UTI and sepsis;  r/o obstruction; A41.9-Sepsis,  unspecified organism; N39.0-Urinary tract infection, site not specified;  N28.9-Disorder of kidney and ureter, unspecified; E86.0-Dehydration;  E87.3-Alkalosis; R74.0-Nonspecific elevation of levels of transaminase  and lactic acid dehydrogenase (ldh); R41.82-Altered mental status,  unspecified; Z74.09-Other reduced mobility; Z74.09-Other reduced.       TECHNIQUE: Ultrasound of the kidneys and urinary bladder.     COMPARISON: NONE.     FINDINGS:  The right kidney measures 9.3 cm in length without evidence  of hydronephrosis, contour deforming mass or obvious calculi.     The left kidney measures 8.5 cm in length without evidence  of  hydronephrosis, contour deforming mass or obvious calculi.     The urinary bladder is moderately distended with layering echogenic  material without significant internal flow on Doppler interrogation  likely representing debris.     Incidental note of echogenic foci with shadowing in the gallbladder  indicating cholelithiasis.       Impression:       1.  No hydronephrosis.  2.  Layering echogenic material of likely debris within the urinary  bladder. This does not have internal flow on Doppler interrogation.  3.  Incidental note of cholelithiasis.     D:  04/08/2019  E:  04/08/2019            This report was finalized on 4/8/2019 4:06 PM by Dr. Leo Lombardi.       XR Chest 1 View [[id number]] Collected:  04/06/19 1351     Updated:  04/06/19 2258    Narrative:          EXAMINATION: XR CHEST 1 VW - 04/06/2019     INDICATION:  A41.9-Sepsis, unspecified organism; N39.0-Urinary tract  infection, site not specified; N28.9-Disorder of kidney and ureter,  unspecified; E86.0-Dehydration; E87.3-Alkalosis; R74.0-Nonspecific  elevation of levels of transaminase and lactic acid dehydrogenase (ldh);  R41.82-Altered mental status, unspecified.      COMPARISON: 04/05/2019     FINDINGS: Patient again appears rotated to the right, apparently due to  marked levoconvex scoliosis. Heart shadow is mildly enlarged. The  vasculature appears normal. Lungs are moderately well-expanded and  appear clear. Old left-sided rib fractures and advanced shoulder joint  DJD are again noted.           Impression:       Stable chest exam with cardiomegaly but no evidence of  congestive failure. No new chest disease is identified.     DICTATED:   04/06/2019  EDITED/ls :   04/06/2019      This report was finalized on 4/6/2019 10:55 PM by DR. Earl Valadez MD.               Impression:     --Acute sepsis, urinary source and improving sepsis parameters with resuscitation/antibiotics.  No other particular focus by exam/symptoms at present but will  monitor     --Acute complicated UTI.  E. Coli/proteus in culture.  High risk for further relapse in the future.  Family will need to help determine goals of care.  With recurrent UTIs, consideration could be given to urology input as outpatient for further functional/anatomic assessment to help minimize risk for relapse.  However her age and comorbidity may limit utility and family may wish to focus more on conservative care/palliation rather than further workup.  However ultimately that decision will be up to them.     --Acute kidney injury.  Likely prerenal associated with sepsis and ATN.  However, check renal ultrasound to exclude any obvious obstruction      --Chronic dementia with acute encephalopathy out of proportion to baseline, likely associated with sepsis at least.  Unclear to me if hypertension or other issues related.  Further workup or decision regarding subspecialty evaluation per medicine at their discretion     --Hypertensive urgency per records.  Treatment per medicine     --Abnormal LFTs.  Monitor.  Abdomen otherwise benign     --History VRE       PLAN:      --IV cefepime for now     --Check/review labs cultures and scans     --Discussed with microbiology     --Partial history per nursing staff     --Renal ultrasound pending     --Highly complex set of issues with high risk for further serious morbidity and other serious sequela    --Oral Omnicef likely to help transition to oral agent and finish therapy for UTI, 7-10 days total from initiation of cefepime. D/w Dr Rankin;  I will sign off at discharge; we are available prn; call us if needed       Mario Brice MD  4/11/2019

## 2020-07-19 NOTE — THERAPY TREATMENT NOTE
Acute Care - Speech Language Pathology   Swallow Treatment Note [facility]     Patient Name: Roro Guzman  : 1926  MRN: [id number]  Today's Date: 2019  Onset of Illness/Injury or Date of Surgery: 19     Referring Physician: YODIT Garay MD      Admit Date: 2019    Visit Dx:      ICD-10-CM ICD-9-CM   1. Sepsis due to urinary tract infection (CMS/HCC) A41.9 038.9    N39.0 995.91     599.0   2. Renal insufficiency N28.9 593.9   3. Dehydration E86.0 276.51   4. Respiratory alkalosis E87.3 276.3   5. Elevated serum lactate dehydrogenase R74.0 790.4   6. Altered mental status, unspecified altered mental status type R41.82 780.97   7. Impaired mobility and ADLs Z74.09 799.89   8. Impaired functional mobility, balance, gait, and endurance Z74.09 V49.89   9. Oropharyngeal dysphagia R13.12 787.22     Patient Active Problem List   Diagnosis   • Sepsis due to urinary tract infection (CMS/HCC)   • Hypertension   • Diabetes mellitus (CMS/HCC)   • Depression   • Dementia   • TAMI (acute kidney injury) (CMS/[facility])   • Lactic acidosis   • Transaminitis       Therapy Treatment  Rehabilitation Treatment Summary     Row Name 19 1200 19 1150 19 1025       Treatment Time/Intention    Discipline  speech language pathologist  -RD  physical therapist  -KM  occupational therapist  -CL    Document Type  therapy note (daily note)  -RD  therapy note (daily note)  -KM  therapy note (daily note)  -CL    Subjective Information  no complaints  -RD  no complaints  -KM  complains of;fatigue  -CL    Mode of Treatment  speech-language pathology;individual therapy  -RD  physical therapy  -KM  --    Patient/Family Observations  No family present  -RD  no family present  -KM  --    Care Plan Review  evaluation/treatment results reviewed;care plan/treatment goals reviewed;risks/benefits reviewed;current/potential barriers reviewed;patient/other agree to care plan  -RD  care plan/treatment goals reviewed;risks/benefits  reviewed;patient/other agree to care plan  -KM  --    Therapy Frequency (PT Clinical Impression)  --  daily  -KM  --    Patient Effort  good  -RD  good  -KM  good  -CL    Comment  Baseline dementia.   -RD  --  --    Existing Precautions/Restrictions  --  fall  -KM  fall  -CL    Treatment Considerations/Comments  --  --  Pt declined any OOB activity.   -CL    Recorded by [RD] Shae Barnhart MS CCC-SLP 04/09/19 1345 [KM] Taylor Grijalva, PT 04/09/19 1208 [CL] Zuleyka Julian, OT 04/09/19 1147    Row Name 04/09/19 1025             Vital Signs    Pre Systolic BP Rehab  -- no tele, RN cleared for tx.   -CL      Recorded by [CL] Zuleyka Julian, OT 04/09/19 1147      Row Name 04/09/19 1150 04/09/19 1025          Cognitive Assessment/Intervention- PT/OT    Affect/Mental Status (Cognitive)  confused  -KM  confused  -CL     Orientation Status (Cognition)  oriented to;person  -KM  oriented to;person  -CL     Follows Commands (Cognition)  follows one step commands;75-90% accuracy;physical/tactile prompts required;repetition of directions required  -KM  follows one step commands;75-90% accuracy;repetition of directions required;verbal cues/prompting required [place]  -CL     Cognitive Function (Cognitive)  --  safety deficit  -CL     Safety Deficit (Cognitive)  --  mild deficit;awareness of need for assistance;safety precautions awareness  -CL     Personal Safety Interventions  fall prevention program maintained  -KM  fall prevention program maintained;muscle strengthening facilitated;nonskid shoes/slippers when out of bed;supervised activity  -CL     Recorded by [KM] Taylor Grijalva, PT 04/09/19 1208 [CL] Zuleyka Julian, OT 04/09/19 1147     Row Name 04/09/19 1025             Bed Mobility Assessment/Treatment    Bed Mobility Assessment/Treatment  supine-sit;sit-supine  -CL      Supine-Sit [place] (Bed Mobility)  maximum assist (25% patient effort);2 person assist;verbal cues  -CL      Sit-Supine [place] (Bed  Mobility)  dependent (less than 25% patient effort);2 person assist;verbal cues  -CL      Assistive Device (Bed Mobility)  bed rails;draw sheet;head of bed elevated  -CL      Recorded by [CL] Zuleyka Julian, OT 04/09/19 1147      Row Name 04/09/19 1025             Transfer Assessment/Treatment    Comment (Transfers)  Pt declined.   -CL      Recorded by [CL] Zuleyka Julian, OT 04/09/19 1147      Row Name 04/09/19 1025             ADL Assessment/Intervention    88985 - OT Self Care/Mgmt Minutes  4  -CL      BADL Assessment/Intervention  grooming  -CL      Recorded by [CL] Zuleyka Julian, OT 04/09/19 1147      Row Name 04/09/19 1025             Grooming Assessment/Training    [place] Level (Grooming)  wash face, hands;supervision;verbal cues;hair care, combing/brushing;maximum assist (25% patient effort)  -CL      Grooming Position  edge of bed sitting  -CL      Recorded by [CL] Zuleyka Julian, OT 04/09/19 1147      Row Name 04/09/19 1150 04/09/19 1025          Therapeutic Exercise    Therapeutic Exercise  --  seated, upper extremities;seated, lower extremities  -CL     90687 - PT Therapeutic Exercise Minutes  10  -KM  --     87030 - OT Therapeutic Exercise Minutes  --  6  -CL     05908 - OT Therapeutic Activity Minutes  --  15  -CL     Recorded by [KM] Taylor Grijalva, PT 04/09/19 1208 [CL] Zuleyka Julian, OT 04/09/19 1147     Row Name 04/09/19 1150 04/09/19 1025          Upper Extremity Seated Therapeutic Exercise    Performed, Seated Upper Extremity (Therapeutic Exercise)  shoulder flexion/extension;shoulder abduction/adduction;elbow flexion/extension;forearm supination/pronation  -KM  shoulder flexion/extension;elbow flexion/extension;digit flexion/extension  -CL     Exercise Type, Seated Upper Extremity (Therapeutic Exercise)  AAROM (active assistive range of motion)  -KM  AAROM (active assistive range of motion);AROM (active range of motion)  -CL     Sets/Reps Detail, Seated Upper Extremity (Therapeutic  Exercise)  1/10  -KM  1/8  -CL     Comment, Seated Upper Extremity (Therapeutic Exercise)  --  BUE  -CL     Recorded by [KM] Taylor Grijalva, PT 04/09/19 1208 [CL] Zuleyka Julian, OT 04/09/19 1147     Row Name 04/09/19 1025             Lower Extremity Seated Therapeutic Exercise    Performed, Seated Lower Extremity (Therapeutic Exercise)  hip flexion/extension;LAQ (long arc quad), knee extension;ankle dorsiflexion/plantarflexion  -CL      Exercise Type, Seated Lower Extremity (Therapeutic Exercise)  AROM (active range of motion);AAROM (active assistive range of motion)  -CL      Sets/Reps Detail, Seated Lower Extremity (Therapeutic Exercise)  1/8  -CL      Comment, Seated Lower Extremity (Therapeutic Exercise)  BLE  -CL      Recorded by [CL] Zuleyka Julian, OT 04/09/19 1147      Row Name 04/09/19 1150             Lower Extremity Supine Therapeutic Exercise    Performed, Supine Lower Extremity (Therapeutic Exercise)  hip abduction/adduction;hip external/internal rotation;SAQ (short arc quad) over bolster;ankle dorsiflexion/plantarflexion;heel slides  -KM      Recorded by [KM] Taylor Grijalva, PT 04/09/19 1208      Row Name 04/09/19 1150             Therapeutic Exercise    Lower Extremity (Therapeutic Exercise)  gastroc stretch, bilateral;hamstring stretch, bilateral  -KM      Lower Extremity Range of Motion (Therapeutic Exercise)  ankle dorsiflexion/plantar flexion, bilateral  -KM      Exercise Type (Therapeutic Exercise)  AAROM (active assistive range of motion)  -KM      Position (Therapeutic Exercise)  supine  -KM      Sets/Reps (Therapeutic Exercise)  1/10  -KM      Recorded by [KM] Tyalor Grijalva, PT 04/09/19 1208      Row Name 04/09/19 1025             Balance    Balance  static sitting balance  -CL      Recorded by [CL] Zuleyka Julian, OT 04/09/19 1147      Row Name 04/09/19 1025             Static Sitting Balance    Level of [place] (Unsupported Sitting, Static Balance)  contact guard  assist  -CL      Sitting Position (Unsupported Sitting, Static Balance)  sitting on edge of bed  -CL      Time Able to Maintain Position (Unsupported Sitting, Static Balance)  more than 5 minutes  -CL      Recorded by [CL] Zuleyka Julian, OT 04/09/19 1147      Row Name 04/09/19 1025             Positioning and Restraints    Pre-Treatment Position  in bed  -CL      Post Treatment Position  bed  -CL      In Bed  notified nsg;fowlers;call light within reach;encouraged to call for assist;exit alarm on;side rails up x3;legs elevated;heels elevated  -CL      Recorded by [CL] Zuleyka Julian, OT 04/09/19 1147      Row Name 04/09/19 1200 04/09/19 1150 04/09/19 1025       Pain Scale: Numbers Pre/Post-Treatment    Pain Scale: Numbers, Pretreatment  0/10 - no pain  -RD  0/10 - no pain  -KM  0/10 - no pain  -CL    Pain Scale: Numbers, Post-Treatment  0/10 - no pain  -RD  0/10 - no pain  -KM  0/10 - no pain  -CL    Recorded by [RD] Shae Barnhart MS CCC-SLP 04/09/19 1345 [KM] Taylor Grijalva, PT 04/09/19 1208 [CL] Zuleyka Julian, OT 04/09/19 1147    Row Name 04/09/19 1150             Plan of Care Review    Plan of Care Reviewed With  patient  -KM      Recorded by [KM] Taylor Grijalva, PT 04/09/19 1208      Row Name 04/09/19 1200             Outcome Summary/Treatment Plan (SLP)    Daily Summary of Progress (SLP)  progress toward functional goals as expected;prepare for discharge  -RD      Barriers to Overall Progress (SLP)  baseline dementia  -RD      Plan for Continued Treatment (SLP)  Saw for diet tolerance and education on general swallowing precautions and compensations. Pt alert and cooperative. Baseline dementia, but able to take small bites/sips. Trials of thins and pureed given. No overt s/s of aspiration w/ small bites/sips. Reviewed general precautions w/ pt. No family present. RN reports no concerns w/ pt's diet. No further SLP dysphagia needs at this time.   -RD      Anticipated Dischage Disposition   skilled nursing facility;anticipate therapy at next level of care  -RD      Recorded by [RD] Shae Barnhart, MS CCC-SLP 04/09/19 1345        User Key  (r) = Recorded By, (t) = Taken By, (c) = Cosigned By    Initials Name Effective Dates Discipline    Taylor Esquivel ARIELLA, PT 06/19/15 -  PT    CL Zuleyka Julian, OT 04/03/18 -  OT    RD Shae Barnhart, MS CCC-SLP 04/03/18 -  SLP          Outcome Summary  Outcome Summary/Treatment Plan (SLP)  Daily Summary of Progress (SLP): progress toward functional goals as expected, prepare for discharge (04/09/19 1200 : Shae Barnhart, MS CCC-SLP)  Barriers to Overall Progress (SLP): baseline dementia (04/09/19 1200 : Shae Barnhart, MS CCC-SLP)  Plan for Continued Treatment (SLP): Saw for diet tolerance and education on general swallowing precautions and compensations. Pt alert and cooperative. Baseline dementia, but able to take small bites/sips. Trials of thins and pureed given. No overt s/s of aspiration w/ small bites/sips. Reviewed general precautions w/ pt. No family present. RN reports no concerns w/ pt's diet. No further SLP dysphagia needs at this time.  (04/09/19 1200 : Shae Barnhart, MS CCC-SLP)  Anticipated Dischage Disposition: skilled nursing facility, anticipate therapy at next level of care (04/09/19 1200 : Shae Barnhart, MS CCC-SLP)      SLP GOALS     Row Name 04/09/19 1200 04/08/19 1045          Oral Nutrition/Hydration Goal 1 (SLP)    Oral Nutrition/Hydration Goal 1, SLP  LTG: Pt will tolerate thins and level 2-pureed w/ 100% accuracy w/ min cues.   -RD  LTG: Pt will tolerate thins and level 2-pureed w/ 100% accuracy w/ min cues.   -RD     Time Frame (Oral Nutrition/Hydration Goal 1, SLP)  by discharge  -RD  by discharge  -RD     Barriers (Oral Nutrition/Hydration Goal 1, SLP)  no reported concerns w/ current diet  -RD  --     Progress/Outcomes (Oral Nutrition/Hydration Goal 1, SLP)  goal met  -RD  --        Oral Nutrition/Hydration  Goal 2 (SLP)    Oral Nutrition/Hydration Goal 2, SLP  Pt will demonstrate no overt s/s of aspiration w/ thins and pureed w/ 100% accuracy w/o cues  -RD  Pt will demonstrate no overt s/s of aspiration w/ thins and pureed w/ 100% accuracy w/o cues  -RD     Time Frame (Oral Nutrition/Hydration Goal 2, SLP)  short term goal (STG);by discharge  -RD  short term goal (STG);by discharge  -RD     Barriers (Oral Nutrition/Hydration Goal 2, SLP)  no s/s w/ thins or pureed. tolerating diet  -RD  --     Progress/Outcomes (Oral Nutrition/Hydration Goal 2, SLP)  goal met  -RD  --        Swallow Management Recall Goal 1 (SLP)    Activity (Swallow Management Recall Goal 1, SLP)  safe diet/liquid level;safe diet level/texture;compensatory swallow strategies/techniques;postural techniques;other (see comments)  -RD  safe diet/liquid level;safe diet level/texture;compensatory swallow strategies/techniques;postural techniques;other (see comments) family edu  -RD     [place]/Accuracy (Swallow Management Recall Goal 1, SLP)  with minimal cues (75-90% accuracy)  -RD  with minimal cues (75-90% accuracy)  -RD     Time Frame (Swallow Management Recall Goal 1, SLP)  short term goal (STG);by discharge  -RD  short term goal (STG);by discharge  -RD     Barriers (Swallow Management Recall Goal 1, SLP)  educated on strategy. Pt able to complete w/ min cues.   -RD  --     Progress/Outcomes (Swallow Management Recall Goal 1, SLP)  goal met  -RD  --        Swallow Compensatory Strategies Goal 1 (SLP)    Activity (Swallow Compensatory Strategies/Techniques Goal 1, SLP)  compensatory strategies;postural techniques;small bites;small cup sips;small straw sips;alternate food/liquid intake  -RD  compensatory strategies;postural techniques;small bites;small cup sips;small straw sips;alternate food/liquid intake  -RD     [place]/Accuracy (Swallow Compensatory Strategies/Techniques Goal 1, SLP)  with moderate cues (50-74% accuracy)  -RD  with moderate  cues (50-74% accuracy)  -RD     Time Frame (Swallow Compensatory Strategies/Techniques Goal 1, SLP)  short term goal (STG);by discharge  -RD  short term goal (STG);by discharge  -RD     Progress/Outcomes (Swallow Compensatory Strategies/Techniques Goal 1, SLP)  goal met  -RD  --       User Key  (r) = Recorded By, (t) = Taken By, (c) = Cosigned By    Initials Name Provider Type    Shae Hernandez MS CCC-SLP Speech and Language Pathologist          EDUCATION  The patient has been educated in the following areas:   Dysphagia (Swallowing Impairment) Oral Care/Hydration Modified Diet Instruction.    SLP Recommendation and Plan  Daily Summary of Progress (SLP): progress toward functional goals as expected, prepare for discharge  Barriers to Overall Progress (SLP): baseline dementia  Plan for Continued Treatment (SLP): Saw for diet tolerance and education on general swallowing precautions and compensations. Pt alert and cooperative. Baseline dementia, but able to take small bites/sips. Trials of thins and pureed given. No overt s/s of aspiration w/ small bites/sips. Reviewed general precautions w/ pt. No family present. RN reports no concerns w/ pt's diet. No further SLP dysphagia needs at this time.   Anticipated Dischage Disposition: skilled nursing facility, anticipate therapy at next level of care                    Time Calculation:   Time Calculation- SLP     Row Name 04/09/19 1405             Time Calculation- SLP    SLP Start Time  1200  -RD      SLP Received On  04/09/19  -        User Key  (r) = Recorded By, (t) = Taken By, (c) = Cosigned By    Initials Name Provider Type    Shae Hernandez MS CCC-SLP Speech and Language Pathologist          Therapy Charges for Today     Code Description Service Date Service Provider Modifiers Qty    64123040310 HC ST MOTION FLUORO EVAL SWALLOW 5 4/8/2019 Shae Barnhart MS CCC-SLP GN 1    [id number] HC ST TREATMENT SWALLOW 3 4/9/2019 Shae Barnhart MS  CCC-SLP GN 1                 Shae Barnhart, MS TALON-SLP  4/9/2019   right PCA CVA